# Patient Record
Sex: FEMALE | Race: WHITE | NOT HISPANIC OR LATINO | Employment: OTHER | URBAN - METROPOLITAN AREA
[De-identification: names, ages, dates, MRNs, and addresses within clinical notes are randomized per-mention and may not be internally consistent; named-entity substitution may affect disease eponyms.]

---

## 2019-04-02 PROBLEM — I10 ESSENTIAL HYPERTENSION: Status: ACTIVE | Noted: 2019-04-02

## 2019-04-02 PROBLEM — E03.9 HYPOTHYROID: Status: ACTIVE | Noted: 2019-04-02

## 2019-04-02 PROBLEM — M25.50 MULTIPLE JOINT PAIN: Status: ACTIVE | Noted: 2019-04-02

## 2019-04-02 PROBLEM — K64.9 HEMORRHOIDS: Status: ACTIVE | Noted: 2019-04-02

## 2019-04-02 PROBLEM — R06.83 SNORING: Status: ACTIVE | Noted: 2019-04-02

## 2019-04-02 PROBLEM — R53.82 CHRONIC FATIGUE: Status: ACTIVE | Noted: 2019-04-02

## 2019-04-23 ENCOUNTER — APPOINTMENT (OUTPATIENT)
Dept: LAB | Facility: HOSPITAL | Age: 59
End: 2019-04-23

## 2021-04-13 ENCOUNTER — OFFICE VISIT (OUTPATIENT)
Dept: FAMILY MEDICINE CLINIC | Facility: CLINIC | Age: 61
End: 2021-04-13

## 2021-04-13 VITALS
TEMPERATURE: 97.8 F | HEART RATE: 75 BPM | BODY MASS INDEX: 38.92 KG/M2 | RESPIRATION RATE: 18 BRPM | WEIGHT: 256.8 LBS | HEIGHT: 68 IN | OXYGEN SATURATION: 97 % | DIASTOLIC BLOOD PRESSURE: 88 MMHG | SYSTOLIC BLOOD PRESSURE: 132 MMHG

## 2021-04-13 DIAGNOSIS — Z00.01 ENCOUNTER FOR GENERAL ADULT MEDICAL EXAMINATION WITH ABNORMAL FINDINGS: ICD-10-CM

## 2021-04-13 DIAGNOSIS — Z12.11 SCREENING FOR COLON CANCER: ICD-10-CM

## 2021-04-13 DIAGNOSIS — E03.9 ACQUIRED HYPOTHYROIDISM: Primary | ICD-10-CM

## 2021-04-13 DIAGNOSIS — I10 ESSENTIAL HYPERTENSION: ICD-10-CM

## 2021-04-13 PROBLEM — Z12.31 SCREENING MAMMOGRAM, ENCOUNTER FOR: Status: ACTIVE | Noted: 2021-04-13

## 2021-04-13 PROBLEM — Z13.220 SCREENING FOR HYPERLIPIDEMIA: Status: ACTIVE | Noted: 2021-04-13

## 2021-04-13 PROBLEM — Z12.4 SCREENING FOR CERVICAL CANCER: Status: ACTIVE | Noted: 2021-04-13

## 2021-04-13 PROCEDURE — 99386 PREV VISIT NEW AGE 40-64: CPT | Performed by: FAMILY MEDICINE

## 2021-04-13 RX ORDER — SPIRONOLACTONE 50 MG/1
100 TABLET, FILM COATED ORAL 2 TIMES DAILY
COMMUNITY
Start: 2021-01-13 | End: 2023-03-10

## 2021-04-13 RX ORDER — LEVOTHYROXINE SODIUM 0.07 MG/1
75 TABLET ORAL DAILY
COMMUNITY
End: 2021-05-28 | Stop reason: SDUPTHER

## 2021-04-13 RX ORDER — METOPROLOL SUCCINATE 50 MG/1
50 TABLET, EXTENDED RELEASE ORAL DAILY
COMMUNITY
End: 2021-05-28 | Stop reason: SDUPTHER

## 2021-04-13 RX ORDER — LEVOTHYROXINE SODIUM 0.07 MG/1
TABLET ORAL
COMMUNITY
Start: 2021-03-01 | End: 2021-04-13

## 2021-04-13 NOTE — PROGRESS NOTES
"Subjective   Tirso Palomo is a 60 y.o. female.     Chief Complaint   Patient presents with   • Hypothyroidism   • Annual Exam     Annual Wellness  Tirso is a 60 female here for a Well Woman Visit. Energy level is described as fair and SHe is sleeping fairly well. She sleeps 7 hours nightly. Last pap was 2019 at [facility] . Patient exercises irregularly. Nutrition is described as in general, a \"healthy\" diet  . Her libido is normal. She reports that she does perform monthly self breast exam.    Hypothyroidism  This is a chronic problem. The current episode started more than 1 year ago. The problem occurs constantly. The problem has been waxing and waning. Pertinent negatives include no abdominal pain, arthralgias, change in bowel habit, chest pain, chills, congestion, coughing, fatigue, fever, headaches, nausea, rash, sore throat, visual change or vomiting. Nothing aggravates the symptoms. Treatments tried: levothyroxine.        I personally reviewed and updated the patient's allergies, medications, problem list, and past medical, surgical, social, and family history. I have reviewed and confirmed the accuracy of the History of Present Illness and Review of Symptoms as documented by the MA/LPN/RN. Miryam Martinez MD    Allergies:  Allergies   Allergen Reactions   • Lisinopril Cough   • Topamax [Topiramate] GI Intolerance   • Amlodipine Palpitations       Social History:  Social History     Socioeconomic History   • Marital status:      Spouse name: Not on file   • Number of children: Not on file   • Years of education: Not on file   • Highest education level: Not on file   Tobacco Use   • Smoking status: Former Smoker   • Smokeless tobacco: Never Used   Substance and Sexual Activity   • Alcohol use: No   • Drug use: No       Family History:  Family History   Problem Relation Age of Onset   • Cancer Mother         breast   • Hypertension Father    • Heart attack Father    • Cancer Father         prostate   • " Obesity Brother    • Hypertension Brother    • Heart disease Maternal Grandmother    • Diabetes Maternal Grandmother    • Obesity Maternal Grandfather    • Stroke Maternal Grandfather    • Cancer Paternal Grandmother    • Stroke Paternal Grandfather        Past Medical History :  Patient Active Problem List   Diagnosis   • Chronic fatigue   • Essential hypertension   • Snoring   • Hemorrhoids   • Multiple joint pain   • Acquired hypothyroidism   • Screening mammogram, encounter for   • Screening for cervical cancer   • Screening for colon cancer   • Screening for hyperlipidemia   • Encounter for general adult medical examination with abnormal findings       Medication List:    Current Outpatient Medications:   •  cholecalciferol (VITAMIN D3) 1000 units tablet, Take 1,000 Units by mouth Daily., Disp: , Rfl:   •  estradiol (ESTRACE) 0.5 MG tablet, Take 0.5 mg by mouth Daily. Vaginal as needed, Disp: , Rfl:   •  levothyroxine (SYNTHROID, LEVOTHROID) 75 MCG tablet, Take 75 mcg by mouth Daily., Disp: , Rfl:   •  metoprolol succinate XL (TOPROL-XL) 50 MG 24 hr tablet, Take 50 mg by mouth Daily., Disp: , Rfl:   •  Minoxidil 5 % foam, Apply  topically., Disp: , Rfl:   •  Probiotic Product (PROBIOTIC-10 PO), Take  by mouth., Disp: , Rfl:   •  Progesterone Micronized (PROGESTERONE PO), Take 75 mg by mouth., Disp: , Rfl:   •  spironolactone (ALDACTONE) 50 MG tablet, Take 50 mg by mouth 2 (Two) Times a Day., Disp: , Rfl:   •  Tretinoin (RETIN-A EX), Apply  topically., Disp: , Rfl:   •  vitamin B-12 (CYANOCOBALAMIN) 1000 MCG tablet, Take 1,000 mcg by mouth Daily., Disp: , Rfl:     Past Surgical History:  Past Surgical History:   Procedure Laterality Date   • BREAST SURGERY Right 2012    biopsy   • COLONOSCOPY  2018   • TUBAL ABDOMINAL LIGATION  1993       Review of Systems:  Review of Systems   Constitutional: Negative for activity change, chills, fatigue and fever.   HENT: Negative for congestion, ear pain, rhinorrhea, sinus  "pressure, sore throat and voice change.    Eyes: Negative for visual disturbance.   Respiratory: Negative for cough and shortness of breath.    Cardiovascular: Negative for chest pain.   Gastrointestinal: Negative for abdominal pain, change in bowel habit, diarrhea, nausea and vomiting.   Endocrine: Negative for cold intolerance and heat intolerance.   Genitourinary: Negative for frequency and urgency.   Musculoskeletal: Negative for arthralgias.   Skin: Negative for rash.   Neurological: Negative for syncope.   Hematological: Does not bruise/bleed easily.   Psychiatric/Behavioral: Negative for depressed mood. The patient is not nervous/anxious.        Physical Exam:  Vital Signs:  Vital Signs:   /88   Pulse 75   Temp 97.8 °F (36.6 °C)   Resp 18   Ht 171.5 cm (67.5\")   Wt 116 kg (256 lb 12.8 oz)   SpO2 97%   BMI 39.63 kg/m²     Result Review :                Physical Exam  Vitals and nursing note reviewed.   Constitutional:       General: She is not in acute distress.     Appearance: She is well-developed. She is not diaphoretic.   HENT:      Head: Normocephalic and atraumatic.      Right Ear: External ear normal.      Left Ear: External ear normal.      Nose: Nose normal.      Mouth/Throat:      Pharynx: No oropharyngeal exudate.   Eyes:      General: No scleral icterus.        Right eye: No discharge.         Left eye: No discharge.      Conjunctiva/sclera: Conjunctivae normal.      Pupils: Pupils are equal, round, and reactive to light.   Neck:      Thyroid: No thyromegaly.      Trachea: No tracheal deviation.   Cardiovascular:      Rate and Rhythm: Normal rate and regular rhythm.      Heart sounds: Normal heart sounds. No murmur heard.   No friction rub. No gallop.    Pulmonary:      Effort: Pulmonary effort is normal. No respiratory distress.      Breath sounds: Normal breath sounds. No stridor. No wheezing or rales.   Abdominal:      General: Bowel sounds are normal. There is no distension.      " Palpations: Abdomen is soft. There is no mass.      Tenderness: There is no abdominal tenderness. There is no guarding or rebound.   Musculoskeletal:         General: No tenderness or deformity. Normal range of motion.      Cervical back: Normal range of motion and neck supple.   Lymphadenopathy:      Cervical: No cervical adenopathy.   Skin:     General: Skin is warm and dry.      Capillary Refill: Capillary refill takes less than 2 seconds.      Coloration: Skin is not pale.      Findings: No erythema or rash.   Neurological:      Mental Status: She is alert and oriented to person, place, and time.      Cranial Nerves: No cranial nerve deficit.      Sensory: No sensory deficit.      Motor: No tremor, atrophy or abnormal muscle tone.      Coordination: Coordination normal.      Gait: Gait normal.      Deep Tendon Reflexes: Reflexes are normal and symmetric. Reflexes normal.   Psychiatric:         Behavior: Behavior normal.         Thought Content: Thought content normal.         Cognition and Memory: Memory is not impaired. She does not exhibit impaired recent memory or impaired remote memory.         Judgment: Judgment normal.         Assessment and Plan:  Problems Addressed this Visit        Cardiac and Vasculature    Essential hypertension    Relevant Medications    metoprolol succinate XL (TOPROL-XL) 50 MG 24 hr tablet    spironolactone (ALDACTONE) 50 MG tablet    Other Relevant Orders    CBC & Differential    Comprehensive Metabolic Panel    TSH       Endocrine and Metabolic    Acquired hypothyroidism - Primary    Relevant Medications    levothyroxine (SYNTHROID, LEVOTHROID) 75 MCG tablet    metoprolol succinate XL (TOPROL-XL) 50 MG 24 hr tablet       Gastrointestinal Abdominal     Screening for colon cancer    Relevant Orders    Lipid Panel With / Chol / HDL Ratio       Health Encounters    Encounter for general adult medical examination with abnormal findings      Diagnoses       Codes Comments    Acquired  hypothyroidism    -  Primary ICD-10-CM: E03.9  ICD-9-CM: 244.9     Encounter for general adult medical examination with abnormal findings     ICD-10-CM: Z00.01  ICD-9-CM: V70.0     Essential hypertension     ICD-10-CM: I10  ICD-9-CM: 401.9     Screening for colon cancer     ICD-10-CM: Z12.11  ICD-9-CM: V76.51            An After Visit Summary and PPPS were given to the patient.         I wore protective equipment throughout this patient encounter to include mask. Hand hygiene was performed before donning protective equipment and after removal when leaving the room.

## 2021-04-23 ENCOUNTER — LAB (OUTPATIENT)
Dept: LAB | Facility: HOSPITAL | Age: 61
End: 2021-04-23

## 2021-04-23 ENCOUNTER — HOSPITAL ENCOUNTER (OUTPATIENT)
Dept: CARDIOLOGY | Facility: HOSPITAL | Age: 61
Discharge: HOME | End: 2021-04-23

## 2021-04-23 ENCOUNTER — TRANSCRIBE ORDERS (OUTPATIENT)
Dept: ADMINISTRATIVE | Facility: HOSPITAL | Age: 61
End: 2021-04-23

## 2021-04-23 ENCOUNTER — TELEPHONE (OUTPATIENT)
Dept: FAMILY MEDICINE CLINIC | Facility: CLINIC | Age: 61
End: 2021-04-23

## 2021-04-23 DIAGNOSIS — Z01.818 PREOP TESTING: Primary | ICD-10-CM

## 2021-04-23 DIAGNOSIS — Z01.818 PREOP TESTING: ICD-10-CM

## 2021-04-23 LAB
ALBUMIN SERPL-MCNC: 4.5 G/DL (ref 3.5–5.2)
ALBUMIN/GLOB SERPL: 1.6 G/DL
ALP SERPL-CCNC: 75 U/L (ref 39–117)
ALT SERPL W P-5'-P-CCNC: 13 U/L (ref 1–33)
ANION GAP SERPL CALCULATED.3IONS-SCNC: 8.3 MMOL/L (ref 5–15)
AST SERPL-CCNC: 13 U/L (ref 1–32)
BASOPHILS # BLD AUTO: 0.06 10*3/MM3 (ref 0–0.2)
BASOPHILS NFR BLD AUTO: 0.5 % (ref 0–1.5)
BILIRUB SERPL-MCNC: 0.2 MG/DL (ref 0–1.2)
BUN SERPL-MCNC: 11 MG/DL (ref 8–23)
BUN/CREAT SERPL: 11.5 (ref 7–25)
CALCIUM SPEC-SCNC: 9.9 MG/DL (ref 8.6–10.5)
CHLORIDE SERPL-SCNC: 102 MMOL/L (ref 98–107)
CHOLEST SERPL-MCNC: 189 MG/DL (ref 0–200)
CO2 SERPL-SCNC: 27.7 MMOL/L (ref 22–29)
CREAT SERPL-MCNC: 0.96 MG/DL (ref 0.57–1)
DEPRECATED RDW RBC AUTO: 42.5 FL (ref 37–54)
EOSINOPHIL # BLD AUTO: 0.17 10*3/MM3 (ref 0–0.4)
EOSINOPHIL NFR BLD AUTO: 1.5 % (ref 0.3–6.2)
ERYTHROCYTE [DISTWIDTH] IN BLOOD BY AUTOMATED COUNT: 13.4 % (ref 12.3–15.4)
GFR SERPL CREATININE-BSD FRML MDRD: 59 ML/MIN/1.73
GLOBULIN UR ELPH-MCNC: 2.8 GM/DL
GLUCOSE SERPL-MCNC: 98 MG/DL (ref 65–99)
HCT VFR BLD AUTO: 44 % (ref 34–46.6)
HDLC SERPL QL: 3.86
HDLC SERPL-MCNC: 49 MG/DL (ref 40–60)
HGB BLD-MCNC: 14.8 G/DL (ref 12–15.9)
IMM GRANULOCYTES # BLD AUTO: 0.08 10*3/MM3 (ref 0–0.05)
IMM GRANULOCYTES NFR BLD AUTO: 0.7 % (ref 0–0.5)
LDLC SERPL CALC-MCNC: 113 MG/DL (ref 0–100)
LYMPHOCYTES # BLD AUTO: 2.96 10*3/MM3 (ref 0.7–3.1)
LYMPHOCYTES NFR BLD AUTO: 25.7 % (ref 19.6–45.3)
MCH RBC QN AUTO: 29.3 PG (ref 26.6–33)
MCHC RBC AUTO-ENTMCNC: 33.6 G/DL (ref 31.5–35.7)
MCV RBC AUTO: 87.1 FL (ref 79–97)
MONOCYTES # BLD AUTO: 0.78 10*3/MM3 (ref 0.1–0.9)
MONOCYTES NFR BLD AUTO: 6.8 % (ref 5–12)
NEUTROPHILS NFR BLD AUTO: 64.8 % (ref 42.7–76)
NEUTROPHILS NFR BLD AUTO: 7.46 10*3/MM3 (ref 1.7–7)
NRBC BLD AUTO-RTO: 0 /100 WBC (ref 0–0.2)
PLATELET # BLD AUTO: 359 10*3/MM3 (ref 140–450)
PMV BLD AUTO: 11.4 FL (ref 6–12)
POTASSIUM SERPL-SCNC: 4.8 MMOL/L (ref 3.5–5.2)
PROT SERPL-MCNC: 7.3 G/DL (ref 6–8.5)
QT INTERVAL: 408 MS
RBC # BLD AUTO: 5.05 10*6/MM3 (ref 3.77–5.28)
SODIUM SERPL-SCNC: 138 MMOL/L (ref 136–145)
TRIGL SERPL-MCNC: 152 MG/DL (ref 0–150)
TSH SERPL DL<=0.05 MIU/L-ACNC: 3.29 UIU/ML (ref 0.27–4.2)
VLDLC SERPL-MCNC: 27 MG/DL (ref 5–40)
WBC # BLD AUTO: 11.51 10*3/MM3 (ref 3.4–10.8)

## 2021-04-23 PROCEDURE — 84443 ASSAY THYROID STIM HORMONE: CPT | Performed by: FAMILY MEDICINE

## 2021-04-23 PROCEDURE — 80061 LIPID PANEL: CPT | Performed by: FAMILY MEDICINE

## 2021-04-23 PROCEDURE — 36415 COLL VENOUS BLD VENIPUNCTURE: CPT | Performed by: FAMILY MEDICINE

## 2021-04-23 PROCEDURE — 85025 COMPLETE CBC W/AUTO DIFF WBC: CPT | Performed by: FAMILY MEDICINE

## 2021-04-23 PROCEDURE — 93005 ELECTROCARDIOGRAM TRACING: CPT | Performed by: OTOLARYNGOLOGY

## 2021-04-23 PROCEDURE — 93010 ELECTROCARDIOGRAM REPORT: CPT | Performed by: INTERNAL MEDICINE

## 2021-04-23 PROCEDURE — 80053 COMPREHEN METABOLIC PANEL: CPT | Performed by: FAMILY MEDICINE

## 2021-04-23 NOTE — TELEPHONE ENCOUNTER
Caller: Tirso Palomo    Relationship to patient: Self    Best call back number: [phone]    Patient is needing: PATIENT CALLED AND STATED THAT SHE TOOK LABS 04/23/21 AND NEEDS HER LABS TO BE SENT TO DR. MCKEON OFFICE.

## 2021-04-26 ENCOUNTER — TELEPHONE (OUTPATIENT)
Dept: FAMILY MEDICINE CLINIC | Facility: CLINIC | Age: 61
End: 2021-04-26

## 2021-04-26 DIAGNOSIS — I10 ESSENTIAL HYPERTENSION: Primary | ICD-10-CM

## 2021-04-26 DIAGNOSIS — Z13.220 SCREENING FOR HYPERLIPIDEMIA: ICD-10-CM

## 2021-04-26 NOTE — TELEPHONE ENCOUNTER
----- Message from Miryam Martinez MD sent at 4/26/2021  8:49 AM EDT -----  Her labs are ok except for her cholesterol and WBC are elevated. Start co q 10 100mg and omega 3 1000mg. Eat more tuna, salmon and walnuts. Recheck 3 months    Ask her about symptoms of infection for the elevated WBC. I want to recheck it also in 3 months if no symptoms.

## 2021-05-28 ENCOUNTER — TELEPHONE (OUTPATIENT)
Dept: FAMILY MEDICINE CLINIC | Facility: CLINIC | Age: 61
End: 2021-05-28

## 2021-06-01 RX ORDER — LEVOTHYROXINE SODIUM 0.07 MG/1
75 TABLET ORAL DAILY
Qty: 90 TABLET | Refills: 3 | Status: SHIPPED | OUTPATIENT
Start: 2021-06-01 | End: 2022-05-16 | Stop reason: SDUPTHER

## 2021-06-01 RX ORDER — METOPROLOL SUCCINATE 50 MG/1
50 TABLET, EXTENDED RELEASE ORAL DAILY
Qty: 90 TABLET | Refills: 3 | Status: SHIPPED | OUTPATIENT
Start: 2021-06-01 | End: 2022-04-25

## 2021-08-12 LAB
ALBUMIN SERPL-MCNC: 4.5 G/DL (ref 3.8–4.8)
ALBUMIN/GLOB SERPL: 1.6 {RATIO} (ref 1.2–2.2)
ALP SERPL-CCNC: 80 IU/L (ref 48–121)
ALT SERPL-CCNC: 20 IU/L (ref 0–32)
AST SERPL-CCNC: 20 IU/L (ref 0–40)
BASOPHILS # BLD AUTO: 0.1 X10E3/UL (ref 0–0.2)
BASOPHILS NFR BLD AUTO: 1 %
BILIRUB SERPL-MCNC: 0.3 MG/DL (ref 0–1.2)
BUN SERPL-MCNC: 12 MG/DL (ref 8–27)
BUN/CREAT SERPL: 13 (ref 12–28)
CALCIUM SERPL-MCNC: 10.2 MG/DL (ref 8.7–10.3)
CHLORIDE SERPL-SCNC: 99 MMOL/L (ref 96–106)
CHOLEST SERPL-MCNC: 197 MG/DL (ref 100–199)
CHOLEST/HDLC SERPL: 3.9 RATIO (ref 0–4.4)
CO2 SERPL-SCNC: 24 MMOL/L (ref 20–29)
CREAT SERPL-MCNC: 0.96 MG/DL (ref 0.57–1)
EOSINOPHIL # BLD AUTO: 0.1 X10E3/UL (ref 0–0.4)
EOSINOPHIL NFR BLD AUTO: 1 %
ERYTHROCYTE [DISTWIDTH] IN BLOOD BY AUTOMATED COUNT: 13.6 % (ref 11.7–15.4)
GLOBULIN SER CALC-MCNC: 2.8 G/DL (ref 1.5–4.5)
GLUCOSE SERPL-MCNC: 98 MG/DL (ref 65–99)
HCT VFR BLD AUTO: 41.4 % (ref 34–46.6)
HDLC SERPL-MCNC: 51 MG/DL
HGB BLD-MCNC: 13.6 G/DL (ref 11.1–15.9)
IMM GRANULOCYTES # BLD AUTO: 0.1 X10E3/UL (ref 0–0.1)
IMM GRANULOCYTES NFR BLD AUTO: 1 %
LDLC SERPL CALC-MCNC: 112 MG/DL (ref 0–99)
LYMPHOCYTES # BLD AUTO: 2.8 X10E3/UL (ref 0.7–3.1)
LYMPHOCYTES NFR BLD AUTO: 27 %
MCH RBC QN AUTO: 28.9 PG (ref 26.6–33)
MCHC RBC AUTO-ENTMCNC: 32.9 G/DL (ref 31.5–35.7)
MCV RBC AUTO: 88 FL (ref 79–97)
MONOCYTES # BLD AUTO: 0.7 X10E3/UL (ref 0.1–0.9)
MONOCYTES NFR BLD AUTO: 6 %
NEUTROPHILS # BLD AUTO: 6.7 X10E3/UL (ref 1.4–7)
NEUTROPHILS NFR BLD AUTO: 64 %
PLATELET # BLD AUTO: 396 X10E3/UL (ref 150–450)
POTASSIUM SERPL-SCNC: 5.1 MMOL/L (ref 3.5–5.2)
PROT SERPL-MCNC: 7.3 G/DL (ref 6–8.5)
RBC # BLD AUTO: 4.7 X10E6/UL (ref 3.77–5.28)
SODIUM SERPL-SCNC: 137 MMOL/L (ref 134–144)
TRIGL SERPL-MCNC: 198 MG/DL (ref 0–149)
VLDLC SERPL CALC-MCNC: 34 MG/DL (ref 5–40)
WBC # BLD AUTO: 10.4 X10E3/UL (ref 3.4–10.8)

## 2021-08-13 ENCOUNTER — TELEPHONE (OUTPATIENT)
Dept: FAMILY MEDICINE CLINIC | Facility: CLINIC | Age: 61
End: 2021-08-13

## 2021-08-13 NOTE — TELEPHONE ENCOUNTER
----- Message from Miryam Martinez MD sent at 8/12/2021  5:21 PM EDT -----  TG went up to 198. Watch the carbs and fats

## 2021-10-25 ENCOUNTER — TELEPHONE (OUTPATIENT)
Dept: FAMILY MEDICINE CLINIC | Facility: CLINIC | Age: 61
End: 2021-10-25

## 2021-10-25 NOTE — TELEPHONE ENCOUNTER
Caller: Tirso Palomo    Relationship: Self    Best call back number: [phone]    What is the best time to reach you: ANY TIME    Who are you requesting to speak with (clinical staff, provider,  specific staff member): CLINICAL STAFF    What was the call regarding: PATIENT CALLED STATING SHE WANTS TO START A MEDICATION CALLED [employer] TO HELP WITH WEIGHT MANAGEMENT, BUT SHE WANTS TO KNOW IF THIS MEDICATION WILL HAVE ANY INTERACTIONS WITH levothyroxine (SYNTHROID, LEVOTHROID) 75 MCG tablet.     PLEASE ADVISE PATIENT, IT IS OKAY TO SEND MESSAGE ON [facility].    Do you require a callback: YES

## 2021-10-25 NOTE — TELEPHONE ENCOUNTER
Sure however, she needs to call her insurance first to see if they cover it. It was going to cost me $1200 a month

## 2022-04-25 RX ORDER — METOPROLOL SUCCINATE 50 MG/1
TABLET, EXTENDED RELEASE ORAL
Qty: 30 TABLET | Refills: 0 | Status: SHIPPED | OUTPATIENT
Start: 2022-04-25 | End: 2022-06-02 | Stop reason: SDUPTHER

## 2022-04-28 ENCOUNTER — TELEPHONE (OUTPATIENT)
Dept: FAMILY MEDICINE CLINIC | Facility: CLINIC | Age: 62
End: 2022-04-28

## 2022-04-28 NOTE — TELEPHONE ENCOUNTER
Caller: Tirso Palomo    Relationship to patient: Self    Best call back number: [phone]     Patient is needing: PATIENT IS CALLING TO STATE SHE WILL BE FASTING FOR LABS THE DAY OF HER PHYSICAL.      PLEASE ADVISE.

## 2022-05-05 ENCOUNTER — TELEPHONE (OUTPATIENT)
Dept: FAMILY MEDICINE CLINIC | Facility: CLINIC | Age: 62
End: 2022-05-05

## 2022-05-05 NOTE — TELEPHONE ENCOUNTER
PATIENT IS CALLING IN SHE JUST TESTED POSITIVE FOR COVID AND SHE IS ASKING ABOUT GETTING THE INFUSION.      PLEASE ADVISE    CALLBACK NUMBER IS  [phone]

## 2022-05-16 RX ORDER — LEVOTHYROXINE SODIUM 0.07 MG/1
TABLET ORAL
Qty: 30 TABLET | Refills: 0 | Status: SHIPPED | OUTPATIENT
Start: 2022-05-16 | End: 2022-06-02 | Stop reason: SDUPTHER

## 2022-05-20 RX ORDER — METOPROLOL SUCCINATE 50 MG/1
TABLET, EXTENDED RELEASE ORAL
Qty: 30 TABLET | Refills: 0 | OUTPATIENT
Start: 2022-05-20

## 2022-06-02 ENCOUNTER — OFFICE VISIT (OUTPATIENT)
Dept: FAMILY MEDICINE CLINIC | Facility: CLINIC | Age: 62
End: 2022-06-02

## 2022-06-02 VITALS
SYSTOLIC BLOOD PRESSURE: 142 MMHG | TEMPERATURE: 97.3 F | WEIGHT: 225 LBS | RESPIRATION RATE: 18 BRPM | OXYGEN SATURATION: 98 % | DIASTOLIC BLOOD PRESSURE: 88 MMHG | HEIGHT: 67 IN | HEART RATE: 77 BPM | BODY MASS INDEX: 35.31 KG/M2

## 2022-06-02 DIAGNOSIS — Z79.890 HORMONE REPLACEMENT THERAPY: ICD-10-CM

## 2022-06-02 DIAGNOSIS — I10 ESSENTIAL HYPERTENSION: ICD-10-CM

## 2022-06-02 DIAGNOSIS — E66.01 CLASS 2 SEVERE OBESITY DUE TO EXCESS CALORIES WITH SERIOUS COMORBIDITY AND BODY MASS INDEX (BMI) OF 35.0 TO 35.9 IN ADULT: Primary | ICD-10-CM

## 2022-06-02 DIAGNOSIS — Z13.220 SCREENING FOR HYPERLIPIDEMIA: ICD-10-CM

## 2022-06-02 DIAGNOSIS — E03.9 ACQUIRED HYPOTHYROIDISM: ICD-10-CM

## 2022-06-02 DIAGNOSIS — Z00.01 ENCOUNTER FOR GENERAL ADULT MEDICAL EXAMINATION WITH ABNORMAL FINDINGS: ICD-10-CM

## 2022-06-02 DIAGNOSIS — Z86.010 HISTORY OF COLONIC POLYPS: ICD-10-CM

## 2022-06-02 PROBLEM — N36.41 HYPERMOBILITY OF URETHRA: Status: ACTIVE | Noted: 2022-06-02

## 2022-06-02 PROBLEM — N81.10 FEMALE BLADDER PROLAPSE: Status: ACTIVE | Noted: 2022-06-02

## 2022-06-02 PROBLEM — Z86.0100 HISTORY OF COLONIC POLYPS: Status: ACTIVE | Noted: 2022-06-02

## 2022-06-02 PROBLEM — Z78.0 MENOPAUSE PRESENT: Status: ACTIVE | Noted: 2022-06-02

## 2022-06-02 PROBLEM — M47.817 SPONDYLOSIS OF LUMBOSACRAL SPINE WITHOUT MYELOPATHY: Status: ACTIVE | Noted: 2017-04-11

## 2022-06-02 PROBLEM — N39.3 STRESS INCONTINENCE OF URINE: Status: ACTIVE | Noted: 2022-06-02

## 2022-06-02 PROBLEM — R32 URINARY INCONTINENCE: Status: ACTIVE | Noted: 2022-06-02

## 2022-06-02 LAB
BILIRUB BLD-MCNC: NEGATIVE MG/DL
CLARITY, POC: ABNORMAL
GLUCOSE UR STRIP-MCNC: NEGATIVE MG/DL
KETONES UR QL: ABNORMAL
LEUKOCYTE EST, POC: NEGATIVE
NITRITE UR-MCNC: NEGATIVE MG/ML
PH UR: 5 [PH] (ref 5–8)
PROT UR STRIP-MCNC: NEGATIVE MG/DL
RBC # UR STRIP: NEGATIVE /UL
SP GR UR: 1 (ref 1–1.03)
UROBILINOGEN UR QL: NORMAL

## 2022-06-02 PROCEDURE — 99214 OFFICE O/P EST MOD 30 MIN: CPT | Performed by: FAMILY MEDICINE

## 2022-06-02 PROCEDURE — 99396 PREV VISIT EST AGE 40-64: CPT | Performed by: FAMILY MEDICINE

## 2022-06-02 PROCEDURE — 81002 URINALYSIS NONAUTO W/O SCOPE: CPT | Performed by: FAMILY MEDICINE

## 2022-06-02 RX ORDER — METOPROLOL SUCCINATE 50 MG/1
50 TABLET, EXTENDED RELEASE ORAL DAILY
Qty: 30 TABLET | Refills: 0 | Status: SHIPPED | OUTPATIENT
Start: 2022-06-02 | End: 2022-06-21

## 2022-06-02 RX ORDER — SEMAGLUTIDE 1.34 MG/ML
INJECTION, SOLUTION SUBCUTANEOUS
COMMUNITY
Start: 2022-05-09 | End: 2022-11-07

## 2022-06-02 RX ORDER — LEVOTHYROXINE SODIUM 0.07 MG/1
75 TABLET ORAL DAILY
Qty: 30 TABLET | Refills: 0 | Status: SHIPPED | OUTPATIENT
Start: 2022-06-02 | End: 2022-06-14

## 2022-06-02 NOTE — PROGRESS NOTES
"  Chief Complaint   Patient presents with   • Annual Exam   • Hypertension   • Hypothyroidism         Subjective   Tirso Palomo is a 61 y.o. female here for a Annual Visit.     Last Physical Exam: 04/13/2021 Previous physical was performed by  Miryam Martinez MD  General Health:fair  Contraceptive Historypostmenoposal  Nutrition:in general, a \"healthy\" diet    Exercise Level:regularly 3 times weekly  Sleep:fairly well  Hours of Sleep:7  Libido:good  Self Skin Exam: monthly  Monthly Breast Self Exam:Performs monthly self breast exam    Pap Smear:  Last Completed Pap Smear          PAP SMEAR (Every 3 Years) Next due on 8/20/2023 08/20/2020  Patient-Reported (Performed Externally)    08/20/2020  SCANNED - PAP SMEAR             Findings on last pap: approximate date 08/20/2020 and was normal} Sees Dr Chung    Mammogram:   Last Completed Mammogram     This patient has no relevant Health Maintenance data.       was done on approximately 05/2021 and the result was: Birads I (Normal).    Bone Dexa scan: On 03/19/2019 and results were Normal    Colonoscopy:   Last Completed Colonoscopy          COLORECTAL CANCER SCREENING (COLONOSCOPY - Every 10 Years) Next due on 12/6/2030 12/06/2020  COLONOSCOPY (Done)             Last colonoscopy was 2019 Dr. Zapata  with repeat results repeat in 5 years    Hypertension  This is a chronic problem. The current episode started more than 1 year ago. The problem is uncontrolled. Pertinent negatives include no chest pain, headaches, malaise/fatigue, palpitations or shortness of breath. Risk factors for coronary artery disease include dyslipidemia, post-menopausal state and family history. Current antihypertension treatment includes beta blockers. The current treatment provides moderate improvement.   Hypothyroidism  This is a chronic problem. The current episode started more than 1 year ago. Pertinent negatives include no abdominal pain, arthralgias, chest pain, coughing, fever, " headaches, nausea, rash or vomiting. Treatments tried: Levothyroxine 75 MCG. The treatment provided moderate relief.        I personally reviewed and updated the patient's allergies, medications, problem list, and past medical, surgical, social, and family history.     Allergies:  Allergies   Allergen Reactions   • Lisinopril Cough   • Topamax [Topiramate] GI Intolerance   • Amlodipine Palpitations       Social History:  Social History     Socioeconomic History   • Marital status:    Tobacco Use   • Smoking status: Former Smoker   • Smokeless tobacco: Never Used   Substance and Sexual Activity   • Alcohol use: No   • Drug use: No       Family History:  Family History   Problem Relation Age of Onset   • Cancer Mother         breast   • Hypertension Father    • Heart attack Father    • Cancer Father         prostate   • Obesity Brother    • Hypertension Brother    • Breast cancer Maternal Aunt    • Heart disease Maternal Grandmother    • Diabetes Maternal Grandmother    • Obesity Maternal Grandfather    • Stroke Maternal Grandfather    • Cancer Paternal Grandmother    • Stroke Paternal Grandfather        Past Medical History :  Active Ambulatory Problems     Diagnosis Date Noted   • Chronic fatigue 04/02/2019   • Essential hypertension 04/02/2019   • Snoring 04/02/2019   • Hemorrhoids 04/02/2019   • Multiple joint pain 04/02/2019   • Acquired hypothyroidism 04/02/2019   • Screening mammogram, encounter for 04/13/2021   • Screening for cervical cancer 04/13/2021   • Screening for colon cancer 04/13/2021   • Screening for hyperlipidemia 04/13/2021   • Encounter for general adult medical examination with abnormal findings 04/13/2021   • Vitamin D deficiency 10/07/2013   • Urinary incontinence 06/02/2022   • Stress incontinence of urine 06/02/2022   • Spondylosis of lumbosacral spine without myelopathy 04/11/2017   • Class 2 severe obesity due to excess calories with serious comorbidity and body mass index (BMI) of  35.0 to 35.9 in adult ([facility]) 08/13/2015   • Menopause present 06/02/2022   • Hypermobility of urethra 06/02/2022   • History of colonic polyps 06/02/2022   • Female bladder prolapse 06/02/2022   • Hormone replacement therapy 06/02/2022     Resolved Ambulatory Problems     Diagnosis Date Noted   • No Resolved Ambulatory Problems     Past Medical History:   Diagnosis Date   • Arthritis    • Hypothyroidism        Medication List:    Current Outpatient Medications:   •  cholecalciferol (VITAMIN D3) 1000 units tablet, Take 1,000 Units by mouth Daily., Disp: , Rfl:   •  estradiol (ESTRACE) 0.5 MG tablet, Take 0.5 mg by mouth Daily. Vaginal as needed, Disp: , Rfl:   •  levothyroxine (SYNTHROID, LEVOTHROID) 75 MCG tablet, Take 1 tablet by mouth Daily., Disp: 30 tablet, Rfl: 0  •  metoprolol succinate XL (TOPROL-XL) 50 MG 24 hr tablet, Take 1 tablet by mouth Daily., Disp: 30 tablet, Rfl: 0  •  Minoxidil 5 % foam, Apply  topically., Disp: , Rfl:   •  Ozempic, 0.25 or 0.5 MG/DOSE, 2 MG/1.5ML solution pen-injector, , Disp: , Rfl:   •  Probiotic Product (PROBIOTIC-10 PO), Take  by mouth., Disp: , Rfl:   •  Progesterone Micronized (PROGESTERONE PO), Take 75 mg by mouth., Disp: , Rfl:   •  spironolactone (ALDACTONE) 50 MG tablet, Take 100 mg by mouth 2 (Two) Times a Day., Disp: , Rfl:   •  vitamin B-12 (CYANOCOBALAMIN) 1000 MCG tablet, Take 1,000 mcg by mouth Daily., Disp: , Rfl:     Past Surgical History:  Past Surgical History:   Procedure Laterality Date   • BREAST SURGERY Right 2012    biopsy   • COLONOSCOPY  2018   • TOENAIL EXCISION      01/2022   • TUBAL ABDOMINAL LIGATION  1993       Depression Screen:   PHQ-2/PHQ-9 Depression Screening 6/2/2022   Retired PHQ-9 Total Score -   Retired Total Score -   Little Interest or Pleasure in Doing Things 0-->not at all   Feeling Down, Depressed or Hopeless 0-->not at all   PHQ-9: Brief Depression Severity Measure Score 0       Fall Risk Screen:  TEE Fall Risk Assessment has not  "been completed.    Review Of Systems:  Review of Systems   Constitutional: Negative for activity change, fever and malaise/fatigue.   HENT: Negative for ear pain, rhinorrhea, sinus pressure and voice change.    Eyes: Negative for visual disturbance.   Respiratory: Negative for cough and shortness of breath.    Cardiovascular: Negative for chest pain and palpitations.   Gastrointestinal: Negative for abdominal pain, diarrhea, nausea and vomiting.   Endocrine: Negative for cold intolerance and heat intolerance.   Genitourinary: Negative for frequency and urgency.   Musculoskeletal: Negative for arthralgias.   Skin: Negative for rash.   Neurological: Negative for syncope.   Hematological: Does not bruise/bleed easily.   Psychiatric/Behavioral: Negative for depressed mood. The patient is not nervous/anxious.        Physical Exam:  Vital Signs:  Visit Vitals  /88 (BP Location: Right arm, Patient Position: Sitting, Cuff Size: Adult)   Pulse 77   Temp 97.3 °F (36.3 °C) (Temporal)   Resp 18   Ht 170.2 cm (67\")   Wt 102 kg (225 lb)   SpO2 98% Comment: room air   BMI 35.24 kg/m²       Body mass index is 35.24 kg/m².      Result Review :            Physical Exam  Vitals and nursing note reviewed.   Constitutional:       General: She is not in acute distress.     Appearance: She is well-developed. She is not diaphoretic.   HENT:      Head: Normocephalic and atraumatic.      Right Ear: Tympanic membrane and external ear normal.      Left Ear: Tympanic membrane and external ear normal.      Nose: Nose normal.      Mouth/Throat:      Pharynx: No oropharyngeal exudate.   Eyes:      General: No scleral icterus.        Right eye: No discharge.         Left eye: No discharge.      Conjunctiva/sclera: Conjunctivae normal.      Pupils: Pupils are equal, round, and reactive to light.   Neck:      Thyroid: No thyromegaly.      Trachea: No tracheal deviation.   Cardiovascular:      Rate and Rhythm: Normal rate and regular rhythm.     "  Heart sounds: Normal heart sounds. No murmur heard.    No friction rub. No gallop.   Pulmonary:      Effort: Pulmonary effort is normal. No respiratory distress.      Breath sounds: Normal breath sounds. No stridor. No wheezing or rales.   Abdominal:      General: Bowel sounds are normal. There is no distension.      Palpations: Abdomen is soft. There is no mass.      Tenderness: There is no abdominal tenderness. There is no guarding or rebound.   Musculoskeletal:         General: No tenderness or deformity. Normal range of motion.      Cervical back: Normal range of motion and neck supple.   Lymphadenopathy:      Cervical: No cervical adenopathy.   Skin:     General: Skin is warm and dry.      Capillary Refill: Capillary refill takes less than 2 seconds.      Coloration: Skin is not pale.      Findings: No erythema or rash.   Neurological:      Mental Status: She is alert and oriented to person, place, and time.      Cranial Nerves: No cranial nerve deficit.      Sensory: No sensory deficit.      Motor: No tremor, atrophy or abnormal muscle tone.      Coordination: Coordination normal.      Gait: Gait normal.      Deep Tendon Reflexes: Reflexes are normal and symmetric. Reflexes normal.   Psychiatric:         Behavior: Behavior normal.         Thought Content: Thought content normal.         Cognition and Memory: Memory is not impaired. She does not exhibit impaired recent memory or impaired remote memory.         Judgment: Judgment normal.           Assessment and Plan:  Problem List Items Addressed This Visit        Cardiac and Vasculature    Essential hypertension    Current Assessment & Plan     Hypertension is unchanged.  Continue current treatment regimen.  Dietary sodium restriction.  Weight loss.  Blood pressure will be reassessed in 3 months.           Relevant Medications    metoprolol succinate XL (TOPROL-XL) 50 MG 24 hr tablet    Other Relevant Orders    CBC & Differential (Completed)    Comprehensive  Metabolic Panel (Completed)    Screening for hyperlipidemia    Relevant Orders    Lipid Panel With / Chol / HDL Ratio (Completed)       Endocrine and Metabolic    Acquired hypothyroidism    Relevant Medications    metoprolol succinate XL (TOPROL-XL) 50 MG 24 hr tablet    levothyroxine (SYNTHROID, LEVOTHROID) 75 MCG tablet    Other Relevant Orders    TSH (Completed)    Class 2 severe obesity due to excess calories with serious comorbidity and body mass index (BMI) of 35.0 to 35.9 in adult ([facility]) - Primary    Relevant Medications    Ozempic, 0.25 or 0.5 MG/DOSE, 2 MG/1.5ML solution pen-injector    Hormone replacement therapy    Current Assessment & Plan     She is monitored by her gyn              Gastrointestinal Abdominal     History of colonic polyps       Health Encounters    Encounter for general adult medical examination with abnormal findings    Relevant Orders    POCT urinalysis dipstick, manual (Completed)          Class 2 Severe Obesity (BMI >=35 and <=39.9). Obesity-related health conditions include the following: hypertension. Obesity is improving with treatment. BMI is is above average; BMI management plan is completed. We discussed low calorie, low carb based diet program, portion control and increasing exercise.         An After Visit Summary and PPPS were given to the patient.       Discussed injury prevention, diet and exercise, safe sexual practices, and screening for common diseases. Encouraged use of sunscreen and seatbelts. Discussed timing of  cervical cancer screening. Encouraged monthly self-breast exams, yearly clinical breast exams, and discussed timing of mammograms. Avoidance of tobacco encouraged. Limitation or avoidance of alcohol encouraged. Recommend yearly dental and eye exams. Also discussed monitoring of blood pressure, lipids.     I wore protective equipment throughout this patient encounter to include mask. Hand hygiene was performed before donning protective equipment and after  removal when leaving the room.

## 2022-06-03 LAB
ALBUMIN SERPL-MCNC: 4.4 G/DL (ref 3.8–4.8)
ALBUMIN/GLOB SERPL: 1.8 {RATIO} (ref 1.2–2.2)
ALP SERPL-CCNC: 77 IU/L (ref 44–121)
ALT SERPL-CCNC: 20 IU/L (ref 0–32)
AST SERPL-CCNC: 18 IU/L (ref 0–40)
BASOPHILS # BLD AUTO: 0.1 X10E3/UL (ref 0–0.2)
BASOPHILS NFR BLD AUTO: 0 %
BILIRUB SERPL-MCNC: 0.3 MG/DL (ref 0–1.2)
BUN SERPL-MCNC: 12 MG/DL (ref 8–27)
BUN/CREAT SERPL: 12 (ref 12–28)
CALCIUM SERPL-MCNC: 10.4 MG/DL (ref 8.7–10.3)
CHLORIDE SERPL-SCNC: 104 MMOL/L (ref 96–106)
CHOLEST SERPL-MCNC: 194 MG/DL (ref 100–199)
CHOLEST/HDLC SERPL: 4.2 RATIO (ref 0–4.4)
CO2 SERPL-SCNC: 24 MMOL/L (ref 20–29)
CREAT SERPL-MCNC: 0.97 MG/DL (ref 0.57–1)
EGFRCR SERPLBLD CKD-EPI 2021: 66 ML/MIN/1.73
EOSINOPHIL # BLD AUTO: 0.2 X10E3/UL (ref 0–0.4)
EOSINOPHIL NFR BLD AUTO: 1 %
ERYTHROCYTE [DISTWIDTH] IN BLOOD BY AUTOMATED COUNT: 14 % (ref 11.7–15.4)
GLOBULIN SER CALC-MCNC: 2.4 G/DL (ref 1.5–4.5)
GLUCOSE SERPL-MCNC: 89 MG/DL (ref 65–99)
HCT VFR BLD AUTO: 43.3 % (ref 34–46.6)
HDLC SERPL-MCNC: 46 MG/DL
HGB BLD-MCNC: 14 G/DL (ref 11.1–15.9)
IMM GRANULOCYTES # BLD AUTO: 0.1 X10E3/UL (ref 0–0.1)
IMM GRANULOCYTES NFR BLD AUTO: 1 %
LDLC SERPL CALC-MCNC: 124 MG/DL (ref 0–99)
LYMPHOCYTES # BLD AUTO: 3 X10E3/UL (ref 0.7–3.1)
LYMPHOCYTES NFR BLD AUTO: 27 %
MCH RBC QN AUTO: 27.7 PG (ref 26.6–33)
MCHC RBC AUTO-ENTMCNC: 32.3 G/DL (ref 31.5–35.7)
MCV RBC AUTO: 86 FL (ref 79–97)
MONOCYTES # BLD AUTO: 0.7 X10E3/UL (ref 0.1–0.9)
MONOCYTES NFR BLD AUTO: 6 %
NEUTROPHILS # BLD AUTO: 7.1 X10E3/UL (ref 1.4–7)
NEUTROPHILS NFR BLD AUTO: 65 %
PLATELET # BLD AUTO: 403 X10E3/UL (ref 150–450)
POTASSIUM SERPL-SCNC: 5.1 MMOL/L (ref 3.5–5.2)
PROT SERPL-MCNC: 6.8 G/DL (ref 6–8.5)
RBC # BLD AUTO: 5.05 X10E6/UL (ref 3.77–5.28)
SODIUM SERPL-SCNC: 139 MMOL/L (ref 134–144)
TRIGL SERPL-MCNC: 133 MG/DL (ref 0–149)
TSH SERPL DL<=0.005 MIU/L-ACNC: 2.35 UIU/ML (ref 0.45–4.5)
VLDLC SERPL CALC-MCNC: 24 MG/DL (ref 5–40)
WBC # BLD AUTO: 11.1 X10E3/UL (ref 3.4–10.8)

## 2022-06-07 ENCOUNTER — TELEPHONE (OUTPATIENT)
Dept: FAMILY MEDICINE CLINIC | Facility: CLINIC | Age: 62
End: 2022-06-07

## 2022-06-07 DIAGNOSIS — D72.9 ABNORMAL WBC COUNT: Primary | ICD-10-CM

## 2022-06-07 NOTE — TELEPHONE ENCOUNTER
Per patient: Hello thank you for reaching out. I tested positive for covid May 5th. Felt okay. No steroids..   I have noticed my WBC count is always on high side. Will be happy to do another CBC in 4 weeks. That puts it around July 5th. Let me know what I need to do if anything before I go. Will orders be in?  Thank you

## 2022-06-14 DIAGNOSIS — E03.9 ACQUIRED HYPOTHYROIDISM: ICD-10-CM

## 2022-06-14 RX ORDER — LEVOTHYROXINE SODIUM 0.07 MG/1
TABLET ORAL
Qty: 30 TABLET | Refills: 0 | Status: SHIPPED | OUTPATIENT
Start: 2022-06-14 | End: 2022-07-12

## 2022-06-21 DIAGNOSIS — I10 ESSENTIAL HYPERTENSION: ICD-10-CM

## 2022-06-21 RX ORDER — METOPROLOL SUCCINATE 50 MG/1
50 TABLET, EXTENDED RELEASE ORAL DAILY
Qty: 30 TABLET | Refills: 2 | Status: SHIPPED | OUTPATIENT
Start: 2022-06-21 | End: 2022-06-27

## 2022-06-25 DIAGNOSIS — I10 ESSENTIAL HYPERTENSION: ICD-10-CM

## 2022-06-27 ENCOUNTER — TELEPHONE (OUTPATIENT)
Dept: FAMILY MEDICINE CLINIC | Facility: CLINIC | Age: 62
End: 2022-06-27

## 2022-06-27 ENCOUNTER — PATIENT MESSAGE (OUTPATIENT)
Dept: FAMILY MEDICINE CLINIC | Facility: CLINIC | Age: 62
End: 2022-06-27

## 2022-06-27 DIAGNOSIS — I10 ESSENTIAL HYPERTENSION: Primary | ICD-10-CM

## 2022-06-27 DIAGNOSIS — D72.9 ABNORMAL WBC COUNT: ICD-10-CM

## 2022-06-27 RX ORDER — METOPROLOL SUCCINATE 50 MG/1
TABLET, EXTENDED RELEASE ORAL
Qty: 30 TABLET | Refills: 2 | Status: SHIPPED | OUTPATIENT
Start: 2022-06-27 | End: 2022-09-25

## 2022-07-05 ENCOUNTER — TELEPHONE (OUTPATIENT)
Dept: FAMILY MEDICINE CLINIC | Facility: CLINIC | Age: 62
End: 2022-07-05

## 2022-07-05 DIAGNOSIS — D72.9 ABNORMAL WBC COUNT: ICD-10-CM

## 2022-07-05 NOTE — TELEPHONE ENCOUNTER
----- Message from Annabel Trinh MA sent at 6/7/2022  5:27 PM EDT -----  Regarding: lab order  Release patient's lab orders on July 1st

## 2022-07-12 DIAGNOSIS — E03.9 ACQUIRED HYPOTHYROIDISM: ICD-10-CM

## 2022-07-12 LAB
BASOPHILS # BLD AUTO: 0 X10E3/UL (ref 0–0.2)
BASOPHILS NFR BLD AUTO: 0 %
EOSINOPHIL # BLD AUTO: 0.1 X10E3/UL (ref 0–0.4)
EOSINOPHIL NFR BLD AUTO: 1 %
ERYTHROCYTE [DISTWIDTH] IN BLOOD BY AUTOMATED COUNT: 14.5 % (ref 11.7–15.4)
HCT VFR BLD AUTO: 42.2 % (ref 34–46.6)
HGB BLD-MCNC: 13.6 G/DL (ref 11.1–15.9)
IMM GRANULOCYTES # BLD AUTO: 0 X10E3/UL (ref 0–0.1)
IMM GRANULOCYTES NFR BLD AUTO: 0 %
LYMPHOCYTES # BLD AUTO: 3 X10E3/UL (ref 0.7–3.1)
LYMPHOCYTES NFR BLD AUTO: 29 %
MCH RBC QN AUTO: 28.3 PG (ref 26.6–33)
MCHC RBC AUTO-ENTMCNC: 32.2 G/DL (ref 31.5–35.7)
MCV RBC AUTO: 88 FL (ref 79–97)
MONOCYTES # BLD AUTO: 0.6 X10E3/UL (ref 0.1–0.9)
MONOCYTES NFR BLD AUTO: 6 %
NEUTROPHILS # BLD AUTO: 6.7 X10E3/UL (ref 1.4–7)
NEUTROPHILS NFR BLD AUTO: 64 %
PATH INTERP BLD-IMP: NORMAL
PATH REV BLD -IMP: NORMAL
PATHOLOGIST NAME: NORMAL
PLATELET # BLD AUTO: 381 X10E3/UL (ref 150–450)
RBC # BLD AUTO: 4.81 X10E6/UL (ref 3.77–5.28)
WBC # BLD AUTO: 10.5 X10E3/UL (ref 3.4–10.8)

## 2022-07-12 RX ORDER — LEVOTHYROXINE SODIUM 0.07 MG/1
TABLET ORAL
Qty: 30 TABLET | Refills: 0 | Status: SHIPPED | OUTPATIENT
Start: 2022-07-12 | End: 2022-08-04 | Stop reason: SDUPTHER

## 2022-08-02 DIAGNOSIS — E03.9 ACQUIRED HYPOTHYROIDISM: ICD-10-CM

## 2022-08-04 RX ORDER — LEVOTHYROXINE SODIUM 0.07 MG/1
TABLET ORAL
Qty: 30 TABLET | Refills: 0 | Status: SHIPPED | OUTPATIENT
Start: 2022-08-04 | End: 2022-08-30

## 2022-08-30 DIAGNOSIS — E03.9 ACQUIRED HYPOTHYROIDISM: ICD-10-CM

## 2022-08-30 RX ORDER — LEVOTHYROXINE SODIUM 0.07 MG/1
TABLET ORAL
Qty: 30 TABLET | Refills: 0 | Status: SHIPPED | OUTPATIENT
Start: 2022-08-30 | End: 2022-09-25

## 2022-09-23 DIAGNOSIS — I10 ESSENTIAL HYPERTENSION: ICD-10-CM

## 2022-09-24 DIAGNOSIS — E03.9 ACQUIRED HYPOTHYROIDISM: ICD-10-CM

## 2022-09-25 RX ORDER — LEVOTHYROXINE SODIUM 0.07 MG/1
TABLET ORAL
Qty: 30 TABLET | Refills: 3 | Status: SHIPPED | OUTPATIENT
Start: 2022-09-25 | End: 2022-12-06 | Stop reason: SDUPTHER

## 2022-09-25 RX ORDER — METOPROLOL SUCCINATE 50 MG/1
TABLET, EXTENDED RELEASE ORAL
Qty: 90 TABLET | Refills: 3 | Status: SHIPPED | OUTPATIENT
Start: 2022-09-25 | End: 2022-12-06 | Stop reason: SDUPTHER

## 2022-09-28 ENCOUNTER — TELEPHONE (OUTPATIENT)
Dept: FAMILY MEDICINE CLINIC | Facility: CLINIC | Age: 62
End: 2022-09-28

## 2022-09-28 NOTE — TELEPHONE ENCOUNTER
----- Message from Tirso Palomo sent at 9/28/2022  8:05 AM EDT -----  Regarding: Monoxidil Oral B/P Medication.  Dr Martinez, I have ordered the Theradome laser. I checked into the other OTC and decided best option is laser.  Thank you.

## 2022-10-24 ENCOUNTER — OFFICE VISIT (OUTPATIENT)
Dept: FAMILY MEDICINE CLINIC | Facility: CLINIC | Age: 62
End: 2022-10-24

## 2022-10-24 VITALS
TEMPERATURE: 97.1 F | HEIGHT: 67 IN | DIASTOLIC BLOOD PRESSURE: 81 MMHG | WEIGHT: 231 LBS | RESPIRATION RATE: 16 BRPM | SYSTOLIC BLOOD PRESSURE: 170 MMHG | OXYGEN SATURATION: 98 % | BODY MASS INDEX: 36.26 KG/M2 | HEART RATE: 76 BPM

## 2022-10-24 DIAGNOSIS — I10 ESSENTIAL HYPERTENSION: ICD-10-CM

## 2022-10-24 DIAGNOSIS — N61.1 ABSCESS OF RIGHT BREAST: Primary | ICD-10-CM

## 2022-10-24 PROCEDURE — 99213 OFFICE O/P EST LOW 20 MIN: CPT

## 2022-10-24 RX ORDER — SULFAMETHOXAZOLE AND TRIMETHOPRIM 800; 160 MG/1; MG/1
1 TABLET ORAL 2 TIMES DAILY
Qty: 20 TABLET | Refills: 0 | Status: SHIPPED | OUTPATIENT
Start: 2022-10-24 | End: 2022-11-03

## 2022-10-24 RX ORDER — IBUPROFEN 600 MG/1
600 TABLET ORAL EVERY 6 HOURS PRN
Qty: 40 TABLET | Refills: 0 | Status: SHIPPED | OUTPATIENT
Start: 2022-10-24 | End: 2022-11-03

## 2022-10-24 NOTE — PROGRESS NOTES
"Subjective   Tirso Palomo is a 61 y.o. female.   Chief Complaint   Patient presents with   • Cyst     Right breast     History of Present Illness  Right breast pain - right breast 3 o clock at skin folds with reddened edematous area with drainage. Reports cyst that drained with multiple spots of drainage with greyish bloody discharge.     Reports going to dermatology summer 2022 and patient stated she reported that it was a cyst and to notify gynecology. She reportedly did so with gyn md stating it was a cyst last week.  (Breast cancer family history).  Went to [place] last week, she bent over and the lesion ruptured. Here with drainage.  Prior to rupture, lesion was approx 1/2 inch raised according to patient.  Image on chart of view today.  Culture completed and prophylactic antibiotic ordered.  Will change if culture necessitates change.    Diagnostic mammogram previously ordered through "[facility] in [place], [place] on Thursday 10/28/22.  She agrees to follow-up with primary care provider Dr. Martinez with the results of this mammogram.        Cyst  This is a new problem. The current episode started in the past 7 days. The problem occurs constantly. The problem has been rapidly worsening. Associated symptoms include chest pain and headaches. Pertinent negatives include no swollen glands. The symptoms are aggravated by bending. She has tried NSAIDs for the symptoms. The treatment provided mild relief.      Blood pressure 170/81, pulse 76, temperature 97.1 °F (36.2 °C), temperature source Infrared, resp. rate 16, height 170.2 cm (67\"), weight 105 kg (231 lb), SpO2 98 %.    Current Outpatient Medications:   •  cholecalciferol (VITAMIN D3) 1000 units tablet, Take 1,000 Units by mouth Daily., Disp: , Rfl:   •  estradiol (ESTRACE) 0.5 MG tablet, Take 0.5 mg by mouth Daily. Vaginal as needed, Disp: , Rfl:   •  levothyroxine (SYNTHROID, LEVOTHROID) 75 MCG tablet, TAKE 1 TABLET BY MOUTH EVERY DAY, Disp: 30 tablet, " Rfl: 3  •  metoprolol succinate XL (TOPROL-XL) 50 MG 24 hr tablet, TAKE 1 TABLET BY MOUTH EVERY DAY, Disp: 90 tablet, Rfl: 3  •  Minoxidil 5 % foam, Apply  topically., Disp: , Rfl:   •  Ozempic, 0.25 or 0.5 MG/DOSE, 2 MG/1.5ML solution pen-injector, , Disp: , Rfl:   •  Probiotic Product (PROBIOTIC-10 PO), Take  by mouth., Disp: , Rfl:   •  Progesterone Micronized (PROGESTERONE PO), Take 75 mg by mouth., Disp: , Rfl:   •  spironolactone (ALDACTONE) 50 MG tablet, Take 100 mg by mouth 2 (Two) Times a Day., Disp: , Rfl:   •  vitamin B-12 (CYANOCOBALAMIN) 1000 MCG tablet, Take 1,000 mcg by mouth Daily., Disp: , Rfl:   •  ibuprofen (ADVIL,MOTRIN) 600 MG tablet, Take 1 tablet by mouth Every 6 (Six) Hours As Needed for Mild Pain for up to 10 days., Disp: 40 tablet, Rfl: 0  •  sulfamethoxazole-trimethoprim (BACTRIM DS,SEPTRA DS) 800-160 MG per tablet, Take 1 tablet by mouth 2 (Two) Times a Day for 10 days., Disp: 20 tablet, Rfl: 0  The following portions of the patient's history were reviewed and updated as appropriate: allergies, current medications, past family history, past medical history, past social history, past surgical history and problem list.        Review of Systems   HENT: Negative for swollen glands.    Cardiovascular: Positive for chest pain.       Objective   Physical Exam  Vitals and nursing note reviewed. Exam conducted with a chaperone present.   Constitutional:       General: She is not in acute distress.     Appearance: Normal appearance. She is well-groomed and normal weight.   Neck:      Vascular: No carotid bruit.   Cardiovascular:      Rate and Rhythm: Normal rate and regular rhythm.      Pulses: Normal pulses.      Heart sounds: Normal heart sounds.   Pulmonary:      Effort: Pulmonary effort is normal.      Breath sounds: Normal breath sounds.   Chest:      Chest wall: Mass, swelling and tenderness present.   Breasts:     Right: Mass, skin change and tenderness present.       Lymphadenopathy:       Upper Body:      Right upper body: Axillary adenopathy present.   Skin:     General: Skin is warm and dry.      Capillary Refill: Capillary refill takes less than 2 seconds.      Findings: Abscess, erythema, lesion and wound present.   Neurological:      Mental Status: She is alert and oriented to person, place, and time.   Psychiatric:         Attention and Perception: Attention and perception normal.         Mood and Affect: Mood is anxious.         Speech: Speech normal.         Behavior: Behavior normal. Behavior is cooperative.           Assessment & Plan   Diagnoses and all orders for this visit:    1. Abscess of right breast (Primary)  Comments:  Reports going to dermatology summer 2021 and patient stated she reported that it was a cyst and to notify gynecology. image on chart.   Orders:  -     sulfamethoxazole-trimethoprim (BACTRIM DS,SEPTRA DS) 800-160 MG per tablet; Take 1 tablet by mouth 2 (Two) Times a Day for 10 days.  Dispense: 20 tablet; Refill: 0  -     Cancel: Wound Culture - Wound, Breast, Right  -     ibuprofen (ADVIL,MOTRIN) 600 MG tablet; Take 1 tablet by mouth Every 6 (Six) Hours As Needed for Mild Pain for up to 10 days.  Dispense: 40 tablet; Refill: 0  -     Anaerobic & Aerobic Culture ([facility] Only) - Swab, Breast, Right    2. Essential hypertension  Assessment & Plan:  Discussed uncontrolled hypertension.  Patient is here in an anxious state and he is concerned about a new onset breast lesion to the right.  She will follow-up with her primary care provider at the next appointment regarding elevated blood pressure.  .Denies chest pain, palpitations or shortness of breath, no swelling her legs or feet               Answers for HPI/ROS submitted by the patient on 10/24/2022  Please describe your symptoms.: Cyst burst right inner breast. Bleeding and Grey like oozing. Red, warm to touch, swelling and painful. X 3Days  Have you had these symptoms before?: No  How long have you been having these  symptoms?: 1-4 days  Please describe any probable cause for these symptoms. : Cyst like lesion  What is the primary reason for your visit?: Other

## 2022-10-24 NOTE — PATIENT INSTRUCTIONS
Keep covered, wash undergarments daily.  Use Dial antibacterial body wash daily.   Use triple antibiotic ointment to bandage twice daily.     Continue current plan of care as discussed.   Take medication as ordered (if applicable).    Practice good sleep hygiene.  Eat a well balanced diet with fresh fruit and vegetables.    Drink at least 8 bottles of water or equivalent per day.     Limit sweetened beverages, sodas, juices.    Bake, boil, broil or grill your food, avoid eating fried foods.   Exercise at least 150 minutes per week.

## 2022-10-25 NOTE — ASSESSMENT & PLAN NOTE
Discussed uncontrolled hypertension.  Patient is here in an anxious state and he is concerned about a new onset breast lesion to the right.  She will follow-up with her primary care provider at the next appointment regarding elevated blood pressure.  .Denies chest pain, palpitations or shortness of breath, no swelling her legs or feet

## 2022-10-28 LAB
BACTERIA SPEC AEROBE CULT: NORMAL
BACTERIA SPEC ANAEROBE CULT: NORMAL
BACTERIA SPEC CULT: NORMAL
BACTERIA SPEC CULT: NORMAL

## 2022-11-07 ENCOUNTER — OFFICE VISIT (OUTPATIENT)
Dept: FAMILY MEDICINE CLINIC | Facility: CLINIC | Age: 62
End: 2022-11-07

## 2022-11-07 VITALS
HEART RATE: 75 BPM | OXYGEN SATURATION: 98 % | RESPIRATION RATE: 18 BRPM | BODY MASS INDEX: 37.86 KG/M2 | DIASTOLIC BLOOD PRESSURE: 80 MMHG | TEMPERATURE: 98 F | SYSTOLIC BLOOD PRESSURE: 126 MMHG | WEIGHT: 241.2 LBS | HEIGHT: 67 IN

## 2022-11-07 DIAGNOSIS — E66.01 CLASS 2 SEVERE OBESITY DUE TO EXCESS CALORIES WITH SERIOUS COMORBIDITY AND BODY MASS INDEX (BMI) OF 35.0 TO 35.9 IN ADULT: ICD-10-CM

## 2022-11-07 DIAGNOSIS — N61.1 ABSCESS OF RIGHT BREAST: Primary | ICD-10-CM

## 2022-11-07 PROBLEM — Z01.419 NORMAL PELVIC EXAM: Status: ACTIVE | Noted: 2022-07-26

## 2022-11-07 PROCEDURE — 99213 OFFICE O/P EST LOW 20 MIN: CPT | Performed by: FAMILY MEDICINE

## 2022-11-07 NOTE — ASSESSMENT & PLAN NOTE
Patient's (Body mass index is 37.78 kg/m².) indicates that they are obese (BMI >30) with health conditions that include hypertension . Weight is worsening. BMI is is above average; BMI management plan is completed. We discussed portion control and increasing exercise.

## 2022-11-07 NOTE — PROGRESS NOTES
Subjective   Tirso Palomo is a 61 y.o. female. Presents to [facility]    Chief Complaint   Patient presents with   • Cyst       History of Present Illness          Cyst  This is a new problem. The current episode started 1 to 4 weeks ago. The problem occurs constantly. The problem has been gradually improving. Pertinent negatives include no chest pain, headaches or swollen glands. The symptoms are aggravated by bending. She has tried NSAIDs for the symptoms. The treatment provided mild relief.    right breast. It busted 4 weeks ago. She was placed on bactrim by [facility]    I personally reviewed and updated the patient's allergies, medications, problem list, and past medical, surgical, social, and family history. I have reviewed and confirmed the accuracy of the History of Present Illness and Review of Symptoms as documented by the MA/LPN/RN. Miryam Martinez MD    Allergies:  Allergies   Allergen Reactions   • Lisinopril Cough   • Topamax [Topiramate] GI Intolerance   • Amlodipine Palpitations       Social History:  Social History     Socioeconomic History   • Marital status:    Tobacco Use   • Smoking status: Former     Packs/day: 0.25     Types: Cigarettes     Start date: 1990     Quit date: 2010     Years since quittin.2   • Smokeless tobacco: Never   Vaping Use   • Vaping Use: Never used   Substance and Sexual Activity   • Alcohol use: No   • Drug use: No   • Sexual activity: Yes     Partners: Male     Birth control/protection: Post-menopausal       Family History:  Family History   Problem Relation Age of Onset   • Cancer Mother         breast   • Hypertension Father    • Heart attack Father    • Cancer Father         prostate   • Obesity Brother    • Hypertension Brother    • Arthritis Brother    • Breast cancer Maternal Aunt    • Heart disease Maternal Grandmother    • Diabetes Maternal Grandmother    • Obesity Maternal Grandfather    • Stroke Maternal Grandfather    •  Cancer Paternal Grandmother    • Stroke Paternal Grandfather        Past Medical History :  Patient Active Problem List   Diagnosis   • Chronic fatigue   • Essential hypertension   • Snoring   • Hemorrhoids   • Multiple joint pain   • Acquired hypothyroidism   • Screening mammogram, encounter for   • Screening for cervical cancer   • Screening for colon cancer   • Screening for hyperlipidemia   • Encounter for general adult medical examination with abnormal findings   • Vitamin D deficiency   • Urinary incontinence   • Stress incontinence of urine   • Spondylosis of lumbosacral spine without myelopathy   • Class 2 severe obesity due to excess calories with serious comorbidity and body mass index (BMI) of 35.0 to 35.9 in adult (HCC)   • Menopause present   • Hypermobility of urethra   • History of colonic polyps   • Female bladder prolapse   • Hormone replacement therapy   • Abscess of right breast   • Normal pelvic exam       Medication List:    Current Outpatient Medications:   •  cholecalciferol (VITAMIN D3) 1000 units tablet, Take 1,000 Units by mouth Daily., Disp: , Rfl:   •  levothyroxine (SYNTHROID, LEVOTHROID) 75 MCG tablet, TAKE 1 TABLET BY MOUTH EVERY DAY, Disp: 30 tablet, Rfl: 3  •  metoprolol succinate XL (TOPROL-XL) 50 MG 24 hr tablet, TAKE 1 TABLET BY MOUTH EVERY DAY, Disp: 90 tablet, Rfl: 3  •  Minoxidil 5 % foam, Apply  topically., Disp: , Rfl:   •  Probiotic Product (PROBIOTIC-10 PO), Take  by mouth., Disp: , Rfl:   •  spironolactone (ALDACTONE) 50 MG tablet, Take 100 mg by mouth 2 (Two) Times a Day., Disp: , Rfl:   •  vitamin B-12 (CYANOCOBALAMIN) 1000 MCG tablet, Take 1,000 mcg by mouth Daily., Disp: , Rfl:     Past Surgical History:  Past Surgical History:   Procedure Laterality Date   • BREAST SURGERY Right 2012    biopsy   • COLONOSCOPY  2018   • TOENAIL EXCISION      01/2022   • TUBAL ABDOMINAL LIGATION  1993         Physical Exam:      Vital Signs:    Vitals:    11/07/22 1347   BP: 126/80    Pulse: 75   Resp: 18   Temp: 98 °F (36.7 °C)   SpO2: 98%        Wt Readings from Last 3 Encounters:   11/07/22 109 kg (241 lb 3.2 oz)   10/24/22 105 kg (231 lb)   06/02/22 102 kg (225 lb)       Result Review :   The following data was reviewed by: Miryam Martinez MD on 11/07/2022:    Data reviewed: note from [facility] on 10/24/22       Specimen Information: Breast, Right; Swab   Specimen Comment: Swab  Release to patie    0 Result Notes     1 Patient Communication  Component    Aer Anaer Result 1 Final report    Result 1 Comment     Comment: No anaerobic growth in 72 hours.   Culture Final report    Result 1 Mixed skin surendra           Physical Exam  Vitals reviewed.   Constitutional:       Appearance: Normal appearance. She is well-developed.   HENT:      Head: Normocephalic and atraumatic.   Eyes:      General:         Right eye: No discharge.         Left eye: No discharge.   Cardiovascular:      Rate and Rhythm: Normal rate and regular rhythm.      Heart sounds: Normal heart sounds. No murmur heard.    No friction rub. No gallop.   Pulmonary:      Effort: Pulmonary effort is normal. No respiratory distress.      Breath sounds: Normal breath sounds. No wheezing or rales.   Chest:      Comments: Right breast with healing infected epidermal inclusion cyst. No streaking, no induration or fluctuance  Skin:     General: Skin is warm and dry.      Findings: No rash.   Neurological:      Mental Status: She is alert and oriented to person, place, and time.      Coordination: Coordination normal.      Gait: Gait normal.   Psychiatric:         Behavior: Behavior is cooperative.         Assessment and Plan:  Problems Addressed this Visit        Endocrine and Metabolic    Class 2 severe obesity due to excess calories with serious comorbidity and body mass index (BMI) of 35.0 to 35.9 in adult (HCC)     Patient's (Body mass index is 37.78 kg/m².) indicates that they are obese (BMI >30) with health conditions that include  hypertension . Weight is worsening. BMI is is above average; BMI management plan is completed. We discussed portion control and increasing exercise.             Genitourinary and Reproductive     Abscess of right breast - Primary   Diagnoses       Codes Comments    Abscess of right breast    -  Primary ICD-10-CM: N61.1  ICD-9-CM: 611.0 healing. Continue current treatment. Recheck in a week or so    Class 2 severe obesity due to excess calories with serious comorbidity and body mass index (BMI) of 35.0 to 35.9 in adult ([facility])     ICD-10-CM: E66.01, Z68.35  ICD-9-CM: 278.01, V85.35            Class 2 Severe Obesity (BMI >=35 and <=39.9). Obesity-related health conditions include the following: hypertension. Obesity is worsening. BMI is is above average; BMI management plan is completed. We discussed low calorie, low carb based diet program, portion control and increasing exercise.      An After Visit Summary and PPPS were given to the patient.       I wore protective equipment throughout this patient encounter to include mask and eyewear. Hand hygiene was performed before donning protective equipment and after removal when leaving the room.

## 2022-11-21 NOTE — PROGRESS NOTES
Subjective   Tirso Palomo is a 62 y.o. female. Presents to [facility]    Chief Complaint   Patient presents with   • Breast Problem       Breast Problem  This is a recurrent problem. The current episode started 1 to 4 weeks ago. The problem occurs constantly. Pertinent negatives include no congestion, coughing or visual change. Nothing aggravates the symptoms.      It was an infected epidermal inclusion cyst. Pain is gone since she stopped the hormones.     I personally reviewed and updated the patient's allergies, medications, problem list, and past medical, surgical, social, and family history. I have reviewed and confirmed the accuracy of the History of Present Illness and Review of Symptoms as documented by the MA/LPN/RN. Miryam Martinez MD    Allergies:  Allergies   Allergen Reactions   • Lisinopril Cough   • Topamax [Topiramate] GI Intolerance   • Amlodipine Palpitations       Social History:  Social History     Socioeconomic History   • Marital status:    Tobacco Use   • Smoking status: Former     Packs/day: 0.25     Types: Cigarettes     Start date: 1990     Quit date: 2010     Years since quittin.3   • Smokeless tobacco: Never   Vaping Use   • Vaping Use: Never used   Substance and Sexual Activity   • Alcohol use: No   • Drug use: No   • Sexual activity: Yes     Partners: Male     Birth control/protection: Post-menopausal       Family History:  Family History   Problem Relation Age of Onset   • Cancer Mother         breast   • Hypertension Father    • Heart attack Father    • Cancer Father         prostate   • Obesity Brother    • Hypertension Brother    • Arthritis Brother    • Breast cancer Maternal Aunt    • Heart disease Maternal Grandmother    • Diabetes Maternal Grandmother    • Obesity Maternal Grandfather    • Stroke Maternal Grandfather    • Cancer Paternal Grandmother    • Stroke Paternal Grandfather        Past Medical History :  Patient Active Problem  List   Diagnosis   • Chronic fatigue   • Essential hypertension   • Snoring   • Hemorrhoids   • Multiple joint pain   • Acquired hypothyroidism   • Screening mammogram, encounter for   • Screening for cervical cancer   • Screening for colon cancer   • Screening for hyperlipidemia   • Encounter for general adult medical examination with abnormal findings   • Vitamin D deficiency   • Urinary incontinence   • Stress incontinence of urine   • Spondylosis of lumbosacral spine without myelopathy   • Class 2 severe obesity due to excess calories with serious comorbidity and body mass index (BMI) of 37.0 to 37.9 in adult (HCC)   • Menopause present   • Hypermobility of urethra   • History of colonic polyps   • Female bladder prolapse   • Hormone replacement therapy   • Abscess of right breast   • Normal pelvic exam   • Epidermal inclusion cyst       Medication List:    Current Outpatient Medications:   •  cholecalciferol (VITAMIN D3) 1000 units tablet, Take 1,000 Units by mouth Daily., Disp: , Rfl:   •  levothyroxine (SYNTHROID, LEVOTHROID) 75 MCG tablet, TAKE 1 TABLET BY MOUTH EVERY DAY, Disp: 30 tablet, Rfl: 3  •  metoprolol succinate XL (TOPROL-XL) 50 MG 24 hr tablet, TAKE 1 TABLET BY MOUTH EVERY DAY, Disp: 90 tablet, Rfl: 3  •  Minoxidil 5 % foam, Apply  topically., Disp: , Rfl:   •  Probiotic Product (PROBIOTIC-10 PO), Take  by mouth., Disp: , Rfl:   •  spironolactone (ALDACTONE) 50 MG tablet, Take 100 mg by mouth 2 (Two) Times a Day., Disp: , Rfl:   •  vitamin B-12 (CYANOCOBALAMIN) 1000 MCG tablet, Take 1,000 mcg by mouth Daily., Disp: , Rfl:     Past Surgical History:  Past Surgical History:   Procedure Laterality Date   • BREAST SURGERY Right 2012    biopsy   • COLONOSCOPY  2018   • TOENAIL EXCISION      01/2022   • TUBAL ABDOMINAL LIGATION  1993         Physical Exam:      Vital Signs:    Vitals:    11/23/22 1001   BP: 138/82   Pulse: 83   Resp: 18   Temp: 97.9 °F (36.6 °C)   SpO2: 98%        Wt Readings from Last  3 Encounters:   11/23/22 109 kg (241 lb)   11/07/22 109 kg (241 lb 3.2 oz)   10/24/22 105 kg (231 lb)       Result Review :                Physical Exam  Vitals reviewed.   Constitutional:       Appearance: Normal appearance. She is well-developed.   HENT:      Head: Normocephalic and atraumatic.   Eyes:      General:         Right eye: No discharge.         Left eye: No discharge.   Cardiovascular:      Rate and Rhythm: Normal rate and regular rhythm.      Heart sounds: Normal heart sounds. No murmur heard.    No friction rub. No gallop.   Pulmonary:      Effort: Pulmonary effort is normal. No respiratory distress.      Breath sounds: Normal breath sounds. No wheezing or rales.   Skin:     General: Skin is warm and dry.      Findings: No rash.      Comments: Right breast medially healed epidermal inclusion cyst, nontender   Neurological:      Mental Status: She is alert and oriented to person, place, and time.      Coordination: Coordination normal.      Gait: Gait normal.   Psychiatric:         Behavior: Behavior is cooperative.         Assessment and Plan:  Problems Addressed this Visit        Endocrine and Metabolic    Class 2 severe obesity due to excess calories with serious comorbidity and body mass index (BMI) of 37.0 to 37.9 in adult ([facility]) - Primary       Genitourinary and Reproductive     Abscess of right breast       Skin    Epidermal inclusion cyst     Right medial breast    Healed            Diagnoses       Codes Comments    Class 2 severe obesity due to excess calories with serious comorbidity and body mass index (BMI) of 37.0 to 37.9 in adult ([facility])    -  Primary ICD-10-CM: E66.01, Z68.37  ICD-9-CM: 278.01, V85.37     Abscess of right breast     ICD-10-CM: N61.1  ICD-9-CM: 611.0 resolved    Epidermal inclusion cyst     ICD-10-CM: L72.0  ICD-9-CM: 706.2            Class 2 Severe Obesity (BMI >=35 and <=39.9). Obesity-related health conditions include the following: hypertension. Obesity is unchanged.  BMI is is above average; BMI management plan is completed. We discussed low calorie, low carb based diet program, portion control and increasing exercise.      An After Visit Summary and PPPS were given to the patient.       I wore protective equipment throughout this patient encounter to include mask and eyewear. Hand hygiene was performed before donning protective equipment and after removal when leaving the room.

## 2022-11-23 ENCOUNTER — OFFICE VISIT (OUTPATIENT)
Dept: FAMILY MEDICINE CLINIC | Facility: CLINIC | Age: 62
End: 2022-11-23

## 2022-11-23 VITALS
TEMPERATURE: 97.9 F | OXYGEN SATURATION: 98 % | HEART RATE: 83 BPM | HEIGHT: 67 IN | DIASTOLIC BLOOD PRESSURE: 82 MMHG | BODY MASS INDEX: 37.83 KG/M2 | RESPIRATION RATE: 18 BRPM | SYSTOLIC BLOOD PRESSURE: 138 MMHG | WEIGHT: 241 LBS

## 2022-11-23 DIAGNOSIS — N61.1 ABSCESS OF RIGHT BREAST: ICD-10-CM

## 2022-11-23 DIAGNOSIS — E66.01 CLASS 2 SEVERE OBESITY DUE TO EXCESS CALORIES WITH SERIOUS COMORBIDITY AND BODY MASS INDEX (BMI) OF 37.0 TO 37.9 IN ADULT: Primary | ICD-10-CM

## 2022-11-23 DIAGNOSIS — L72.0 EPIDERMAL INCLUSION CYST: ICD-10-CM

## 2022-11-23 PROCEDURE — 99212 OFFICE O/P EST SF 10 MIN: CPT | Performed by: FAMILY MEDICINE

## 2022-12-06 DIAGNOSIS — E03.9 ACQUIRED HYPOTHYROIDISM: ICD-10-CM

## 2022-12-06 DIAGNOSIS — I10 ESSENTIAL HYPERTENSION: ICD-10-CM

## 2022-12-06 RX ORDER — LEVOTHYROXINE SODIUM 0.07 MG/1
75 TABLET ORAL DAILY
Qty: 30 TABLET | Refills: 3 | Status: SHIPPED | OUTPATIENT
Start: 2022-12-06

## 2022-12-06 RX ORDER — METOPROLOL SUCCINATE 50 MG/1
50 TABLET, EXTENDED RELEASE ORAL DAILY
Qty: 90 TABLET | Refills: 3 | Status: SHIPPED | OUTPATIENT
Start: 2022-12-06

## 2023-02-02 ENCOUNTER — CONSULT (OUTPATIENT)
Dept: BARIATRICS/WEIGHT MGMT | Facility: CLINIC | Age: 63
End: 2023-02-02
Payer: COMMERCIAL

## 2023-02-02 ENCOUNTER — PREP FOR SURGERY (OUTPATIENT)
Dept: OTHER | Facility: HOSPITAL | Age: 63
End: 2023-02-02
Payer: COMMERCIAL

## 2023-02-02 VITALS
RESPIRATION RATE: 15 BRPM | WEIGHT: 248.8 LBS | HEIGHT: 67 IN | SYSTOLIC BLOOD PRESSURE: 135 MMHG | BODY MASS INDEX: 39.05 KG/M2 | OXYGEN SATURATION: 99 % | HEART RATE: 64 BPM | DIASTOLIC BLOOD PRESSURE: 79 MMHG

## 2023-02-02 DIAGNOSIS — Z01.818 PRE-OP EXAM: ICD-10-CM

## 2023-02-02 DIAGNOSIS — E66.9 OBESITY, CLASS II, BMI 35-39.9: Primary | ICD-10-CM

## 2023-02-02 DIAGNOSIS — Z71.3 NUTRITIONAL COUNSELING: ICD-10-CM

## 2023-02-02 DIAGNOSIS — Z01.818 PRE-OPERATIVE CLEARANCE: ICD-10-CM

## 2023-02-02 PROBLEM — E66.812 OBESITY, CLASS II, BMI 35-39.9: Status: ACTIVE | Noted: 2023-02-02

## 2023-02-02 PROCEDURE — 99215 OFFICE O/P EST HI 40 MIN: CPT | Performed by: NURSE PRACTITIONER

## 2023-02-02 RX ORDER — ESTRADIOL 0.5 MG/1
TABLET ORAL
COMMUNITY
Start: 2022-12-22 | End: 2023-03-17 | Stop reason: HOSPADM

## 2023-02-02 RX ORDER — AMOXICILLIN 500 MG
1200 CAPSULE ORAL DAILY
COMMUNITY
End: 2023-03-30

## 2023-02-02 RX ORDER — SPIRONOLACTONE 100 MG/1
1 TABLET, FILM COATED ORAL 2 TIMES DAILY
COMMUNITY
Start: 2022-12-22

## 2023-02-02 RX ORDER — SODIUM CHLORIDE 9 MG/ML
30 INJECTION, SOLUTION INTRAVENOUS CONTINUOUS PRN
Status: CANCELLED | OUTPATIENT
Start: 2023-02-02

## 2023-02-02 RX ORDER — LIDOCAINE, MENTHOL 4; 1 G/100G; G/100G
1 CREAM TOPICAL AS NEEDED
COMMUNITY
End: 2023-03-30

## 2023-02-02 RX ORDER — SODIUM CHLORIDE 0.9 % (FLUSH) 0.9 %
10 SYRINGE (ML) INJECTION AS NEEDED
Status: CANCELLED | OUTPATIENT
Start: 2023-02-02

## 2023-02-02 NOTE — PROGRESS NOTES
[facility] BARIATRIC SURGERY  [address] [place] [address]  [address] [place] [address]  Dept: [phone]  2/2/2023      Tirso Palomo.  [id number]  [id number]  1960  female      Chief Complaint of weight gain; unable to maintain weight loss    History of Present Illness:   Tirso is a 62 y.o. female who presents today for evaluation, education and consultation regarding weight loss surgery. The patient is interested in the sleeve gastrectomy.      Diet History:See dietician/RN/MA documentation for complete history of weight and diet.     Bariatric Surgery Evaluation: The patient is being seen for an initial visit for bariatric surgery evaluation.     Past diet attempts:   Calibrate diet , high protein, low carb, low fat, calorie counting, slim fast, fasting, meliton janet, weight watchers, medications: contrave, xenical, diuretics, amphetamines, adipex     Past exercise attempts: running/ walking, swimming, exercise bike     Breakfast: coffee , if eating breakfast, cereal special k , breakfast burrito   Lunch: left overs from night before , arbys mediterranean wrap   Dinner: No beef or chicken on a bone , fish , pork prn   Drinks: water, cranberry / orange juice, milo tea, vitamin water, no carbonation , lemonade   Snacks: savory snacks- wheat thins or triscuits   Exercise: belong to a walking club     Patient Active Problem List   Diagnosis   • Chronic fatigue   • Essential hypertension   • Snoring   • Hemorrhoids   • Multiple joint pain   • Acquired hypothyroidism   • Screening mammogram, encounter for   • Screening for cervical cancer   • Screening for colon cancer   • Screening for hyperlipidemia   • Encounter for general adult medical examination with abnormal findings   • Vitamin D deficiency   • Urinary incontinence   • Stress incontinence of urine   • Spondylosis of lumbosacral spine without myelopathy   • Class 2  severe obesity due to excess calories with serious comorbidity and body mass index (BMI) of 37.0 to 37.9 in adult (HCC)   • Menopause present   • Hypermobility of urethra   • History of colonic polyps   • Female bladder prolapse   • Hormone replacement therapy   • Abscess of right breast   • Normal pelvic exam   • Epidermal inclusion cyst   hydrocele     Past Medical History:   Diagnosis Date   • Allergic    • Arthritis    • Elevated cholesterol    • Hypertension    • Hypothyroidism    • Insomnia    • Sciatica    not on any HLD medications but fish oil     Past Surgical History:   Procedure Laterality Date   • BREAST SURGERY Right 2012    biopsy   • COLONOSCOPY  2018   • TOENAIL EXCISION      01/2022   • TUBAL ABDOMINAL LIGATION  1993   endometrial ablation     Allergies   Allergen Reactions   • Lisinopril Cough   • Topamax [Topiramate] GI Intolerance   • Amlodipine Palpitations         Current Outpatient Medications:   •  Apoaequorin (Prevagen) 10 MG capsule, Take 1 capsule by mouth Daily., Disp: , Rfl:   •  cholecalciferol (VITAMIN D3) 1000 units tablet, Take 1,000 Units by mouth Daily., Disp: , Rfl:   •  estradiol (ESTRACE) 0.5 MG tablet, TAKE 1/2 TABLETS IN THE VAGINA DAILY FOR 2 WEEKS THEN TWICE WEEKLY THEREAFTER., Disp: , Rfl:   •  levothyroxine (SYNTHROID, LEVOTHROID) 75 MCG tablet, Take 1 tablet by mouth Daily., Disp: 30 tablet, Rfl: 3  •  metoprolol succinate XL (TOPROL-XL) 50 MG 24 hr tablet, Take 1 tablet by mouth Daily., Disp: 90 tablet, Rfl: 3  •  Minoxidil 5 % foam, Apply  topically., Disp: , Rfl:   •  Omega-3 Fatty Acids (fish oil) 1200 MG capsule capsule, Take 1,200 mg by mouth Daily., Disp: , Rfl:   •  Phenazopyridine HCl (AZO TABS PO), Take 1 tablet by mouth Daily., Disp: , Rfl:   •  Probiotic Product (PROBIOTIC-10 PO), Take  by mouth., Disp: , Rfl:   •  spironolactone (ALDACTONE) 100 MG tablet, Take 1 tablet by mouth 2 (Two) Times a Day., Disp: , Rfl:   •  vitamin B-12 (CYANOCOBALAMIN) 1000 MCG  tablet, Take 1,000 mcg by mouth Daily., Disp: , Rfl:   •  spironolactone (ALDACTONE) 50 MG tablet, Take 100 mg by mouth 2 (Two) Times a Day., Disp: , Rfl:     Social History     Socioeconomic History   • Marital status:    Tobacco Use   • Smoking status: Former     Packs/day: 0.25     Types: Cigarettes     Start date: 1990     Quit date: 2010     Years since quittin.5   • Smokeless tobacco: Never   Vaping Use   • Vaping Use: Never used   Substance and Sexual Activity   • Alcohol use: No   • Drug use: No   • Sexual activity: Yes     Partners: Male     Birth control/protection: Post-menopausal       Family History   Problem Relation Age of Onset   • Cancer Mother         breast   • Hypertension Father    • Heart attack Father    • Cancer Father         prostate   • Obesity Brother    • Hypertension Brother    • Arthritis Brother    • Breast cancer Maternal Aunt    • Heart disease Maternal Grandmother    • Diabetes Maternal Grandmother    • Obesity Maternal Grandfather    • Stroke Maternal Grandfather    • Hypertension Maternal Grandfather    • Cancer Paternal Grandmother    • Stroke Paternal Grandfather    • Heart attack Paternal Grandfather    • Hypertension Paternal Grandfather          Review of Systems:  Review of Systems   Constitutional:        Weight gain, insomnia,hair loss   HENT:        Wears contacts/ glasses, sinus drainage, dentures partial,    Respiratory:        Snoring, wheezing at times   Cardiovascular:        HTN, HLD, ankle swelling   Gastrointestinal: Positive for constipation.   Endocrine:        Hypothyroidism    Genitourinary:        Kidney stones, leaking urine with laughing/ sneezing/ coughing    Musculoskeletal:        Hip, knee, heel, back pain, arthritis, broken bones, myalgia, sciatica    Skin: Negative.    Neurological: Negative.    Hematological: Negative.    Psychiatric/Behavioral: Negative.        Physical Exam:  Vital Signs:  Weight: 113 kg (248 lb 12.8 oz)   Body  mass index is 39.56 kg/m².      Heart Rate: 64   BP: 135/79     Physical Exam  Constitutional:       Appearance: Normal appearance. She is obese.   Cardiovascular:      Pulses: Normal pulses.   Pulmonary:      Effort: Pulmonary effort is normal.      Breath sounds: Normal breath sounds.   Abdominal:      General: Abdomen is flat. Bowel sounds are normal.      Palpations: Abdomen is soft.   Skin:     General: Skin is warm and dry.   Neurological:      General: No focal deficit present.      Mental Status: She is alert and oriented to person, place, and time.   Psychiatric:         Mood and Affect: Mood normal.         Behavior: Behavior normal.         Thought Content: Thought content normal.         Judgment: Judgment normal.            Assessment:         Tirso Palomo is a 62 y.o. year old female with medically complicated severe obesity. Weight: 113 kg (248 lb 12.8 oz), Body mass index is 39.56 kg/m². and weight related problems.    I explained in detail the procedures that we are performing.  All of those procedures can be performed laparoscopically but there is a chance to convert to open if any technical challenges or complications do occur.  Bariatric surgery is not cosmetic surgery but rather a tool to help a patient make a life-long commitment lifestyle changes including diet, exercise, behavior changes, and taking supplemental vitamins and minerals.    Due to the patient's BMI and co-morbidities they are at a high risk for surgery and will obtain the following:  The patient has been advised that a letter of medical support and a history and physical must be obtained from her primary care physician. A psychological evaluation will be arranged for this patient. CBC, CMP, FLP, TSH and HgbA1C will be drawn. Tirso Palomo will obtain a pre-operative CXR and EKG.    Tirso Palomo will be set up for a pre-operative diagnostic esophagogastroduodenoscopy with biopsy for evaluation. The risks and benefits  of the procedure were discussed with the patient in detail and all questions were answered.  Possibility of perforation, bleeding, aspiration, anoxic brain injury, respiratory and/or cardiac arrest and death were discussed.   She received handouts regarding, all questions were answered and informed consent was obtained.     The risks, benefits, alternatives, and potential complications of all of the procedures were explained in detail including, but not limited to death, anesthesia and medication adverse effect/DVT, pulmonary embolism, trocar site/incisional hernia, wound infection, abdominal infection, bleeding, failure to lose weight or gain weight and change in body image, metabolic complications with calcium, thiamine, vitamin B12, folate, iron, and anemia.    The patient was advised to start a high protein, low fat and low carbohydrate diet. The patient was given individualized information  along with general group information and handouts.     The patient was encouraged to start routine exercise including but not limited to 150 minutes per week.     The consultation plan was reviewed with the patient.    The patient understands the surgical procedures and the different surgical options that are available.  She understands the lifestyle changes that would be required after surgery and has agreed to participate in a pre-operative and postoperative weight management program.  She also expressed understanding of possible risks, had several questions answered and desires to proceed.    I think she is a good candidate for this surgery, and is interested in a sleeve gastrectomy.    Encounter Diagnoses   Name Primary?   • Obesity, Class II, BMI 35-39.9 Yes   • Pre-operative clearance        Plan:    Patient will have evaluations and follow up with bariatric dieticians and a psychologist before undergoing a multidisciplinary review of her candidacy.  We also discussed the weight loss requirement and rationale, and  other program requirements.    PT will need an EGD prior to surgery. Plan to follow up for EGD. Pt does not need SWL for insurance but will need bluegrass clearance prior to surgery.     Total time spent with pt 60 minutes of which 45 minutes are spent on education.      Oriana Mcmillan, APRN  2/2/2023

## 2023-02-02 NOTE — H&P (VIEW-ONLY)
[facility] BARIATRIC SURGERY  [address] [place] [address]  [address] [place] [address]  Dept: [phone]  2/2/2023      Tirso Palomo.  [id number]  [id number]  1960  female      Chief Complaint of weight gain; unable to maintain weight loss    History of Present Illness:   Tirso is a 62 y.o. female who presents today for evaluation, education and consultation regarding weight loss surgery. The patient is interested in the sleeve gastrectomy.      Diet History:See dietician/RN/MA documentation for complete history of weight and diet.     Bariatric Surgery Evaluation: The patient is being seen for an initial visit for bariatric surgery evaluation.     Past diet attempts:   Calibrate diet , high protein, low carb, low fat, calorie counting, slim fast, fasting, meliton janet, weight watchers, medications: contrave, xenical, diuretics, amphetamines, adipex     Past exercise attempts: running/ walking, swimming, exercise bike     Breakfast: coffee , if eating breakfast, cereal special k , breakfast burrito   Lunch: left overs from night before , arbys mediterranean wrap   Dinner: No beef or chicken on a bone , fish , pork prn   Drinks: water, cranberry / orange juice, milo tea, vitamin water, no carbonation , lemonade   Snacks: savory snacks- wheat thins or triscuits   Exercise: belong to a walking club     Patient Active Problem List   Diagnosis   • Chronic fatigue   • Essential hypertension   • Snoring   • Hemorrhoids   • Multiple joint pain   • Acquired hypothyroidism   • Screening mammogram, encounter for   • Screening for cervical cancer   • Screening for colon cancer   • Screening for hyperlipidemia   • Encounter for general adult medical examination with abnormal findings   • Vitamin D deficiency   • Urinary incontinence   • Stress incontinence of urine   • Spondylosis of lumbosacral spine without myelopathy   • Class 2  severe obesity due to excess calories with serious comorbidity and body mass index (BMI) of 37.0 to 37.9 in adult (HCC)   • Menopause present   • Hypermobility of urethra   • History of colonic polyps   • Female bladder prolapse   • Hormone replacement therapy   • Abscess of right breast   • Normal pelvic exam   • Epidermal inclusion cyst   hydrocele     Past Medical History:   Diagnosis Date   • Allergic    • Arthritis    • Elevated cholesterol    • Hypertension    • Hypothyroidism    • Insomnia    • Sciatica    not on any HLD medications but fish oil     Past Surgical History:   Procedure Laterality Date   • BREAST SURGERY Right 2012    biopsy   • COLONOSCOPY  2018   • TOENAIL EXCISION      01/2022   • TUBAL ABDOMINAL LIGATION  1993   endometrial ablation     Allergies   Allergen Reactions   • Lisinopril Cough   • Topamax [Topiramate] GI Intolerance   • Amlodipine Palpitations         Current Outpatient Medications:   •  Apoaequorin (Prevagen) 10 MG capsule, Take 1 capsule by mouth Daily., Disp: , Rfl:   •  cholecalciferol (VITAMIN D3) 1000 units tablet, Take 1,000 Units by mouth Daily., Disp: , Rfl:   •  estradiol (ESTRACE) 0.5 MG tablet, TAKE 1/2 TABLETS IN THE VAGINA DAILY FOR 2 WEEKS THEN TWICE WEEKLY THEREAFTER., Disp: , Rfl:   •  levothyroxine (SYNTHROID, LEVOTHROID) 75 MCG tablet, Take 1 tablet by mouth Daily., Disp: 30 tablet, Rfl: 3  •  metoprolol succinate XL (TOPROL-XL) 50 MG 24 hr tablet, Take 1 tablet by mouth Daily., Disp: 90 tablet, Rfl: 3  •  Minoxidil 5 % foam, Apply  topically., Disp: , Rfl:   •  Omega-3 Fatty Acids (fish oil) 1200 MG capsule capsule, Take 1,200 mg by mouth Daily., Disp: , Rfl:   •  Phenazopyridine HCl (AZO TABS PO), Take 1 tablet by mouth Daily., Disp: , Rfl:   •  Probiotic Product (PROBIOTIC-10 PO), Take  by mouth., Disp: , Rfl:   •  spironolactone (ALDACTONE) 100 MG tablet, Take 1 tablet by mouth 2 (Two) Times a Day., Disp: , Rfl:   •  vitamin B-12 (CYANOCOBALAMIN) 1000 MCG  tablet, Take 1,000 mcg by mouth Daily., Disp: , Rfl:   •  spironolactone (ALDACTONE) 50 MG tablet, Take 100 mg by mouth 2 (Two) Times a Day., Disp: , Rfl:     Social History     Socioeconomic History   • Marital status:    Tobacco Use   • Smoking status: Former     Packs/day: 0.25     Types: Cigarettes     Start date: 1990     Quit date: 2010     Years since quittin.5   • Smokeless tobacco: Never   Vaping Use   • Vaping Use: Never used   Substance and Sexual Activity   • Alcohol use: No   • Drug use: No   • Sexual activity: Yes     Partners: Male     Birth control/protection: Post-menopausal       Family History   Problem Relation Age of Onset   • Cancer Mother         breast   • Hypertension Father    • Heart attack Father    • Cancer Father         prostate   • Obesity Brother    • Hypertension Brother    • Arthritis Brother    • Breast cancer Maternal Aunt    • Heart disease Maternal Grandmother    • Diabetes Maternal Grandmother    • Obesity Maternal Grandfather    • Stroke Maternal Grandfather    • Hypertension Maternal Grandfather    • Cancer Paternal Grandmother    • Stroke Paternal Grandfather    • Heart attack Paternal Grandfather    • Hypertension Paternal Grandfather          Review of Systems:  Review of Systems   Constitutional:        Weight gain, insomnia,hair loss   HENT:        Wears contacts/ glasses, sinus drainage, dentures partial,    Respiratory:        Snoring, wheezing at times   Cardiovascular:        HTN, HLD, ankle swelling   Gastrointestinal: Positive for constipation.   Endocrine:        Hypothyroidism    Genitourinary:        Kidney stones, leaking urine with laughing/ sneezing/ coughing    Musculoskeletal:        Hip, knee, heel, back pain, arthritis, broken bones, myalgia, sciatica    Skin: Negative.    Neurological: Negative.    Hematological: Negative.    Psychiatric/Behavioral: Negative.        Physical Exam:  Vital Signs:  Weight: 113 kg (248 lb 12.8 oz)   Body  mass index is 39.56 kg/m².      Heart Rate: 64   BP: 135/79     Physical Exam  Constitutional:       Appearance: Normal appearance. She is obese.   Cardiovascular:      Pulses: Normal pulses.   Pulmonary:      Effort: Pulmonary effort is normal.      Breath sounds: Normal breath sounds.   Abdominal:      General: Abdomen is flat. Bowel sounds are normal.      Palpations: Abdomen is soft.   Skin:     General: Skin is warm and dry.   Neurological:      General: No focal deficit present.      Mental Status: She is alert and oriented to person, place, and time.   Psychiatric:         Mood and Affect: Mood normal.         Behavior: Behavior normal.         Thought Content: Thought content normal.         Judgment: Judgment normal.            Assessment:         Tirso Palomo is a 62 y.o. year old female with medically complicated severe obesity. Weight: 113 kg (248 lb 12.8 oz), Body mass index is 39.56 kg/m². and weight related problems.    I explained in detail the procedures that we are performing.  All of those procedures can be performed laparoscopically but there is a chance to convert to open if any technical challenges or complications do occur.  Bariatric surgery is not cosmetic surgery but rather a tool to help a patient make a life-long commitment lifestyle changes including diet, exercise, behavior changes, and taking supplemental vitamins and minerals.    Due to the patient's BMI and co-morbidities they are at a high risk for surgery and will obtain the following:  The patient has been advised that a letter of medical support and a history and physical must be obtained from her primary care physician. A psychological evaluation will be arranged for this patient. CBC, CMP, FLP, TSH and HgbA1C will be drawn. Tirso Palomo will obtain a pre-operative CXR and EKG.    Tirso Palomo will be set up for a pre-operative diagnostic esophagogastroduodenoscopy with biopsy for evaluation. The risks and benefits  of the procedure were discussed with the patient in detail and all questions were answered.  Possibility of perforation, bleeding, aspiration, anoxic brain injury, respiratory and/or cardiac arrest and death were discussed.   She received handouts regarding, all questions were answered and informed consent was obtained.     The risks, benefits, alternatives, and potential complications of all of the procedures were explained in detail including, but not limited to death, anesthesia and medication adverse effect/DVT, pulmonary embolism, trocar site/incisional hernia, wound infection, abdominal infection, bleeding, failure to lose weight or gain weight and change in body image, metabolic complications with calcium, thiamine, vitamin B12, folate, iron, and anemia.    The patient was advised to start a high protein, low fat and low carbohydrate diet. The patient was given individualized information  along with general group information and handouts.     The patient was encouraged to start routine exercise including but not limited to 150 minutes per week.     The consultation plan was reviewed with the patient.    The patient understands the surgical procedures and the different surgical options that are available.  She understands the lifestyle changes that would be required after surgery and has agreed to participate in a pre-operative and postoperative weight management program.  She also expressed understanding of possible risks, had several questions answered and desires to proceed.    I think she is a good candidate for this surgery, and is interested in a sleeve gastrectomy.    Encounter Diagnoses   Name Primary?   • Obesity, Class II, BMI 35-39.9 Yes   • Pre-operative clearance        Plan:    Patient will have evaluations and follow up with bariatric dieticians and a psychologist before undergoing a multidisciplinary review of her candidacy.  We also discussed the weight loss requirement and rationale, and  other program requirements.    PT will need an EGD prior to surgery. Plan to follow up for EGD. Pt does not need SWL for insurance but will need bluegrass clearance prior to surgery.     Total time spent with pt 60 minutes of which 45 minutes are spent on education.      Oriana Mcmillan, APRN  2/2/2023

## 2023-02-03 ENCOUNTER — PATIENT ROUNDING (BHMG ONLY) (OUTPATIENT)
Dept: BARIATRICS/WEIGHT MGMT | Facility: CLINIC | Age: 63
End: 2023-02-03
Payer: COMMERCIAL

## 2023-02-03 NOTE — PROGRESS NOTES
February 3, 2023    Hello, may I speak with Tirso Palomo?    My name is RICKEY    I am  with [facility] BARIATRIC SURGERY  [address] [place] [address].    Before we get started may I verify your date of birth? 1960    I am calling to officially welcome you to our practice and ask about your recent visit. Is this a good time to talk? no  Tell me about your visit with us. What things went well?         We're always looking for ways to make our patients' experiences even better. Do you have recommendations on ways we may improve?      Overall were you satisfied with your first visit to our practice?        I appreciate you taking the time to speak with me today. Is there anything else I can do for you?       Thank you, and have a great day.

## 2023-02-08 ENCOUNTER — TELEPHONE (OUTPATIENT)
Dept: BARIATRICS/WEIGHT MGMT | Facility: CLINIC | Age: 63
End: 2023-02-08
Payer: COMMERCIAL

## 2023-02-08 ENCOUNTER — OFFICE VISIT (OUTPATIENT)
Dept: BARIATRICS/WEIGHT MGMT | Facility: CLINIC | Age: 63
End: 2023-02-08
Payer: COMMERCIAL

## 2023-02-08 DIAGNOSIS — E66.9 OBESITY, CLASS II, BMI 35-39.9: Primary | ICD-10-CM

## 2023-02-08 NOTE — TELEPHONE ENCOUNTER
Patient is scheduled for her EGD tomorrow. She states she saw on the instructions on medications to stop prior to the procedure 14 days, is Valerian Root. Pt. States takes a herbal colon cleanse that has Valerian Root in it. Stopped taking it on Saturday when read the instructions. States pre admission testing told her to call us to see if can proceed with procedure tomorrow or if needs to RS once been off for 14 days.

## 2023-02-08 NOTE — PROGRESS NOTES
"Nutrition Services    Patient Name: Tirso Palomo  YOB: 1960  MRN: [id number]  Date of Service: 02/08/23    RD Recommendation        Candidacy for surgery? Good   RD Comments Recommend this pt for bariatric surgery      NUTRITION ASSESSMENT - BARIATRIC SURGERY      Reason for Visit 2/8: Nutrition evaluation and supervised wt loss w/ RD      H&P      Past Medical History:   Diagnosis Date   • Allergic    • Arthritis    • Elevated cholesterol    • Hypertension    • Hypothyroidism    • Insomnia    • Sciatica        Past Surgical History:   Procedure Laterality Date   • BREAST SURGERY Right 2012    biopsy   • COLONOSCOPY  2018   • TOENAIL EXCISION      01/2022   • TUBAL ABDOMINAL LIGATION  1993              Encounter Information        Visit Narrative     2/8: Pt states she has been tracking fluid intake and has improved to 40 oz/day. Pt states she likes savory foods. Discussed diet recall, pt states she is aware lots of these foods are \"bad foods\". Discussed different nutritional value of foods and striving to achieve a variety while still eating foods we enjoy. Encouraged pt to focus on protein intake by incorporating protein source at every meal. Also encouraged pt to try protein shakes and begin including 1x/day.     Diet Recall:   Breakfast: sour dough bread w/ butter, coffee  Lunch: sesame sticks + peanuts  Dinner: tortellini chicken parmesan   Snacks: lemon w/ salt, sesame sticks   Beverages: water, no carbonated beverages     Exercise: walking    Supplements: herbal supplements for colon cleanse, hair/skin/nails supplement    Self Monitoring: writes intake down in a journal          Anthropometrics        Current Height, Weight  248 lb, 66.5\"          Trending Weight Hx  Wt up 3% x 2 months     Wt Readings from Last 30 Encounters:   02/02/23 0756 113 kg (248 lb 12.8 oz)   11/23/22 1001 109 kg (241 lb)   11/07/22 1347 109 kg (241 lb 3.2 oz)   10/24/22 0959 105 kg (231 lb)   06/02/22 0924 102 kg " (225 lb)   04/13/21 1443 116 kg (256 lb 12.8 oz)      BMI kg/m2 39.4 kg/m^2       Nutrition Diagnosis         Nutrition Dx Statement Overweight/obesity R/t multifactorial biochemical, behavioral, and environmental contributors to Dz; as evidenced by BMI 39.4 kg/m^2.       Monitor/Evaluation        New Goals -Include protein source at every meal   -Protein shake 1x/day      Electronically signed by:  Kaila Juarez RD  02/08/23 15:39 EST

## 2023-02-09 ENCOUNTER — HOSPITAL ENCOUNTER (OUTPATIENT)
Facility: HOSPITAL | Age: 63
Setting detail: HOSPITAL OUTPATIENT SURGERY
Discharge: HOME | End: 2023-02-09
Attending: SURGERY | Admitting: SURGERY
Payer: COMMERCIAL

## 2023-02-09 ENCOUNTER — ANESTHESIA (OUTPATIENT)
Dept: GASTROENTEROLOGY | Facility: HOSPITAL | Age: 63
End: 2023-02-09
Payer: COMMERCIAL

## 2023-02-09 ENCOUNTER — ANESTHESIA EVENT (OUTPATIENT)
Dept: GASTROENTEROLOGY | Facility: HOSPITAL | Age: 63
End: 2023-02-09
Payer: COMMERCIAL

## 2023-02-09 VITALS
TEMPERATURE: 98.1 F | SYSTOLIC BLOOD PRESSURE: 131 MMHG | WEIGHT: 244.71 LBS | RESPIRATION RATE: 31 BRPM | HEIGHT: 66 IN | BODY MASS INDEX: 39.33 KG/M2 | DIASTOLIC BLOOD PRESSURE: 61 MMHG | HEART RATE: 69 BPM | OXYGEN SATURATION: 93 %

## 2023-02-09 DIAGNOSIS — E66.9 OBESITY, CLASS II, BMI 35-39.9: ICD-10-CM

## 2023-02-09 PROCEDURE — 25010000002 PROPOFOL 200 MG/20ML EMULSION: Performed by: NURSE ANESTHETIST, CERTIFIED REGISTERED

## 2023-02-09 PROCEDURE — 43239 EGD BIOPSY SINGLE/MULTIPLE: CPT | Performed by: SURGERY

## 2023-02-09 PROCEDURE — 88305 TISSUE EXAM BY PATHOLOGIST: CPT | Performed by: SURGERY

## 2023-02-09 RX ORDER — SODIUM CHLORIDE 0.9 % (FLUSH) 0.9 %
10 SYRINGE (ML) INJECTION AS NEEDED
Status: DISCONTINUED | OUTPATIENT
Start: 2023-02-09 | End: 2023-02-09 | Stop reason: HOSPADM

## 2023-02-09 RX ORDER — SODIUM CHLORIDE 9 MG/ML
30 INJECTION, SOLUTION INTRAVENOUS CONTINUOUS PRN
Status: DISCONTINUED | OUTPATIENT
Start: 2023-02-09 | End: 2023-02-09 | Stop reason: HOSPADM

## 2023-02-09 RX ORDER — PROPOFOL 10 MG/ML
INJECTION, EMULSION INTRAVENOUS AS NEEDED
Status: DISCONTINUED | OUTPATIENT
Start: 2023-02-09 | End: 2023-02-09 | Stop reason: SURG

## 2023-02-09 RX ADMIN — SODIUM CHLORIDE 30 ML/HR: 9 INJECTION, SOLUTION INTRAVENOUS at 11:20

## 2023-02-09 RX ADMIN — PROPOFOL 50 MG: 10 INJECTION, EMULSION INTRAVENOUS at 12:12

## 2023-02-09 RX ADMIN — PROPOFOL 50 MG: 10 INJECTION, EMULSION INTRAVENOUS at 12:15

## 2023-02-09 RX ADMIN — PROPOFOL 50 MG: 10 INJECTION, EMULSION INTRAVENOUS at 12:17

## 2023-02-09 RX ADMIN — PROPOFOL 100 MG: 10 INJECTION, EMULSION INTRAVENOUS at 12:08

## 2023-02-09 NOTE — ANESTHESIA PREPROCEDURE EVALUATION
Anesthesia Evaluation     Patient summary reviewed and Nursing notes reviewed   history of anesthetic complications:  NPO Solid Status: > 8 hours  NPO Liquid Status: > 8 hours           Airway   Mallampati: II  Dental    (+) partials    Pulmonary    (+) a smoker Former,   (-) no home oxygen  Cardiovascular     (+) hypertension, hyperlipidemia,   (-) past MI, dysrhythmias, angina, CHF, cardiac stents, CABG      Neuro/Psych  (+) numbness,    (-) seizures, CVA  GI/Hepatic/Renal/Endo    (+) obesity, morbid obesity,  thyroid problem hypothyroidism    Musculoskeletal     Abdominal    Substance History      OB/GYN          Other   arthritis,                      Anesthesia Plan    ASA 3     general     (For EGD  Pre bariatric surgery  Multiple colonoscopies without incident, sensitive to benadryl so please avoid    Mod risk pt for low risk procedure  )  intravenous induction     Anesthetic plan, risks, benefits, and alternatives have been provided, discussed and informed consent has been obtained with: patient and spouse/significant other.        CODE STATUS:

## 2023-02-09 NOTE — DISCHARGE INSTRUCTIONS
A responsible adult should stay with you and you should rest quietly for the rest of the day.    Do not drink alcohol, drive, operate any heavy machinery or power tools or make any legal/important decisions for the next 24 hours.     Progress your diet as tolerated.  If you begin to experience severe pain, increased shortness of breath, racing heartbeat or a fever above 101 F, seek immediate medical attention.     Follow up with MD as instructed. Call office for results in 3 to 5 days if needed. [phone]    Postoperative Diagnosis: normal

## 2023-02-09 NOTE — ANESTHESIA POSTPROCEDURE EVALUATION
Patient: Tirso Palomo    Procedure Summary     Date: 02/09/23 Room / Location: [facility] ENDOSCOPY 4 / [facility] ENDOSCOPY    Anesthesia Start: 1204 Anesthesia Stop: 1219    Procedure: ESOPHAGOGASTRODUODENOSCOPY with gastric biopsy Diagnosis:       Obesity, Class II, BMI 35-39.9      (Obesity, Class II, BMI 35-39.9 [E66.9])    Surgeons: Yue Jones MD Provider: Mare Green DO    Anesthesia Type: general ASA Status: 3          Anesthesia Type: general    Vitals  Vitals Value Taken Time   BP 92/55 02/09/23 1237   Temp     Pulse 65 02/09/23 1237   Resp 31 02/09/23 1225   SpO2 96 % 02/09/23 1235   Vitals shown include unvalidated device data.        Post Anesthesia Care and Evaluation    Patient location during evaluation: PHASE II  Patient participation: complete - patient participated  Level of consciousness: awake  Pain management: adequate    Airway patency: patent  Anesthetic complications: No anesthetic complications  PONV Status: none  Cardiovascular status: acceptable  Respiratory status: acceptable  Hydration status: acceptable  No anesthesia care post op

## 2023-02-09 NOTE — OP NOTE
Surgeon:  Yue Jones MD  Preoperative Diagnosis: Screening for bariatric surgery    Postoperative Diagnosis: normal    Procedure Performed: Esophagogastroduodenoscopy with biopsy of the antrum to check for H. pylori    Indications: The patient is interested in bariatric surgery for weight loss.  This is a screening EGD.      Specimen: biopsy of gastric antrum for H. Pylori    EBL: none    Procedure:     The procedure, indications, preparation and potential complications were explained to the patient, who indicated understanding and signed the corresponding consent forms.  The patient was identified, taken to the endoscopy suite, and placed on the left side down decubitus position.  The patient underwent a MAC anesthesia and was appropriately monitored through the case by the anesthesia personnel with continuous pulse oximetry, blood pressure, and cardiac monitoring.  A bite block was placed.  After adequate IV sedation and using a transoral technique a lubed flexible endoscope was placed in the hypopharynx and advanced to the second portion of the duodenum without difficulty. The scope was then withdrawn back into the antrum of the stomach.  Cold forcep biopsies of the antrum were taken to rule out Helicobacter pylori.  The scope was retroflexed noting the body, fundus and cardia.  The scope was then withdrawn back into the esophagus after decompressing the stomach.  The Z line was noted and GE junction measured from the incisors.  The scope was then completely withdrawn.  The patient tolerated the procedure well and left the endoscopy suite in stable condition.  The findings are listed below.      normal

## 2023-02-10 LAB
LAB AP CASE REPORT: NORMAL
PATH REPORT.FINAL DX SPEC: NORMAL
PATH REPORT.GROSS SPEC: NORMAL

## 2023-02-22 ENCOUNTER — TELEPHONE (OUTPATIENT)
Dept: BARIATRICS/WEIGHT MGMT | Facility: CLINIC | Age: 63
End: 2023-02-22
Payer: COMMERCIAL

## 2023-02-27 ENCOUNTER — LAB (OUTPATIENT)
Dept: LAB | Facility: HOSPITAL | Age: 63
End: 2023-02-27
Payer: COMMERCIAL

## 2023-02-27 DIAGNOSIS — E66.9 OBESITY, CLASS II, BMI 35-39.9: ICD-10-CM

## 2023-02-27 LAB
25(OH)D3 SERPL-MCNC: 47.7 NG/ML (ref 30–100)
ALBUMIN SERPL-MCNC: 4.5 G/DL (ref 3.5–5.2)
ALBUMIN/GLOB SERPL: 1.2 G/DL
ALP SERPL-CCNC: 71 U/L (ref 39–117)
ALT SERPL W P-5'-P-CCNC: 16 U/L (ref 1–33)
AMPHET+METHAMPHET UR QL: NEGATIVE
ANION GAP SERPL CALCULATED.3IONS-SCNC: 8 MMOL/L (ref 5–15)
AST SERPL-CCNC: 15 U/L (ref 1–32)
BARBITURATES UR QL SCN: NEGATIVE
BASOPHILS # BLD AUTO: 0.04 10*3/MM3 (ref 0–0.2)
BASOPHILS NFR BLD AUTO: 0.4 % (ref 0–1.5)
BENZODIAZ UR QL SCN: NEGATIVE
BILIRUB SERPL-MCNC: 0.3 MG/DL (ref 0–1.2)
BUN SERPL-MCNC: 15 MG/DL (ref 8–23)
BUN/CREAT SERPL: 16.5 (ref 7–25)
CALCIUM SPEC-SCNC: 10.2 MG/DL (ref 8.6–10.5)
CANNABINOIDS SERPL QL: NEGATIVE
CHLORIDE SERPL-SCNC: 102 MMOL/L (ref 98–107)
CO2 SERPL-SCNC: 28 MMOL/L (ref 22–29)
COCAINE UR QL: NEGATIVE
CREAT SERPL-MCNC: 0.91 MG/DL (ref 0.57–1)
DEPRECATED RDW RBC AUTO: 39.3 FL (ref 37–54)
EGFRCR SERPLBLD CKD-EPI 2021: 70.6 ML/MIN/1.73
EOSINOPHIL # BLD AUTO: 0.12 10*3/MM3 (ref 0–0.4)
EOSINOPHIL NFR BLD AUTO: 1.3 % (ref 0.3–6.2)
ERYTHROCYTE [DISTWIDTH] IN BLOOD BY AUTOMATED COUNT: 12.6 % (ref 12.3–15.4)
GLOBULIN UR ELPH-MCNC: 3.7 GM/DL
GLUCOSE SERPL-MCNC: 102 MG/DL (ref 65–99)
HBA1C MFR BLD: 5.4 % (ref 3.5–5.6)
HCT VFR BLD AUTO: 41 % (ref 34–46.6)
HGB BLD-MCNC: 13.8 G/DL (ref 12–15.9)
IMM GRANULOCYTES # BLD AUTO: 0.03 10*3/MM3 (ref 0–0.05)
IMM GRANULOCYTES NFR BLD AUTO: 0.3 % (ref 0–0.5)
LYMPHOCYTES # BLD AUTO: 3.17 10*3/MM3 (ref 0.7–3.1)
LYMPHOCYTES NFR BLD AUTO: 35.5 % (ref 19.6–45.3)
MCH RBC QN AUTO: 29.3 PG (ref 26.6–33)
MCHC RBC AUTO-ENTMCNC: 33.7 G/DL (ref 31.5–35.7)
MCV RBC AUTO: 87 FL (ref 79–97)
METHADONE UR QL SCN: NEGATIVE
MONOCYTES # BLD AUTO: 0.61 10*3/MM3 (ref 0.1–0.9)
MONOCYTES NFR BLD AUTO: 6.8 % (ref 5–12)
NEUTROPHILS NFR BLD AUTO: 4.96 10*3/MM3 (ref 1.7–7)
NEUTROPHILS NFR BLD AUTO: 55.7 % (ref 42.7–76)
NRBC BLD AUTO-RTO: 0 /100 WBC (ref 0–0.2)
OPIATES UR QL: NEGATIVE
OXYCODONE UR QL SCN: NEGATIVE
PLATELET # BLD AUTO: 337 10*3/MM3 (ref 140–450)
PMV BLD AUTO: 11.3 FL (ref 6–12)
POTASSIUM SERPL-SCNC: 4.5 MMOL/L (ref 3.5–5.2)
PROT SERPL-MCNC: 8.2 G/DL (ref 6–8.5)
RBC # BLD AUTO: 4.71 10*6/MM3 (ref 3.77–5.28)
SODIUM SERPL-SCNC: 138 MMOL/L (ref 136–145)
TSH SERPL DL<=0.05 MIU/L-ACNC: 2.75 UIU/ML (ref 0.27–4.2)
WBC NRBC COR # BLD: 8.93 10*3/MM3 (ref 3.4–10.8)

## 2023-02-27 PROCEDURE — 80307 DRUG TEST PRSMV CHEM ANLYZR: CPT

## 2023-02-27 PROCEDURE — 83036 HEMOGLOBIN GLYCOSYLATED A1C: CPT

## 2023-02-27 PROCEDURE — 80050 GENERAL HEALTH PANEL: CPT

## 2023-02-27 PROCEDURE — 82306 VITAMIN D 25 HYDROXY: CPT

## 2023-02-27 PROCEDURE — 36415 COLL VENOUS BLD VENIPUNCTURE: CPT

## 2023-02-28 LAB
COTININE UR QL SCN: NEGATIVE NG/ML
Lab: NORMAL

## 2023-03-01 ENCOUNTER — PREP FOR SURGERY (OUTPATIENT)
Dept: OTHER | Facility: HOSPITAL | Age: 63
End: 2023-03-01
Payer: COMMERCIAL

## 2023-03-01 DIAGNOSIS — E66.9 OBESITY, CLASS II, BMI 35-39.9: Primary | ICD-10-CM

## 2023-03-01 PROBLEM — E66.01 OBESITY, CLASS III, BMI 40-49.9 (MORBID OBESITY): Status: ACTIVE | Noted: 2023-03-01

## 2023-03-01 PROBLEM — E66.813 OBESITY, CLASS III, BMI 40-49.9 (MORBID OBESITY): Status: ACTIVE | Noted: 2023-03-01

## 2023-03-01 RX ORDER — SODIUM CHLORIDE 0.9 % (FLUSH) 0.9 %
3 SYRINGE (ML) INJECTION EVERY 12 HOURS SCHEDULED
Status: CANCELLED | OUTPATIENT
Start: 2023-03-01

## 2023-03-01 RX ORDER — SODIUM CHLORIDE 0.9 % (FLUSH) 0.9 %
3-10 SYRINGE (ML) INJECTION AS NEEDED
Status: CANCELLED | OUTPATIENT
Start: 2023-03-01

## 2023-03-01 RX ORDER — SCOLOPAMINE TRANSDERMAL SYSTEM 1 MG/1
1 PATCH, EXTENDED RELEASE TRANSDERMAL ONCE
Status: CANCELLED | OUTPATIENT
Start: 2023-03-01 | End: 2023-03-01

## 2023-03-01 RX ORDER — SODIUM CHLORIDE 9 MG/ML
40 INJECTION, SOLUTION INTRAVENOUS AS NEEDED
Status: CANCELLED | OUTPATIENT
Start: 2023-03-01

## 2023-03-01 RX ORDER — CHLORHEXIDINE GLUCONATE 0.12 MG/ML
15 RINSE ORAL SEE ADMIN INSTRUCTIONS
Status: CANCELLED | OUTPATIENT
Start: 2023-03-01

## 2023-03-01 RX ORDER — PANTOPRAZOLE SODIUM 40 MG/10ML
40 INJECTION, POWDER, LYOPHILIZED, FOR SOLUTION INTRAVENOUS ONCE
Status: CANCELLED | OUTPATIENT
Start: 2023-03-01 | End: 2023-03-01

## 2023-03-01 RX ORDER — METOCLOPRAMIDE HYDROCHLORIDE 5 MG/ML
10 INJECTION INTRAMUSCULAR; INTRAVENOUS ONCE
Status: CANCELLED | OUTPATIENT
Start: 2023-03-01 | End: 2023-03-01

## 2023-03-01 RX ORDER — SODIUM CHLORIDE, SODIUM LACTATE, POTASSIUM CHLORIDE, CALCIUM CHLORIDE 600; 310; 30; 20 MG/100ML; MG/100ML; MG/100ML; MG/100ML
100 INJECTION, SOLUTION INTRAVENOUS CONTINUOUS
Status: CANCELLED | OUTPATIENT
Start: 2023-03-01

## 2023-03-06 ENCOUNTER — TELEPHONE (OUTPATIENT)
Dept: BARIATRICS/WEIGHT MGMT | Facility: CLINIC | Age: 63
End: 2023-03-06
Payer: COMMERCIAL

## 2023-03-06 RX ORDER — MULTIPLE VITAMINS W/ MINERALS TAB 9MG-400MCG
1 TAB ORAL DAILY
COMMUNITY
End: 2023-03-30

## 2023-03-07 ENCOUNTER — LAB (OUTPATIENT)
Dept: LAB | Facility: HOSPITAL | Age: 63
End: 2023-03-07
Payer: COMMERCIAL

## 2023-03-07 ENCOUNTER — HOSPITAL ENCOUNTER (OUTPATIENT)
Dept: CARDIOLOGY | Facility: HOSPITAL | Age: 63
Discharge: HOME | End: 2023-03-07
Payer: COMMERCIAL

## 2023-03-07 DIAGNOSIS — E66.9 OBESITY, CLASS II, BMI 35-39.9: ICD-10-CM

## 2023-03-07 LAB
ABO GROUP BLD: NORMAL
ANION GAP SERPL CALCULATED.3IONS-SCNC: 6.6 MMOL/L (ref 5–15)
BLD GP AB SCN SERPL QL: NEGATIVE
BUN SERPL-MCNC: 11 MG/DL (ref 8–23)
BUN/CREAT SERPL: 11.7 (ref 7–25)
CALCIUM SPEC-SCNC: 10 MG/DL (ref 8.6–10.5)
CHLORIDE SERPL-SCNC: 105 MMOL/L (ref 98–107)
CO2 SERPL-SCNC: 26.4 MMOL/L (ref 22–29)
CREAT SERPL-MCNC: 0.94 MG/DL (ref 0.57–1)
EGFRCR SERPLBLD CKD-EPI 2021: 67.9 ML/MIN/1.73
GLUCOSE SERPL-MCNC: 100 MG/DL (ref 65–99)
MRSA DNA SPEC QL NAA+PROBE: NORMAL
POTASSIUM SERPL-SCNC: 4.9 MMOL/L (ref 3.5–5.2)
RH BLD: NEGATIVE
SODIUM SERPL-SCNC: 138 MMOL/L (ref 136–145)
T&S EXPIRATION DATE: NORMAL

## 2023-03-07 PROCEDURE — 86901 BLOOD TYPING SEROLOGIC RH(D): CPT

## 2023-03-07 PROCEDURE — 86900 BLOOD TYPING SEROLOGIC ABO: CPT

## 2023-03-07 PROCEDURE — 87641 MR-STAPH DNA AMP PROBE: CPT

## 2023-03-07 PROCEDURE — 93010 ELECTROCARDIOGRAM REPORT: CPT | Performed by: INTERNAL MEDICINE

## 2023-03-07 PROCEDURE — 86850 RBC ANTIBODY SCREEN: CPT

## 2023-03-07 PROCEDURE — 93005 ELECTROCARDIOGRAM TRACING: CPT | Performed by: SURGERY

## 2023-03-07 PROCEDURE — 36415 COLL VENOUS BLD VENIPUNCTURE: CPT

## 2023-03-07 PROCEDURE — 80048 BASIC METABOLIC PNL TOTAL CA: CPT

## 2023-03-10 ENCOUNTER — OFFICE VISIT (OUTPATIENT)
Dept: BARIATRICS/WEIGHT MGMT | Facility: CLINIC | Age: 63
End: 2023-03-10
Payer: COMMERCIAL

## 2023-03-10 VITALS
HEIGHT: 67 IN | SYSTOLIC BLOOD PRESSURE: 118 MMHG | OXYGEN SATURATION: 98 % | BODY MASS INDEX: 38.48 KG/M2 | HEART RATE: 59 BPM | DIASTOLIC BLOOD PRESSURE: 74 MMHG | WEIGHT: 245.2 LBS | RESPIRATION RATE: 18 BRPM

## 2023-03-10 DIAGNOSIS — E66.9 OBESITY, CLASS II, BMI 35-39.9: Primary | ICD-10-CM

## 2023-03-10 LAB — QT INTERVAL: 421 MS

## 2023-03-10 PROCEDURE — 99215 OFFICE O/P EST HI 40 MIN: CPT | Performed by: SURGERY

## 2023-03-10 NOTE — PROGRESS NOTES
Bariatric Consult:    Chief Complaint: Morbid Obesity  Referred by Miryam Martinez MD    Tirso Palomo is here today for consult on Morbid Obesity    History of Present Illness:     Tirso Palomo is a 62 y.o. female with morbid obesity with co-morbidities including  has a past medical history of Allergic, Arthritis, Elevated cholesterol, Hypertension, Hypothyroidism, Insomnia, and Sciatica.  who presents for surgical consultation for the above procedure. Tirso has completed the initial intake visit and has been examined by our nurse practitioner, dietician, psychologist and underwent the extensive educational teaching process under the guidance of our bariatric coordinator and myself. Tirso also has seen the educational video RACHAEL on the surgical procedure if available. Tirso attended today more educational teaching from our bariatric coordinator and myself. Tirso has had an extensive medical workup including a visit with their primary care physician, EKG, chest radiograph, blood work, EGD or UGI and possibly further testing. These have been reviewed by me and discussed with the patient. Tirso is now ready to proceed with surgery. Tirso presently denies nausea, vomiting, fever, chills, chest pain, shortness of air, melena, hematochezia, hemetemesis, dysuria, frequency, hematuria, jaundice or abdominal pain.     Wt Readings from Last 10 Encounters:   03/10/23 111 kg (245 lb 3.2 oz)   02/09/23 111 kg (244 lb 11.4 oz)   02/02/23 113 kg (248 lb 12.8 oz)   11/23/22 109 kg (241 lb)   11/07/22 109 kg (241 lb 3.2 oz)   10/24/22 105 kg (231 lb)   06/02/22 102 kg (225 lb)   04/13/21 116 kg (256 lb 12.8 oz)       The  pre-op EGD shows   normal, and does not take a PPI and does not have symptoms    Past Medical History:   Diagnosis Date   • Allergic    • Arthritis    • Elevated cholesterol    • Hypertension    • Hypothyroidism    • Insomnia    • Sciatica        No diagnosis found.    Past Surgical History:    Procedure Laterality Date   • BREAST SURGERY Right 2012    biopsy   • COLONOSCOPY  2018   • ENDOSCOPY N/A 02/09/2023    Procedure: ESOPHAGOGASTRODUODENOSCOPY with gastric biopsy;  Surgeon: Yue Jones MD;  Location: [facility] ENDOSCOPY;  Service: General;  Laterality: N/A;  normal   • TOENAIL EXCISION      01/2022   • TUBAL ABDOMINAL LIGATION  1993       Patient Active Problem List   Diagnosis   • Chronic fatigue   • Essential hypertension   • Snoring   • Hemorrhoids   • Multiple joint pain   • Acquired hypothyroidism   • Screening mammogram, encounter for   • Screening for cervical cancer   • Screening for colon cancer   • Screening for hyperlipidemia   • Encounter for general adult medical examination with abnormal findings   • Vitamin D deficiency   • Urinary incontinence   • Stress incontinence of urine   • Spondylosis of lumbosacral spine without myelopathy   • Class 2 severe obesity due to excess calories with serious comorbidity and body mass index (BMI) of 37.0 to 37.9 in adult (Formerly [facility])   • Menopause present   • Hypermobility of urethra   • History of colonic polyps   • Female bladder prolapse   • Hormone replacement therapy   • Abscess of right breast   • Normal pelvic exam   • Epidermal inclusion cyst   • Obesity, Class II, BMI 35-39.9   • Obesity, Class III, BMI 40-49.9 (morbid obesity) ([facility])       Allergies   Allergen Reactions   • Lisinopril Cough   • Topamax [Topiramate] GI Intolerance   • Amlodipine Palpitations         Current Outpatient Medications:   •  Apoaequorin (Prevagen) 10 MG capsule, Take 1 capsule by mouth Daily., Disp: , Rfl:   •  estradiol (ESTRACE) 0.5 MG tablet, As needed, Disp: , Rfl:   •  levothyroxine (SYNTHROID, LEVOTHROID) 75 MCG tablet, Take 1 tablet by mouth Daily., Disp: 30 tablet, Rfl: 3  •  Lidocaine-Menthol (CBD4 FREEZE PUMP MAXIMUM STR EX), Apply  topically., Disp: , Rfl:   •  Lidocaine-Menthol (CBD4 Freeze Pump Maximum Str) 4-1 % cream, Apply  topically., Disp: , Rfl:   •   metoprolol succinate XL (TOPROL-XL) 50 MG 24 hr tablet, Take 1 tablet by mouth Daily., Disp: 90 tablet, Rfl: 3  •  Minoxidil 5 % foam, Apply  topically., Disp: , Rfl:   •  Misc Natural Products (COLON CLEANSER PO), Take  by mouth. HOLD 5-7  days before surgery, Disp: , Rfl:   •  spironolactone (ALDACTONE) 100 MG tablet, Take 1 tablet by mouth 2 (Two) Times a Day. HOLD DOS, Disp: , Rfl:   •  cholecalciferol (VITAMIN D3) 1000 units tablet, Take 1 tablet by mouth Daily., Disp: , Rfl:   •  multivitamin with minerals tablet tablet, Take 1 tablet by mouth Daily., Disp: , Rfl:   •  multivitamin with minerals tablet tablet, Take 1 tablet by mouth Daily., Disp: , Rfl:   •  Omega-3 Fatty Acids (fish oil) 1200 MG capsule capsule, Take 1 capsule by mouth Daily. HOLD 5-7 days for surgery, Disp: , Rfl:   •  Probiotic Product (PROBIOTIC-10 PO), Take 1 tablet by mouth Daily., Disp: , Rfl:   •  vitamin B-12 (CYANOCOBALAMIN) 1000 MCG tablet, Take 1 tablet by mouth Daily., Disp: , Rfl:     Social History     Socioeconomic History   • Marital status:    Tobacco Use   • Smoking status: Former     Packs/day: 0.25     Types: Cigarettes     Start date: 1990     Quit date: 2010     Years since quittin.6     Passive exposure: Past   • Smokeless tobacco: Never   • Tobacco comments:     Quit cold turkey   Vaping Use   • Vaping Use: Never used   Substance and Sexual Activity   • Alcohol use: No   • Drug use: No   • Sexual activity: Yes     Partners: Male     Birth control/protection: Post-menopausal       Family History   Problem Relation Age of Onset   • Cancer Mother         breast   • Hypertension Father    • Heart attack Father    • Cancer Father         prostate   • Obesity Brother    • Hypertension Brother    • Arthritis Brother    • Breast cancer Maternal Aunt    • Heart disease Maternal Grandmother    • Diabetes Maternal Grandmother    • Obesity Maternal Grandfather    • Stroke Maternal Grandfather    • Hypertension  Maternal Grandfather    • Cancer Paternal Grandmother    • Stroke Paternal Grandfather    • Heart attack Paternal Grandfather    • Hypertension Paternal Grandfather        Review of Systems:  Review of Systems    Review of Systems   Constitutional: Negative.    HENT: Negative.    Eyes: Negative.    Respiratory: Negative.    Cardiovascular: Negative.    Gastrointestinal: Negative.    Endocrine: Negative.    Genitourinary: Negative.    Musculoskeletal: Negative.    Skin: Negative.    Allergic/Immunologic: Negative.    Neurological: Negative.    Hematological: Negative.    Psychiatric/Behavioral: Negative.      Physical Exam:  Body mass index is 38.98 kg/m².   Vital Signs:  Weight: 111 kg (245 lb 3.2 oz)   Body mass index is 38.98 kg/m².      Heart Rate: 59   BP: 118/74     Awake and alert  Normal mental status  Normal pulmonary effort  Abdomen appropriate tenderness  Incisions no erythema  Extremities no tenderness or swelling      Assessment:    Tirso Palomo is a 62 y.o. year old female with medically complicated severe obesity with a BMI of Body mass index is 38.98 kg/m². and multiple co-morbidities listed in the encounter diagnosis.    I think she is an appropriate candidate for this surgery, and is ready to proceed.      The patient has returned to the office for a surgical consultation and has requested to proceed with a laparoscopic gastric sleeve.  I have had the opportunity to obtain a history, examine the patient and review the patient's chart.    The patient understands that surgery is a tool and that weight loss is not guaranteed but only seen in the context of appropriate use, regular follow up, exercise and making appropriate food choices.     I personally discussed the potential complications of the laparoscopic gastric sleeve with this patient.  The patient is well aware of potential complications of the surgery that include but not limited to bleeding, infections, deep vein thrombosis, pulmonary  embolism, pulmonary complications such as pneumonia, cardiac event, hernias, small bowel obstruction, damage to the spleen or other organs, bowel injury, disfiguring scars, failure to lose weight, need for additional surgery, conversion to an open procedure and death.  The patient is also aware of complications which apply in particular to the gastric sleeve and can include but not limited to the leakage of gastric contents at the staple line, the development of an intra-abdominal abscess, gastroesophageal reflux disease, Dick's esophagus, ulcers, vitamin/mineral deficiencies, strictures, and the possibility of converting this procedure to a Delma-en-Y gastric bypass. The patient also understands the possibility of requiring an acid reducer medication for the rest of their life.    The risks, benefits, potential complications and alternative therapies were discussed at great length as outlined in our extensive consent forms, online consent and educational teaching processes.    The patient has confirmed the participation in the programs extensive educational activities.    All questions and concerns were answered to patient's satisfaction.  The patient now wishes to proceed with surgery.    Patient has [default value] the pre-operative insertion of an IVC filter.     The patient has [default value] a postoperative course of anitcoagulant therapy.      Plan/Discussion/Summary:        I instructed patient to start on a H2 blocker or proton pump inhibitor if not already on one of these medications.    I explained in detail the procedures that we perform.  All of these procedures have a chance to convert to open if any technical challenges or complications do occur.  Bariatric surgery is not cosmetic surgery but rather a tool to help a patient make a life-long commitment lifestyle change including diet, exercise, behavior changes, and taking supplemental vitamins and minerals.    Problems after surgery may require  more operations to correct them.    The risks, benefits, alternatives, and potential complications of all of the procedures were explained in detail including, but not limited to death, anesthesia and medication adverse effect, deep venous thrombosis, pulmonary embolism, trocar site/incisional hernia, wound infection, abdominal infection, bleeding, failure to lose weight, gain weight, a change in body image, metabolic complications with vitamin deficiences and anemia.    Weight loss expectations were discussed with the patient in detail. The weight loss operations most commonly performed are the sleeve gastrectomy and the Delma-en-Y gastric bypass. These operations result in weight losses up to approximately 25-35% of initial body weight 12 to 24 months after surgery with the gastric bypass usually the higher percent of weight loss but depends on patient using the tool.    For the gastric bypass and loop duodenal switch (KEON-S) the risks include but not limited to the following early complications:  Anastomotic leak/peritonitis, Delma/Alimentary/biliopancreatic limb obstruction, severe & minor wound infection/seroma, and nausea/vomiting.  Late complications can include but are not limited to malnutrition, vitamin deficiencies, frequent loose stools,  stomal stenosis, marginal ulcer, bowel obstruction, intussusception, internal, and incisional hernia.    Regarding the gastric sleeve, there is less long-term outcome data and higher risk of dysphagia and reflux compared to a gastric bypass, as well as risk of internal visceral/organ injury, splenectomy, bleeding, infection, leak (which could require further intervention possible conversion to Delma-en-Y gastric bypass), stenosis and possibility of regaining weight.    Tirso was counseled regarding diagnostic results, instructions for management, risk factor reductions, prognosis, patient and family education, impressions, risks and benefits of treatment options and  importance of compliance with treatment. Total face to face time of the encounter was over 45 minutes and over 30 minutes was spent counseling.     Luda Report   As part of this patient's treatment plan I am prescribing controlled substances. The patient has been made aware of appropriate use of such medications, including potential risk of somnolence, limited ability to drive and /or work safely, and potential for dependence or overdose. It has also been made clear that these medications are for use by this patient only, without concomitant use of alcohol or other substances unless prescribed.    Tirso has completed prescribing agreement detailing terms of continued prescribing of controlled substances, including monitoring LUDA reports, urine drug screening, and pill counts if necessary. Tirso is aware that inappropriate use will result in cessation of prescribing such medications.    LUDA report has been reviewed      History and physical exam exhibit continued safe and appropriate use of controlled substances.      Tirso understands the surgical procedures and the different surgical options that are available.  She understands the lifestyle changes that are required after surgery and has agreed to follow the guidelines outlined in the weight management program.  She also expressed understanding of the risks involved and had all of female questions answered and desires to proceed.      Yue Jones MD  3/10/2023

## 2023-03-10 NOTE — H&P (VIEW-ONLY)
Bariatric Consult:    Chief Complaint: Morbid Obesity  Referred by Miryam Martinez MD    Tirso Palomo is here today for consult on Morbid Obesity    History of Present Illness:     Tirso Palomo is a 62 y.o. female with morbid obesity with co-morbidities including  has a past medical history of Allergic, Arthritis, Elevated cholesterol, Hypertension, Hypothyroidism, Insomnia, and Sciatica.  who presents for surgical consultation for the above procedure. Tirso has completed the initial intake visit and has been examined by our nurse practitioner, dietician, psychologist and underwent the extensive educational teaching process under the guidance of our bariatric coordinator and myself. Tirso also has seen the educational video RACHAEL on the surgical procedure if available. Tirso attended today more educational teaching from our bariatric coordinator and myself. Tirso has had an extensive medical workup including a visit with their primary care physician, EKG, chest radiograph, blood work, EGD or UGI and possibly further testing. These have been reviewed by me and discussed with the patient. Tirso is now ready to proceed with surgery. Tirso presently denies nausea, vomiting, fever, chills, chest pain, shortness of air, melena, hematochezia, hemetemesis, dysuria, frequency, hematuria, jaundice or abdominal pain.     Wt Readings from Last 10 Encounters:   03/10/23 111 kg (245 lb 3.2 oz)   02/09/23 111 kg (244 lb 11.4 oz)   02/02/23 113 kg (248 lb 12.8 oz)   11/23/22 109 kg (241 lb)   11/07/22 109 kg (241 lb 3.2 oz)   10/24/22 105 kg (231 lb)   06/02/22 102 kg (225 lb)   04/13/21 116 kg (256 lb 12.8 oz)       The  pre-op EGD shows   normal, and does not take a PPI and does not have symptoms    Past Medical History:   Diagnosis Date   • Allergic    • Arthritis    • Elevated cholesterol    • Hypertension    • Hypothyroidism    • Insomnia    • Sciatica        No diagnosis found.    Past Surgical History:    Procedure Laterality Date   • BREAST SURGERY Right 2012    biopsy   • COLONOSCOPY  2018   • ENDOSCOPY N/A 02/09/2023    Procedure: ESOPHAGOGASTRODUODENOSCOPY with gastric biopsy;  Surgeon: Yue Jones MD;  [facility];  Service: General;  Laterality: N/A;  normal   • TOENAIL EXCISION      01/2022   • TUBAL ABDOMINAL LIGATION  1993       Patient Active Problem List   Diagnosis   • Chronic fatigue   • Essential hypertension   • Snoring   • Hemorrhoids   • Multiple joint pain   • Acquired hypothyroidism   • Screening mammogram, encounter for   • Screening for cervical cancer   • Screening for colon cancer   • Screening for hyperlipidemia   • Encounter for general adult medical examination with abnormal findings   • Vitamin D deficiency   • Urinary incontinence   • Stress incontinence of urine   • Spondylosis of lumbosacral spine without myelopathy   • Class 2 severe obesity due to excess calories with serious comorbidity and body mass index (BMI) of 37.0 to 37.9 in adult (Formerly [facility])   • Menopause present   • Hypermobility of urethra   • History of colonic polyps   • Female bladder prolapse   • Hormone replacement therapy   • Abscess of right breast   • Normal pelvic exam   • Epidermal inclusion cyst   • Obesity, Class II, BMI 35-39.9   • Obesity, Class III, BMI 40-49.9 (morbid obesity) (Formerly [facility])       Allergies   Allergen Reactions   • Lisinopril Cough   • Topamax [Topiramate] GI Intolerance   • Amlodipine Palpitations         Current Outpatient Medications:   •  Apoaequorin (Prevagen) 10 MG capsule, Take 1 capsule by mouth Daily., Disp: , Rfl:   •  estradiol (ESTRACE) 0.5 MG tablet, As needed, Disp: , Rfl:   •  levothyroxine (SYNTHROID, LEVOTHROID) 75 MCG tablet, Take 1 tablet by mouth Daily., Disp: 30 tablet, Rfl: 3  •  Lidocaine-Menthol (CBD4 FREEZE PUMP MAXIMUM STR EX), Apply  topically., Disp: , Rfl:   •  Lidocaine-Menthol (CBD4 Freeze Pump Maximum Str) 4-1 % cream, Apply  topically., Disp: , Rfl:   •   metoprolol succinate XL (TOPROL-XL) 50 MG 24 hr tablet, Take 1 tablet by mouth Daily., Disp: 90 tablet, Rfl: 3  •  Minoxidil 5 % foam, Apply  topically., Disp: , Rfl:   •  Misc Natural Products (COLON CLEANSER PO), Take  by mouth. HOLD 5-7  days before surgery, Disp: , Rfl:   •  spironolactone (ALDACTONE) 100 MG tablet, Take 1 tablet by mouth 2 (Two) Times a Day. HOLD DOS, Disp: , Rfl:   •  cholecalciferol (VITAMIN D3) 1000 units tablet, Take 1 tablet by mouth Daily., Disp: , Rfl:   •  multivitamin with minerals tablet tablet, Take 1 tablet by mouth Daily., Disp: , Rfl:   •  multivitamin with minerals tablet tablet, Take 1 tablet by mouth Daily., Disp: , Rfl:   •  Omega-3 Fatty Acids (fish oil) 1200 MG capsule capsule, Take 1 capsule by mouth Daily. HOLD 5-7 days for surgery, Disp: , Rfl:   •  Probiotic Product (PROBIOTIC-10 PO), Take 1 tablet by mouth Daily., Disp: , Rfl:   •  vitamin B-12 (CYANOCOBALAMIN) 1000 MCG tablet, Take 1 tablet by mouth Daily., Disp: , Rfl:     Social History     Socioeconomic History   • Marital status:    Tobacco Use   • Smoking status: Former     Packs/day: 0.25     Types: Cigarettes     Start date: 1990     Quit date: 2010     Years since quittin.6     Passive exposure: Past   • Smokeless tobacco: Never   • Tobacco comments:     Quit cold turkey   Vaping Use   • Vaping Use: Never used   Substance and Sexual Activity   • Alcohol use: No   • Drug use: No   • Sexual activity: Yes     Partners: Male     Birth control/protection: Post-menopausal       Family History   Problem Relation Age of Onset   • Cancer Mother         breast   • Hypertension Father    • Heart attack Father    • Cancer Father         prostate   • Obesity Brother    • Hypertension Brother    • Arthritis Brother    • Breast cancer Maternal Aunt    • Heart disease Maternal Grandmother    • Diabetes Maternal Grandmother    • Obesity Maternal Grandfather    • Stroke Maternal Grandfather    • Hypertension  Maternal Grandfather    • Cancer Paternal Grandmother    • Stroke Paternal Grandfather    • Heart attack Paternal Grandfather    • Hypertension Paternal Grandfather        Review of Systems:  Review of Systems    Review of Systems   Constitutional: Negative.    HENT: Negative.    Eyes: Negative.    Respiratory: Negative.    Cardiovascular: Negative.    Gastrointestinal: Negative.    Endocrine: Negative.    Genitourinary: Negative.    Musculoskeletal: Negative.    Skin: Negative.    Allergic/Immunologic: Negative.    Neurological: Negative.    Hematological: Negative.    Psychiatric/Behavioral: Negative.      Physical Exam:  Body mass index is 38.98 kg/m².   Vital Signs:  Weight: 111 kg (245 lb 3.2 oz)   Body mass index is 38.98 kg/m².      Heart Rate: 59   BP: 118/74     Awake and alert  Normal mental status  Normal pulmonary effort  Abdomen appropriate tenderness  Incisions no erythema  Extremities no tenderness or swelling      Assessment:    Tirso Palomo is a 62 y.o. year old female with medically complicated severe obesity with a BMI of Body mass index is 38.98 kg/m². and multiple co-morbidities listed in the encounter diagnosis.    I think she is an appropriate candidate for this surgery, and is ready to proceed.      The patient has returned to the office for a surgical consultation and has requested to proceed with a laparoscopic gastric sleeve.  I have had the opportunity to obtain a history, examine the patient and review the patient's chart.    The patient understands that surgery is a tool and that weight loss is not guaranteed but only seen in the context of appropriate use, regular follow up, exercise and making appropriate food choices.     I personally discussed the potential complications of the laparoscopic gastric sleeve with this patient.  The patient is well aware of potential complications of the surgery that include but not limited to bleeding, infections, deep vein thrombosis, pulmonary  embolism, pulmonary complications such as pneumonia, cardiac event, hernias, small bowel obstruction, damage to the spleen or other organs, bowel injury, disfiguring scars, failure to lose weight, need for additional surgery, conversion to an open procedure and death.  The patient is also aware of complications which apply in particular to the gastric sleeve and can include but not limited to the leakage of gastric contents at the staple line, the development of an intra-abdominal abscess, gastroesophageal reflux disease, Dick's esophagus, ulcers, vitamin/mineral deficiencies, strictures, and the possibility of converting this procedure to a Delma-en-Y gastric bypass. The patient also understands the possibility of requiring an acid reducer medication for the rest of their life.    The risks, benefits, potential complications and alternative therapies were discussed at great length as outlined in our extensive consent forms, online consent and educational teaching processes.    The patient has confirmed the participation in the programs extensive educational activities.    All questions and concerns were answered to patient's satisfaction.  The patient now wishes to proceed with surgery.    Patient has [default value] the pre-operative insertion of an IVC filter.     The patient has [default value] a postoperative course of anitcoagulant therapy.      Plan/Discussion/Summary:        I instructed patient to start on a H2 blocker or proton pump inhibitor if not already on one of these medications.    I explained in detail the procedures that we perform.  All of these procedures have a chance to convert to open if any technical challenges or complications do occur.  Bariatric surgery is not cosmetic surgery but rather a tool to help a patient make a life-long commitment lifestyle change including diet, exercise, behavior changes, and taking supplemental vitamins and minerals.    Problems after surgery may require  more operations to correct them.    The risks, benefits, alternatives, and potential complications of all of the procedures were explained in detail including, but not limited to death, anesthesia and medication adverse effect, deep venous thrombosis, pulmonary embolism, trocar site/incisional hernia, wound infection, abdominal infection, bleeding, failure to lose weight, gain weight, a change in body image, metabolic complications with vitamin deficiences and anemia.    Weight loss expectations were discussed with the patient in detail. The weight loss operations most commonly performed are the sleeve gastrectomy and the Delma-en-Y gastric bypass. These operations result in weight losses up to approximately 25-35% of initial body weight 12 to 24 months after surgery with the gastric bypass usually the higher percent of weight loss but depends on patient using the tool.    For the gastric bypass and loop duodenal switch (KEON-S) the risks include but not limited to the following early complications:  Anastomotic leak/peritonitis, Delma/Alimentary/biliopancreatic limb obstruction, severe & minor wound infection/seroma, and nausea/vomiting.  Late complications can include but are not limited to malnutrition, vitamin deficiencies, frequent loose stools,  stomal stenosis, marginal ulcer, bowel obstruction, intussusception, internal, and incisional hernia.    Regarding the gastric sleeve, there is less long-term outcome data and higher risk of dysphagia and reflux compared to a gastric bypass, as well as risk of internal visceral/organ injury, splenectomy, bleeding, infection, leak (which could require further intervention possible conversion to Delma-en-Y gastric bypass), stenosis and possibility of regaining weight.    Tirso was counseled regarding diagnostic results, instructions for management, risk factor reductions, prognosis, patient and family education, impressions, risks and benefits of treatment options and  importance of compliance with treatment. Total face to face time of the encounter was over 45 minutes and over 30 minutes was spent counseling.     Luda Report   As part of this patient's treatment plan I am prescribing controlled substances. The patient has been made aware of appropriate use of such medications, including potential risk of somnolence, limited ability to drive and /or work safely, and potential for dependence or overdose. It has also been made clear that these medications are for use by this patient only, without concomitant use of alcohol or other substances unless prescribed.    Tirso has completed prescribing agreement detailing terms of continued prescribing of controlled substances, including monitoring LUDA reports, urine drug screening, and pill counts if necessary. Tirso is aware that inappropriate use will result in cessation of prescribing such medications.    LUDA report has been reviewed      History and physical exam exhibit continued safe and appropriate use of controlled substances.      Tirso understands the surgical procedures and the different surgical options that are available.  She understands the lifestyle changes that are required after surgery and has agreed to follow the guidelines outlined in the weight management program.  She also expressed understanding of the risks involved and had all of female questions answered and desires to proceed.      Yue Jones MD  3/10/2023

## 2023-03-13 ENCOUNTER — TELEPHONE (OUTPATIENT)
Dept: BARIATRICS/WEIGHT MGMT | Facility: CLINIC | Age: 63
End: 2023-03-13
Payer: COMMERCIAL

## 2023-03-13 NOTE — TELEPHONE ENCOUNTER
Spoke with patient and answered all questions regarding PPI before and after surgery and gatorade prior to surgery day of. Patient given PAT's number to clarify PPI when to stop pre Op. MN

## 2023-03-16 ENCOUNTER — HOSPITAL ENCOUNTER (INPATIENT)
Facility: HOSPITAL | Age: 63
LOS: 1 days | Discharge: HOME | DRG: 621 | End: 2023-03-17
Attending: SURGERY | Admitting: SURGERY
Payer: COMMERCIAL

## 2023-03-16 ENCOUNTER — ANESTHESIA EVENT (OUTPATIENT)
Dept: PERIOP | Facility: HOSPITAL | Age: 63
DRG: 621 | End: 2023-03-16
Payer: COMMERCIAL

## 2023-03-16 ENCOUNTER — ANESTHESIA (OUTPATIENT)
Dept: PERIOP | Facility: HOSPITAL | Age: 63
DRG: 621 | End: 2023-03-16
Payer: COMMERCIAL

## 2023-03-16 DIAGNOSIS — E66.9 OBESITY, CLASS II, BMI 35-39.9: ICD-10-CM

## 2023-03-16 DIAGNOSIS — G89.18 POST-OP PAIN: Primary | ICD-10-CM

## 2023-03-16 LAB
ALBUMIN SERPL-MCNC: 3.9 G/DL (ref 3.5–5.2)
ALBUMIN/GLOB SERPL: 1.6 G/DL
ALP SERPL-CCNC: 63 U/L (ref 39–117)
ALT SERPL W P-5'-P-CCNC: 17 U/L (ref 1–33)
ANION GAP SERPL CALCULATED.3IONS-SCNC: 6 MMOL/L (ref 5–15)
AST SERPL-CCNC: 29 U/L (ref 1–32)
BASOPHILS # BLD AUTO: 0 10*3/MM3 (ref 0–0.2)
BASOPHILS NFR BLD AUTO: 0.2 % (ref 0–1.5)
BILIRUB SERPL-MCNC: 0.3 MG/DL (ref 0–1.2)
BUN SERPL-MCNC: 12 MG/DL (ref 8–23)
BUN/CREAT SERPL: 16 (ref 7–25)
CALCIUM SPEC-SCNC: 9 MG/DL (ref 8.6–10.5)
CHLORIDE SERPL-SCNC: 104 MMOL/L (ref 98–107)
CO2 SERPL-SCNC: 28 MMOL/L (ref 22–29)
CREAT SERPL-MCNC: 0.75 MG/DL (ref 0.57–1)
DEPRECATED RDW RBC AUTO: 42.9 FL (ref 37–54)
EGFRCR SERPLBLD CKD-EPI 2021: 89 ML/MIN/1.73
EOSINOPHIL # BLD AUTO: 0 10*3/MM3 (ref 0–0.4)
EOSINOPHIL NFR BLD AUTO: 0.1 % (ref 0.3–6.2)
ERYTHROCYTE [DISTWIDTH] IN BLOOD BY AUTOMATED COUNT: 13.8 % (ref 12.3–15.4)
GLOBULIN UR ELPH-MCNC: 2.5 GM/DL
GLUCOSE SERPL-MCNC: 127 MG/DL (ref 65–99)
HCT VFR BLD AUTO: 40.1 % (ref 34–46.6)
HGB BLD-MCNC: 12.7 G/DL (ref 12–15.9)
LYMPHOCYTES # BLD AUTO: 1.7 10*3/MM3 (ref 0.7–3.1)
LYMPHOCYTES NFR BLD AUTO: 13.5 % (ref 19.6–45.3)
MAGNESIUM SERPL-MCNC: 1.9 MG/DL (ref 1.6–2.4)
MCH RBC QN AUTO: 28.5 PG (ref 26.6–33)
MCHC RBC AUTO-ENTMCNC: 31.6 G/DL (ref 31.5–35.7)
MCV RBC AUTO: 90 FL (ref 79–97)
MONOCYTES # BLD AUTO: 0.8 10*3/MM3 (ref 0.1–0.9)
MONOCYTES NFR BLD AUTO: 6.2 % (ref 5–12)
NEUTROPHILS NFR BLD AUTO: 80 % (ref 42.7–76)
NEUTROPHILS NFR BLD AUTO: 9.9 10*3/MM3 (ref 1.7–7)
NRBC BLD AUTO-RTO: 0 /100 WBC (ref 0–0.2)
PHOSPHATE SERPL-MCNC: 3.3 MG/DL (ref 2.5–4.5)
PLATELET # BLD AUTO: 291 10*3/MM3 (ref 140–450)
PMV BLD AUTO: 8.7 FL (ref 6–12)
POTASSIUM SERPL-SCNC: 5 MMOL/L (ref 3.5–5.2)
PROT SERPL-MCNC: 6.4 G/DL (ref 6–8.5)
RBC # BLD AUTO: 4.45 10*6/MM3 (ref 3.77–5.28)
SODIUM SERPL-SCNC: 138 MMOL/L (ref 136–145)
WBC NRBC COR # BLD: 12.3 10*3/MM3 (ref 3.4–10.8)

## 2023-03-16 PROCEDURE — 0BQT4ZZ REPAIR DIAPHRAGM, PERCUTANEOUS ENDOSCOPIC APPROACH: ICD-10-PCS | Performed by: SURGERY

## 2023-03-16 PROCEDURE — 25010000002 METOCLOPRAMIDE PER 10 MG: Performed by: SURGERY

## 2023-03-16 PROCEDURE — C9290 INJ, BUPIVACAINE LIPOSOME: HCPCS | Performed by: SURGERY

## 2023-03-16 PROCEDURE — 25010000002 CEFAZOLIN PER 500 MG: Performed by: SURGERY

## 2023-03-16 PROCEDURE — 25010000002 HYDROMORPHONE 1 MG/ML SOLUTION: Performed by: NURSE ANESTHETIST, CERTIFIED REGISTERED

## 2023-03-16 PROCEDURE — 88307 TISSUE EXAM BY PATHOLOGIST: CPT | Performed by: SURGERY

## 2023-03-16 PROCEDURE — 25010000002 PROPOFOL 200 MG/20ML EMULSION: Performed by: NURSE ANESTHETIST, CERTIFIED REGISTERED

## 2023-03-16 PROCEDURE — 0 BUPIVACAINE LIPOSOME 1.3 % SUSPENSION 10 ML VIAL: Performed by: SURGERY

## 2023-03-16 PROCEDURE — 83735 ASSAY OF MAGNESIUM: CPT | Performed by: SURGERY

## 2023-03-16 PROCEDURE — 25010000002 MIDAZOLAM PER 1 MG: Performed by: NURSE ANESTHETIST, CERTIFIED REGISTERED

## 2023-03-16 PROCEDURE — 43775 LAP SLEEVE GASTRECTOMY: CPT | Performed by: NURSE PRACTITIONER

## 2023-03-16 PROCEDURE — 43775 LAP SLEEVE GASTRECTOMY: CPT | Performed by: SURGERY

## 2023-03-16 PROCEDURE — 0DB64Z3 EXCISION OF STOMACH, PERCUTANEOUS ENDOSCOPIC APPROACH, VERTICAL: ICD-10-PCS | Performed by: SURGERY

## 2023-03-16 PROCEDURE — 25010000002 FENTANYL CITRATE (PF) 100 MCG/2ML SOLUTION: Performed by: NURSE ANESTHETIST, CERTIFIED REGISTERED

## 2023-03-16 PROCEDURE — 25010000002 PROPOFOL 10 MG/ML EMULSION: Performed by: NURSE ANESTHETIST, CERTIFIED REGISTERED

## 2023-03-16 PROCEDURE — 25010000002 SUCCINYLCHOLINE PER 20 MG: Performed by: NURSE ANESTHETIST, CERTIFIED REGISTERED

## 2023-03-16 PROCEDURE — 84100 ASSAY OF PHOSPHORUS: CPT | Performed by: SURGERY

## 2023-03-16 PROCEDURE — 8E0W4CZ ROBOTIC ASSISTED PROCEDURE OF TRUNK REGION, PERCUTANEOUS ENDOSCOPIC APPROACH: ICD-10-PCS | Performed by: SURGERY

## 2023-03-16 PROCEDURE — 25010000002 ONDANSETRON PER 1 MG: Performed by: NURSE ANESTHETIST, CERTIFIED REGISTERED

## 2023-03-16 PROCEDURE — 85025 COMPLETE CBC W/AUTO DIFF WBC: CPT | Performed by: SURGERY

## 2023-03-16 PROCEDURE — 80053 COMPREHEN METABOLIC PANEL: CPT | Performed by: SURGERY

## 2023-03-16 DEVICE — STAPLER 60 RELOAD BLUE
Type: IMPLANTABLE DEVICE | Site: ABDOMEN | Status: FUNCTIONAL
Brand: SUREFORM

## 2023-03-16 DEVICE — ABSORBABLE WOUND CLOSURE DEVICE
Type: IMPLANTABLE DEVICE | Site: ABDOMEN | Status: FUNCTIONAL
Brand: SYNETURE

## 2023-03-16 DEVICE — STAPLER 60 RELOAD WHITE
Type: IMPLANTABLE DEVICE | Site: ABDOMEN | Status: FUNCTIONAL
Brand: SUREFORM

## 2023-03-16 RX ORDER — OMEPRAZOLE 20 MG/1
20 CAPSULE, DELAYED RELEASE ORAL DAILY
COMMUNITY
End: 2023-03-30

## 2023-03-16 RX ORDER — DEXAMETHASONE SODIUM PHOSPHATE 4 MG/ML
8 INJECTION, SOLUTION INTRA-ARTICULAR; INTRALESIONAL; INTRAMUSCULAR; INTRAVENOUS; SOFT TISSUE ONCE AS NEEDED
Status: DISCONTINUED | OUTPATIENT
Start: 2023-03-16 | End: 2023-03-16 | Stop reason: HOSPADM

## 2023-03-16 RX ORDER — LEVOTHYROXINE SODIUM 0.07 MG/1
75 TABLET ORAL DAILY
Status: DISCONTINUED | OUTPATIENT
Start: 2023-03-17 | End: 2023-03-17 | Stop reason: HOSPADM

## 2023-03-16 RX ORDER — FENTANYL CITRATE 50 UG/ML
INJECTION, SOLUTION INTRAMUSCULAR; INTRAVENOUS AS NEEDED
Status: DISCONTINUED | OUTPATIENT
Start: 2023-03-16 | End: 2023-03-16 | Stop reason: SURG

## 2023-03-16 RX ORDER — METOCLOPRAMIDE HYDROCHLORIDE 5 MG/ML
10 INJECTION INTRAMUSCULAR; INTRAVENOUS ONCE
Status: COMPLETED | OUTPATIENT
Start: 2023-03-16 | End: 2023-03-16

## 2023-03-16 RX ORDER — ONDANSETRON 2 MG/ML
8 INJECTION INTRAMUSCULAR; INTRAVENOUS EVERY 6 HOURS PRN
Status: DISCONTINUED | OUTPATIENT
Start: 2023-03-16 | End: 2023-03-17 | Stop reason: HOSPADM

## 2023-03-16 RX ORDER — SODIUM CHLORIDE 0.9 % (FLUSH) 0.9 %
3-10 SYRINGE (ML) INJECTION AS NEEDED
Status: DISCONTINUED | OUTPATIENT
Start: 2023-03-16 | End: 2023-03-16 | Stop reason: HOSPADM

## 2023-03-16 RX ORDER — PROCHLORPERAZINE EDISYLATE 5 MG/ML
10 INJECTION INTRAMUSCULAR; INTRAVENOUS EVERY 6 HOURS PRN
Status: DISCONTINUED | OUTPATIENT
Start: 2023-03-16 | End: 2023-03-17 | Stop reason: HOSPADM

## 2023-03-16 RX ORDER — PROCHLORPERAZINE EDISYLATE 5 MG/ML
10 INJECTION INTRAMUSCULAR; INTRAVENOUS ONCE AS NEEDED
Status: DISCONTINUED | OUTPATIENT
Start: 2023-03-16 | End: 2023-03-16 | Stop reason: HOSPADM

## 2023-03-16 RX ORDER — KETAMINE HCL IN NACL, ISO-OSM 100MG/10ML
SYRINGE (ML) INJECTION AS NEEDED
Status: DISCONTINUED | OUTPATIENT
Start: 2023-03-16 | End: 2023-03-16 | Stop reason: SURG

## 2023-03-16 RX ORDER — SODIUM CHLORIDE, SODIUM LACTATE, POTASSIUM CHLORIDE, CALCIUM CHLORIDE 600; 310; 30; 20 MG/100ML; MG/100ML; MG/100ML; MG/100ML
150 INJECTION, SOLUTION INTRAVENOUS CONTINUOUS
Status: DISCONTINUED | OUTPATIENT
Start: 2023-03-16 | End: 2023-03-17 | Stop reason: HOSPADM

## 2023-03-16 RX ORDER — EPHEDRINE SULFATE 5 MG/ML
INJECTION INTRAVENOUS AS NEEDED
Status: DISCONTINUED | OUTPATIENT
Start: 2023-03-16 | End: 2023-03-16 | Stop reason: SURG

## 2023-03-16 RX ORDER — ACETAMINOPHEN 500 MG
1000 TABLET ORAL EVERY 6 HOURS
Status: DISCONTINUED | OUTPATIENT
Start: 2023-03-16 | End: 2023-03-17 | Stop reason: HOSPADM

## 2023-03-16 RX ORDER — ENOXAPARIN SODIUM 100 MG/ML
40 INJECTION SUBCUTANEOUS DAILY
Status: DISCONTINUED | OUTPATIENT
Start: 2023-03-17 | End: 2023-03-17 | Stop reason: HOSPADM

## 2023-03-16 RX ORDER — CYANOCOBALAMIN 1000 UG/ML
1000 INJECTION, SOLUTION INTRAMUSCULAR; SUBCUTANEOUS ONCE
Status: COMPLETED | OUTPATIENT
Start: 2023-03-17 | End: 2023-03-17

## 2023-03-16 RX ORDER — MIDAZOLAM HYDROCHLORIDE 1 MG/ML
INJECTION INTRAMUSCULAR; INTRAVENOUS AS NEEDED
Status: DISCONTINUED | OUTPATIENT
Start: 2023-03-16 | End: 2023-03-16 | Stop reason: SURG

## 2023-03-16 RX ORDER — METOCLOPRAMIDE HYDROCHLORIDE 5 MG/ML
10 INJECTION INTRAMUSCULAR; INTRAVENOUS EVERY 6 HOURS PRN
Status: DISCONTINUED | OUTPATIENT
Start: 2023-03-16 | End: 2023-03-17 | Stop reason: HOSPADM

## 2023-03-16 RX ORDER — PROPOFOL 10 MG/ML
INJECTION, EMULSION INTRAVENOUS AS NEEDED
Status: DISCONTINUED | OUTPATIENT
Start: 2023-03-16 | End: 2023-03-16 | Stop reason: SURG

## 2023-03-16 RX ORDER — LANSOPRAZOLE 30 MG/1
30 TABLET, ORALLY DISINTEGRATING, DELAYED RELEASE ORAL
Status: DISCONTINUED | OUTPATIENT
Start: 2023-03-16 | End: 2023-03-17 | Stop reason: HOSPADM

## 2023-03-16 RX ORDER — HYDROMORPHONE HYDROCHLORIDE 2 MG/1
2 TABLET ORAL EVERY 4 HOURS PRN
Status: DISCONTINUED | OUTPATIENT
Start: 2023-03-16 | End: 2023-03-17 | Stop reason: HOSPADM

## 2023-03-16 RX ORDER — SUCCINYLCHOLINE CHLORIDE 20 MG/ML
INJECTION INTRAMUSCULAR; INTRAVENOUS AS NEEDED
Status: DISCONTINUED | OUTPATIENT
Start: 2023-03-16 | End: 2023-03-16 | Stop reason: SURG

## 2023-03-16 RX ORDER — PANTOPRAZOLE SODIUM 40 MG/10ML
40 INJECTION, POWDER, LYOPHILIZED, FOR SOLUTION INTRAVENOUS ONCE
Status: COMPLETED | OUTPATIENT
Start: 2023-03-16 | End: 2023-03-16

## 2023-03-16 RX ORDER — ONDANSETRON 2 MG/ML
4 INJECTION INTRAMUSCULAR; INTRAVENOUS ONCE AS NEEDED
Status: DISCONTINUED | OUTPATIENT
Start: 2023-03-16 | End: 2023-03-16 | Stop reason: HOSPADM

## 2023-03-16 RX ORDER — ALBUTEROL SULFATE 2.5 MG/3ML
2.5 SOLUTION RESPIRATORY (INHALATION) EVERY 4 HOURS PRN
Status: DISCONTINUED | OUTPATIENT
Start: 2023-03-16 | End: 2023-03-17 | Stop reason: HOSPADM

## 2023-03-16 RX ORDER — NALOXONE HCL 0.4 MG/ML
0.1 VIAL (ML) INJECTION
Status: DISCONTINUED | OUTPATIENT
Start: 2023-03-16 | End: 2023-03-17 | Stop reason: HOSPADM

## 2023-03-16 RX ORDER — GLYCOPYRROLATE 0.2 MG/ML
INJECTION INTRAMUSCULAR; INTRAVENOUS AS NEEDED
Status: DISCONTINUED | OUTPATIENT
Start: 2023-03-16 | End: 2023-03-16 | Stop reason: SURG

## 2023-03-16 RX ORDER — ONDANSETRON 2 MG/ML
INJECTION INTRAMUSCULAR; INTRAVENOUS AS NEEDED
Status: DISCONTINUED | OUTPATIENT
Start: 2023-03-16 | End: 2023-03-16 | Stop reason: SURG

## 2023-03-16 RX ORDER — SODIUM CHLORIDE, SODIUM LACTATE, POTASSIUM CHLORIDE, CALCIUM CHLORIDE 600; 310; 30; 20 MG/100ML; MG/100ML; MG/100ML; MG/100ML
100 INJECTION, SOLUTION INTRAVENOUS CONTINUOUS
Status: DISCONTINUED | OUTPATIENT
Start: 2023-03-16 | End: 2023-03-16

## 2023-03-16 RX ORDER — CHLORHEXIDINE GLUCONATE 0.12 MG/ML
15 RINSE ORAL SEE ADMIN INSTRUCTIONS
Status: COMPLETED | OUTPATIENT
Start: 2023-03-16 | End: 2023-03-16

## 2023-03-16 RX ORDER — ROCURONIUM BROMIDE 10 MG/ML
INJECTION, SOLUTION INTRAVENOUS AS NEEDED
Status: DISCONTINUED | OUTPATIENT
Start: 2023-03-16 | End: 2023-03-16 | Stop reason: SURG

## 2023-03-16 RX ORDER — PROMETHAZINE HYDROCHLORIDE 25 MG/1
25 SUPPOSITORY RECTAL ONCE AS NEEDED
Status: DISCONTINUED | OUTPATIENT
Start: 2023-03-16 | End: 2023-03-16 | Stop reason: HOSPADM

## 2023-03-16 RX ORDER — ACETAMINOPHEN 160 MG/5ML
1000 SOLUTION ORAL EVERY 6 HOURS
Status: DISCONTINUED | OUTPATIENT
Start: 2023-03-16 | End: 2023-03-17 | Stop reason: HOSPADM

## 2023-03-16 RX ORDER — PROMETHAZINE HYDROCHLORIDE 25 MG/1
25 TABLET ORAL ONCE AS NEEDED
Status: DISCONTINUED | OUTPATIENT
Start: 2023-03-16 | End: 2023-03-16 | Stop reason: HOSPADM

## 2023-03-16 RX ORDER — SODIUM CHLORIDE 9 MG/ML
40 INJECTION, SOLUTION INTRAVENOUS AS NEEDED
Status: DISCONTINUED | OUTPATIENT
Start: 2023-03-16 | End: 2023-03-16 | Stop reason: HOSPADM

## 2023-03-16 RX ORDER — METOPROLOL SUCCINATE 50 MG/1
50 TABLET, EXTENDED RELEASE ORAL DAILY
Status: DISCONTINUED | OUTPATIENT
Start: 2023-03-16 | End: 2023-03-17 | Stop reason: HOSPADM

## 2023-03-16 RX ORDER — ONDANSETRON 4 MG/1
8 TABLET, FILM COATED ORAL EVERY 6 HOURS PRN
Status: DISCONTINUED | OUTPATIENT
Start: 2023-03-16 | End: 2023-03-17 | Stop reason: HOSPADM

## 2023-03-16 RX ORDER — HYDRALAZINE HYDROCHLORIDE 20 MG/ML
10 INJECTION INTRAMUSCULAR; INTRAVENOUS
Status: DISCONTINUED | OUTPATIENT
Start: 2023-03-16 | End: 2023-03-17 | Stop reason: HOSPADM

## 2023-03-16 RX ORDER — SCOLOPAMINE TRANSDERMAL SYSTEM 1 MG/1
1 PATCH, EXTENDED RELEASE TRANSDERMAL ONCE
Status: DISCONTINUED | OUTPATIENT
Start: 2023-03-16 | End: 2023-03-16

## 2023-03-16 RX ADMIN — SODIUM CHLORIDE, POTASSIUM CHLORIDE, SODIUM LACTATE AND CALCIUM CHLORIDE 100 ML/HR: 600; 310; 30; 20 INJECTION, SOLUTION INTRAVENOUS at 06:46

## 2023-03-16 RX ADMIN — HYOSCYAMINE SULFATE 125 MCG: 0.12 TABLET ORAL at 10:52

## 2023-03-16 RX ADMIN — PANTOPRAZOLE SODIUM 40 MG: 40 INJECTION, POWDER, LYOPHILIZED, FOR SOLUTION INTRAVENOUS at 06:47

## 2023-03-16 RX ADMIN — EPHEDRINE SULFATE 7.5 MG: 5 INJECTION INTRAVENOUS at 07:53

## 2023-03-16 RX ADMIN — MIDAZOLAM HYDROCHLORIDE 2 MG: 1 INJECTION, SOLUTION INTRAMUSCULAR; INTRAVENOUS at 07:33

## 2023-03-16 RX ADMIN — SCOPALAMINE 1 PATCH: 1 PATCH, EXTENDED RELEASE TRANSDERMAL at 06:39

## 2023-03-16 RX ADMIN — ACETAMINOPHEN 1000 MG: 500 TABLET, FILM COATED ORAL at 15:59

## 2023-03-16 RX ADMIN — FENTANYL CITRATE 50 MCG: 50 INJECTION, SOLUTION INTRAMUSCULAR; INTRAVENOUS at 07:51

## 2023-03-16 RX ADMIN — ROCURONIUM BROMIDE 5 MG: 10 INJECTION, SOLUTION INTRAVENOUS at 07:37

## 2023-03-16 RX ADMIN — SUCCINYLCHOLINE CHLORIDE 180 MG: 20 INJECTION, SOLUTION INTRAMUSCULAR; INTRAVENOUS at 07:37

## 2023-03-16 RX ADMIN — SODIUM CHLORIDE, POTASSIUM CHLORIDE, SODIUM LACTATE AND CALCIUM CHLORIDE 500 ML: 600; 310; 30; 20 INJECTION, SOLUTION INTRAVENOUS at 06:38

## 2023-03-16 RX ADMIN — FENTANYL CITRATE 75 MCG: 50 INJECTION, SOLUTION INTRAMUSCULAR; INTRAVENOUS at 08:35

## 2023-03-16 RX ADMIN — HYDROMORPHONE HYDROCHLORIDE 0.5 MG: 1 INJECTION, SOLUTION INTRAMUSCULAR; INTRAVENOUS; SUBCUTANEOUS at 09:33

## 2023-03-16 RX ADMIN — ONDANSETRON 4 MG: 2 INJECTION INTRAMUSCULAR; INTRAVENOUS at 08:35

## 2023-03-16 RX ADMIN — HYDROMORPHONE HYDROCHLORIDE 0.5 MG: 1 INJECTION, SOLUTION INTRAMUSCULAR; INTRAVENOUS; SUBCUTANEOUS at 09:14

## 2023-03-16 RX ADMIN — Medication 5 MG: at 07:52

## 2023-03-16 RX ADMIN — ACETAMINOPHEN 1000 MG: 500 TABLET, FILM COATED ORAL at 10:52

## 2023-03-16 RX ADMIN — GLYCOPYRROLATE 0.1 MG: 0.2 INJECTION INTRAMUSCULAR; INTRAVENOUS at 07:53

## 2023-03-16 RX ADMIN — Medication 7.5 MG: at 07:33

## 2023-03-16 RX ADMIN — PROPOFOL 180 MG: 10 INJECTION, EMULSION INTRAVENOUS at 07:37

## 2023-03-16 RX ADMIN — FENTANYL CITRATE 75 MCG: 50 INJECTION, SOLUTION INTRAMUSCULAR; INTRAVENOUS at 07:35

## 2023-03-16 RX ADMIN — Medication 10 MG: at 08:02

## 2023-03-16 RX ADMIN — PROPOFOL 100 MCG/KG/MIN: 10 INJECTION, EMULSION INTRAVENOUS at 07:39

## 2023-03-16 RX ADMIN — EPHEDRINE SULFATE 7.5 MG: 5 INJECTION INTRAVENOUS at 08:02

## 2023-03-16 RX ADMIN — EPHEDRINE SULFATE 5 MG: 5 INJECTION INTRAVENOUS at 08:12

## 2023-03-16 RX ADMIN — CHLORHEXIDINE GLUCONATE 15 ML: 1.2 SOLUTION ORAL at 06:39

## 2023-03-16 RX ADMIN — Medication 7.5 MG: at 07:42

## 2023-03-16 RX ADMIN — HYOSCYAMINE SULFATE 125 MCG: 0.12 TABLET ORAL at 16:58

## 2023-03-16 RX ADMIN — ROCURONIUM BROMIDE 45 MG: 10 INJECTION, SOLUTION INTRAVENOUS at 07:45

## 2023-03-16 RX ADMIN — ACETAMINOPHEN 1000 MG: 500 TABLET, FILM COATED ORAL at 20:24

## 2023-03-16 RX ADMIN — SUGAMMADEX 435 MG: 100 INJECTION, SOLUTION INTRAVENOUS at 08:40

## 2023-03-16 RX ADMIN — METOCLOPRAMIDE 10 MG: 5 INJECTION, SOLUTION INTRAMUSCULAR; INTRAVENOUS at 06:53

## 2023-03-16 RX ADMIN — SODIUM CHLORIDE, POTASSIUM CHLORIDE, SODIUM LACTATE AND CALCIUM CHLORIDE 150 ML/HR: 600; 310; 30; 20 INJECTION, SOLUTION INTRAVENOUS at 18:33

## 2023-03-16 RX ADMIN — CEFAZOLIN 2 G: 2 INJECTION, POWDER, FOR SOLUTION INTRAMUSCULAR; INTRAVENOUS at 07:28

## 2023-03-16 RX ADMIN — Medication 10 MG: at 07:47

## 2023-03-16 RX ADMIN — SODIUM CHLORIDE, POTASSIUM CHLORIDE, SODIUM LACTATE AND CALCIUM CHLORIDE 150 ML/HR: 600; 310; 30; 20 INJECTION, SOLUTION INTRAVENOUS at 10:53

## 2023-03-16 NOTE — ANESTHESIA POSTPROCEDURE EVALUATION
Patient: Tirso Palomo    Procedure Summary     Date: 03/16/23 Room / Location: [facility] OR 08 / [facility] MAIN OR    Anesthesia Start: 0728 Anesthesia Stop: 0850    Procedure: GASTRIC SLEEVE LAPAROSCOPIC WITH DAVINCI ROBOT; HIATAL HERNIA REPAIR (Abdomen) Diagnosis:       Obesity, Class II, BMI 35-39.9      (Obesity, Class II, BMI 35-39.9 [E66.9])    Surgeons: Yue Jones MD Provider: Patricio Bartholomew MD    Anesthesia Type: general ASA Status: 3          Anesthesia Type: general    Vitals  Vitals Value Taken Time   /72 03/16/23 1000   Temp 98.7 °F (37.1 °C) 03/16/23 0854   Pulse 69 03/16/23 1009   Resp 16 03/16/23 1000   SpO2 99 % 03/16/23 1009   Vitals shown include unvalidated device data.        Post Anesthesia Care and Evaluation    Patient location during evaluation: PACU  Patient participation: complete - patient participated  Level of consciousness: awake  Pain score: 0  Pain management: adequate  Anesthetic complications: No anesthetic complications  PONV Status: none  Cardiovascular status: acceptable  Respiratory status: acceptable  Hydration status: acceptable

## 2023-03-16 NOTE — ANESTHESIA PREPROCEDURE EVALUATION
Anesthesia Evaluation     Patient summary reviewed and Nursing notes reviewed   NPO Solid Status: > 8 hours             Airway   Mallampati: II  TM distance: >3 FB  Neck ROM: full  No difficulty expected  Dental - normal exam     Pulmonary - negative pulmonary ROS and normal exam   (-) sleep apnea, not a smoker  Cardiovascular - normal exam    ECG reviewed    (+) hypertension well controlled, hyperlipidemia,   (-) angina, SZYMANSKI      Neuro/Psych- negative ROS  GI/Hepatic/Renal/Endo    (+) obesity,   thyroid problem hypothyroidism    Musculoskeletal (-) negative ROS    Abdominal  - normal exam    Bowel sounds: normal.   Substance History - negative use     OB/GYN negative ob/gyn ROS         Other                        Anesthesia Plan    ASA 3     general       Anesthetic plan, risks, benefits, and alternatives have been provided, discussed and informed consent has been obtained with: patient.        CODE STATUS:

## 2023-03-16 NOTE — OP NOTE
PREOPERATIVE DIAGNOSIS:  Morbid obesity with multiple comorbidities as referenced in the most recent history and physical. Body mass index is 38.09 kg/m².      POSTOPERATIVE DIAGNOSIS:  Morbid obesity with multiple comorbidities as referenced in the most recent history and physical. Body mass index is 38.09 kg/m².      PROCEDURES PERFORMED:  1.  Robotic assisted laparoscopic sleeve gastrectomy   2.  Robotic assisted type 1 sliding hiatal hernia repair  SURGEON:  Yue Jones MD FACS, FAMBS    ASSISTANT:  Assistant: Vicenta Dove APRN    Surgery assisted and facilitated by a certified physician assistant, who directly resulted in a decreased operative time, anesthetic time, wound exposure, and possibly of an operative wound infection, thereby decreasing patient morbidity and ultimately total expenditures.  The surgical assistant assisted in placement of trochars, take down of the gastrocolic omentum, short gastric vessels and dissection at the angle of His.  Also assisted in retraction of the stomach during stapling so as not to kink the gastric sleeve.  Also assisted in removing of the gastric specimen, closure of the fascial defect as well as closure of the skin incisions.    ANESTHESIA:  General endotracheal.    ESTIMATED BLOOD LOSS:   Less than 25 mL unless dictated below.    FLUIDS:  Crystalloids.    SPECIMENS:  Gastric remnant    DRAINS:  None.    Implant Name Type Inv. Item Serial No.  Lot No. LRB No. Used Action   RELOAD STPLR SUREFORM 60 DAVINCI/X/XI 6ROW 2.5 WHT 1P/U - [id number] Implant RELOAD STPLR SUREFORM 60 DAVINCI/X/XI 6ROW 2.5 WHT 1P/U  [employer] [id number] N/A 1 Implanted   RELOAD STPLR SUREFORM 60 DAVINCI/X/XI 6ROW 3.5 JOHANNE 1P/U - [id number] Implant RELOAD STPLR SUREFORM 60 DAVINCI/X/XI 6ROW 3.5 JOHANNE 1P/U  INTUITIVE [employer] [id number] N/A 1 Implanted   DEV CLS WND VLOC/PBT JOSE NONABS 1/2CIR SZ2/0 27MM 23CM JOHANNE - [id number] Implant DEV CLS WND VLOC/PBT JOSE NONABS 1/2CIR SZ2/0  27MM 23CM [facility]OHANNE  [facility] [id number] N/A 1 Implanted   RELOAD STPLR SUREFORM 60 DAVINCI/X/XI 6ROW 2.5 WHT 1P/U - [id number] Implant RELOAD STPLR SUREFORM 60 DAVINCI/X/XI 6ROW 2.5 WHT 1P/U  INTUITIVE SURGICAL [id number] N/A 4 Implanted       COUNTS:  Correct.    COMPLICATIONS:  None.    INDICATIONS:  This patient with morbid obesity and associated comorbidities presents for elective laparoscopic, possible open sleeve gastrectomy.  The patient has received medical clearance to proceed.  The patient has undergone our extensive educational process and consent process and wishes to proceed.        DESCRIPTION OF PROCEDURE:  The patient was brought to the operating room and placed supine upon the operating room table. SCD hose were placed.  The patient underwent uneventful general endotracheal anesthesia per the anesthesiology staff. The abdomen was prepped with ChloraPrep and draped in the usual sterile fashion.  An Ioban was used as well if not allergic.  Depending on the technique to size the gastric sleeve, anesthesia staff either passed a 40-Thai ViSiGi bougie into the stomach to decompress and then was pulled back to the esophagus.      An 8 mm transverse incision was made at the left midclavicular line about 15 cm inferior to the xiphoid.  The peritoneal cavity was entered under direct camera visualization using a 5  mm 0° laparoscope and an Optiview trocar.  The abdomen was then insufflated to a pressure of 15-16 mmHg with CO2 gas.  Exploratory laparoscopy revealed no evidence of injury from the entrance technique and no significant abnormalities unless addendum dictated below.    The patient was placed in reverse Trendelenburg position.   A 12 mm trocar was placed in the right abdomen.  I then changed the scope to a 30 degree da Tess scope.  Under direct camera visualization two 8 mm trocar was placed in the left lateral midabdominal position.  Next the 30 degree 8 mm scope was brought into the 8 mm port just to  the left of the umbilicus and the corresponding trocar was docked to the da Tess robot.  Targeting then took place and then the rest of the trochars were docked to the robot and angled into position.  Instruments were brought in through the trochars in place and into view.          I then took control of the surgical console.A 2-0 V loc suture was used to create a liver hammock by suturing it to the epigastrium and then to the anterior babak and then back to the peritoneum just to the right of the for stitch.  The gastrocolic omentum was divided with the Vessel Sealer and this proceeded superiorly to the angle of [facility] taking down the short gastric vessels.  All posterior attachments of the lesser sac and posterior aspect of the stomach to the pancreas were taken down as well.          Dissection then proceeded medially taking down the greater curvature with the vessel sealer to just proximal to the pylorus.  The 40-Fijian orogastric tube was passed back down into the stomach for decompression and later to aid in sizing the sleeve.       I then used the Surefire stapler and a blue load was used to create the gastric sleeve.   There were additional firings to complete the gastric sleeve near the angle of [facility], see above.  ICG was used for a leak test by insufflating ICG into the orogastric tube and the staple line was closely inspected under firefly and there was no evidence of leak.      The patient did have a type 1 sliding hiatal hernia defect.   Patients undergoing gastric sleeve already increased risk of GERD and recurrence of hiatal hernia and repair of this was indicated. The defect was anterior, and when the ViSiGi was retracted the staple line at the upper stomach herniated superiorly above the hiatus.  A 2-0 V loc suture was used in a running fashion to close the anterior babak while the VISI G was in place in the esophagus and I also used this stitch to incorporate the left anterior babak with the angle of [facility]  of the stomach to act as gastropexy.       Tisseal was used on the staple line.The specimen was removed through the 12 mm port site.   The liver retractor suture was removed. The fascia at the 12 mm trocar site incision that was used for extraction was closed with a single 0 Vicryl suture in a figure-of-eight fashion placed under direct laparoscopic camera visualization with a suture passer and tying the knot extracorporeally.  The fascia in the area was infiltrated with local anesthesia. All incisions were then infiltrated with local anesthetic. The remaining trocars were removed under direct camera visualization with no bleeding noted from their sites.  The abdomen was desufflated of gas. The skin in each incision was closed using 4-0 antibiotic impregnated Monocryl in a subcuticular fashion followed by Dermabond.  The patient tolerated the procedure well without complication and was taken to the recovery room in stable condition.  All sponge, needle and instrument counts were correct.

## 2023-03-16 NOTE — PLAN OF CARE
Goal Outcome Evaluation:      Patient doing well, minimal complaints of pain. Patient able to ambulate multiple times this shift, patient tolerated a jeremiah clear diet. Patient is hopeful to discharge tomorrow. Patient has call light within reach and is able to make needs known.

## 2023-03-16 NOTE — ANESTHESIA PROCEDURE NOTES
Airway  Urgency: elective    Date/Time: 3/16/2023 7:39 AM  Airway not difficult    General Information and Staff    Patient location during procedure: OR  Anesthesiologist: Patricio Bartholomew MD  CRNA/CAA: Kerwin Peraza CRNA    Indications and Patient Condition  Indications for airway management: airway protection    Preoxygenated: yes  MILS maintained throughout  Mask difficulty assessment: 0 - not attempted    Final Airway Details  Final airway type: endotracheal airway      Successful airway: ETT  Cuffed: yes   Successful intubation technique: direct laryngoscopy  Facilitating devices/methods: intubating stylet and anterior pressure/BURP  Endotracheal tube insertion site: oral  Blade: Emily  Blade size: 4  ETT size (mm): 7.0  Cormack-Lehane Classification: grade IIa - partial view of glottis  Placement verified by: chest auscultation and capnometry   Cuff volume (mL): 9  Measured from: lips  ETT/EBT  to lips (cm): 22  Number of attempts at approach: 1  Assessment: lips, teeth, and gum same as pre-op and atraumatic intubation

## 2023-03-17 ENCOUNTER — READMISSION MANAGEMENT (OUTPATIENT)
Dept: CALL CENTER | Facility: HOSPITAL | Age: 63
End: 2023-03-17
Payer: COMMERCIAL

## 2023-03-17 VITALS
BODY MASS INDEX: 37.61 KG/M2 | HEART RATE: 63 BPM | OXYGEN SATURATION: 97 % | HEIGHT: 67 IN | RESPIRATION RATE: 15 BRPM | WEIGHT: 239.6 LBS | DIASTOLIC BLOOD PRESSURE: 75 MMHG | SYSTOLIC BLOOD PRESSURE: 145 MMHG | TEMPERATURE: 98 F

## 2023-03-17 LAB
LAB AP CASE REPORT: NORMAL
PATH REPORT.FINAL DX SPEC: NORMAL
PATH REPORT.GROSS SPEC: NORMAL

## 2023-03-17 PROCEDURE — 25010000002 HYDRALAZINE PER 20 MG: Performed by: SURGERY

## 2023-03-17 PROCEDURE — 25010000002 ENOXAPARIN PER 10 MG: Performed by: SURGERY

## 2023-03-17 PROCEDURE — 99024 POSTOP FOLLOW-UP VISIT: CPT | Performed by: SURGERY

## 2023-03-17 PROCEDURE — 25010000002 THIAMINE PER 100 MG: Performed by: SURGERY

## 2023-03-17 PROCEDURE — 25010000002 CYANOCOBALAMIN PER 1000 MCG: Performed by: SURGERY

## 2023-03-17 RX ORDER — HYDROMORPHONE HYDROCHLORIDE 2 MG/1
2 TABLET ORAL EVERY 6 HOURS PRN
Qty: 18 TABLET | Refills: 0 | Status: SHIPPED | OUTPATIENT
Start: 2023-03-17 | End: 2023-03-30

## 2023-03-17 RX ORDER — ONDANSETRON 8 MG/1
8 TABLET, ORALLY DISINTEGRATING ORAL EVERY 8 HOURS PRN
Qty: 30 TABLET | Refills: 5 | Status: SHIPPED | OUTPATIENT
Start: 2023-03-17 | End: 2023-03-30

## 2023-03-17 RX ORDER — URSODIOL 250 MG/1
250 TABLET, FILM COATED ORAL 2 TIMES DAILY
Qty: 60 TABLET | Refills: 5 | Status: SHIPPED | OUTPATIENT
Start: 2023-03-17 | End: 2023-04-16

## 2023-03-17 RX ADMIN — ENOXAPARIN SODIUM 40 MG: 100 INJECTION SUBCUTANEOUS at 08:43

## 2023-03-17 RX ADMIN — HYDROMORPHONE HYDROCHLORIDE 2 MG: 2 TABLET ORAL at 05:32

## 2023-03-17 RX ADMIN — HYOSCYAMINE SULFATE 125 MCG: 0.12 TABLET ORAL at 10:32

## 2023-03-17 RX ADMIN — METOPROLOL SUCCINATE 50 MG: 50 TABLET, EXTENDED RELEASE ORAL at 08:44

## 2023-03-17 RX ADMIN — ACETAMINOPHEN 1000 MG: 500 TABLET, FILM COATED ORAL at 05:31

## 2023-03-17 RX ADMIN — CYANOCOBALAMIN 1000 MCG: 1000 INJECTION, SOLUTION INTRAMUSCULAR; SUBCUTANEOUS at 08:43

## 2023-03-17 RX ADMIN — LANSOPRAZOLE 30 MG: 30 TABLET, ORALLY DISINTEGRATING, DELAYED RELEASE ORAL at 05:31

## 2023-03-17 RX ADMIN — HYDROMORPHONE HYDROCHLORIDE 2 MG: 2 TABLET ORAL at 09:44

## 2023-03-17 RX ADMIN — LEVOTHYROXINE SODIUM 75 MCG: 0.07 TABLET ORAL at 08:43

## 2023-03-17 RX ADMIN — HYDRALAZINE HYDROCHLORIDE 10 MG: 20 INJECTION INTRAMUSCULAR; INTRAVENOUS at 05:32

## 2023-03-17 RX ADMIN — THIAMINE HYDROCHLORIDE 100 ML/HR: 100 INJECTION, SOLUTION INTRAMUSCULAR; INTRAVENOUS at 00:41

## 2023-03-17 NOTE — CASE MANAGEMENT/SOCIAL WORK
Case Management Discharge Note      Final Note: home prior to cm assessment           Final Discharge Disposition Code: 01 - home or self-care

## 2023-03-17 NOTE — PLAN OF CARE
Goal Outcome Evaluation:           Progress: improving  Outcome Evaluation: Patient ambulating halls through the shift, She has not c/o any pain. Patient has call light in hand and bed lowered.

## 2023-03-17 NOTE — OUTREACH NOTE
Prep Survey    Flowsheet Row Responses   [facility] facility patient discharged from? [facility]   Is LACE score < 7 ? Yes   Eligibility [facility]   Date of Admission 03/16/23   Date of Discharge 03/17/23   Discharge Disposition Home or Self Care   Discharge diagnosis GASTRIC SLEEVE LAPAROSCOPIC   Does the patient have one of the following disease processes/diagnoses(primary or secondary)? General Surgery   Does the patient have Home health ordered? No   Is there a DME ordered? No   Prep survey completed? Yes          DOUG ANTONIO - Registered Nurse

## 2023-03-20 ENCOUNTER — TRANSITIONAL CARE MANAGEMENT TELEPHONE ENCOUNTER (OUTPATIENT)
Dept: CALL CENTER | Facility: HOSPITAL | Age: 63
End: 2023-03-20
Payer: COMMERCIAL

## 2023-03-20 ENCOUNTER — OFFICE VISIT (OUTPATIENT)
Dept: BARIATRICS/WEIGHT MGMT | Facility: CLINIC | Age: 63
End: 2023-03-20
Payer: COMMERCIAL

## 2023-03-20 VITALS
BODY MASS INDEX: 37.29 KG/M2 | DIASTOLIC BLOOD PRESSURE: 83 MMHG | WEIGHT: 237.6 LBS | TEMPERATURE: 97.7 F | HEIGHT: 67 IN | SYSTOLIC BLOOD PRESSURE: 128 MMHG | OXYGEN SATURATION: 99 % | RESPIRATION RATE: 15 BRPM | HEART RATE: 65 BPM

## 2023-03-20 DIAGNOSIS — Z51.89 VISIT FOR WOUND CHECK: ICD-10-CM

## 2023-03-20 DIAGNOSIS — E66.9 OBESITY, CLASS II, BMI 35-39.9: Primary | ICD-10-CM

## 2023-03-20 PROCEDURE — 99024 POSTOP FOLLOW-UP VISIT: CPT | Performed by: NURSE PRACTITIONER

## 2023-03-20 NOTE — PROGRESS NOTES
[facility] BAR SURG [facility] BARIATRIC SURGERY  [address] [place] [address]  [address] [place] [address]  Dept: [phone]  3/20/2023      Tirso Palomo.  [id number]  [id number]  1960  female      Chief Complaint   Patient presents with   • Post-op     1 week sleeve     Gastric Sleeve Laparoscopic With Davinci Robot; Hiatal Hernia Repair  3/16/2023    BH Post-Op Bariatric Surgery:     HPI:   Weight loss in the last week:2 pounds    Wt Readings from Last 10 Encounters:   03/20/23 108 kg (237 lb 9.6 oz)   03/16/23 109 kg (239 lb 9.6 oz)   03/10/23 111 kg (245 lb 3.2 oz)   02/09/23 111 kg (244 lb 11.4 oz)   02/02/23 113 kg (248 lb 12.8 oz)   11/23/22 109 kg (241 lb)   11/07/22 109 kg (241 lb 3.2 oz)   10/24/22 105 kg (231 lb)   06/02/22 102 kg (225 lb)   04/13/21 116 kg (256 lb 12.8 oz)   no nausea or vomiting, abdominal pain far right incision 1/10 - improved overall   Fluid intake 1 bottle water, 20-30 oz of Gatorade, jello, Cup of chicken broth , 2 protein shakes   Bowel movement since surgery   Taking tylenol instead of narcotics       Review of Systems   Constitutional: Positive for activity change and appetite change.   Respiratory: Negative.    Cardiovascular: Negative.    Gastrointestinal: Negative.    Musculoskeletal: Positive for myalgias.     No dysphagia no nausea no vomiting  No shortness of breath no chest pain  No abdominal pain  No extremity pain or swelling    Patient Active Problem List   Diagnosis   • Chronic fatigue   • Essential hypertension   • Snoring   • Hemorrhoids   • Multiple joint pain   • Acquired hypothyroidism   • Screening mammogram, encounter for   • Screening for cervical cancer   • Screening for colon cancer   • Screening for hyperlipidemia   • Encounter for general adult medical examination with abnormal findings   • Vitamin D deficiency   • Urinary incontinence   • Stress incontinence of urine   • Spondylosis of  lumbosacral spine without myelopathy   • Class 2 severe obesity due to excess calories with serious comorbidity and body mass index (BMI) of 37.0 to 37.9 in adult ([facility])   • Menopause present   • Hypermobility of urethra   • History of colonic polyps   • Female bladder prolapse   • Hormone replacement therapy   • Abscess of right breast   • Normal pelvic exam   • Epidermal inclusion cyst   • Obesity, Class II, BMI 35-39.9   • Obesity, Class III, BMI 40-49.9 (morbid obesity) ([facility])       The following portions of the patient's history were reviewed and updated as appropriate: allergies, current medications, past family history, past medical history, past social history, past surgical history, and problem list.    Vitals:    03/20/23 1312   BP: 128/83   Pulse: 65   Resp: 15   Temp: 97.7 °F (36.5 °C)   SpO2: 99%       Physical Exam  Constitutional:       Appearance: Normal appearance. She is obese.   Pulmonary:      Effort: Pulmonary effort is normal.   Abdominal:      General: Abdomen is flat.      Palpations: Abdomen is soft.      Comments: Incisions clean, dry and intact    Musculoskeletal:      Comments: No redness, warmth, knot like area, or swelling noted behind each knee or calf.    Skin:     General: Skin is warm and dry.   Neurological:      General: No focal deficit present.      Mental Status: She is alert and oriented to person, place, and time.   Psychiatric:         Mood and Affect: Mood normal.         Behavior: Behavior normal.         Thought Content: Thought content normal.         Judgment: Judgment normal.       Awake and alert  Normal pulmonary effort  Incisions with no erythema and appropriate abdominal tenderness  Extremities with no tenderness and no swelling    Assessment:   Patient Active Problem List   Diagnosis   • Chronic fatigue   • Essential hypertension   • Snoring   • Hemorrhoids   • Multiple joint pain   • Acquired hypothyroidism   • Screening mammogram, encounter for   • Screening for  "cervical cancer   • Screening for colon cancer   • Screening for hyperlipidemia   • Encounter for general adult medical examination with abnormal findings   • Vitamin D deficiency   • Urinary incontinence   • Stress incontinence of urine   • Spondylosis of lumbosacral spine without myelopathy   • Class 2 severe obesity due to excess calories with serious comorbidity and body mass index (BMI) of 37.0 to 37.9 in adult ([facility])   • Menopause present   • Hypermobility of urethra   • History of colonic polyps   • Female bladder prolapse   • Hormone replacement therapy   • Abscess of right breast   • Normal pelvic exam   • Epidermal inclusion cyst   • Obesity, Class II, BMI 35-39.9   • Obesity, Class III, BMI 40-49.9 (morbid obesity) ([facility])       Post-op, the patient is doing well. She is not having any nausea or vomiting. Pt is tolerating po fluids well. Diet progression reviewed with pt today. Ok to start stage 2 diet. Plan to follow up with dietician at 1 month apt.     Pt did note she was having some \" tightness behind both of her knee\". No redness, warmth, swelling, or knot like area felt behind either knee. Pt did also state \" tightness if improving.\" Pt is on eliquis for DVT prophylaxis. Pt encouraged to call office with any symptoms listed above and will then order a lowe extremity ultrasound.     Plan:   Reviewed with patient the importance of following the manual for diet progression. Increase activity as tolerated. Continue increasing daily intake of protein and water.   Return to work: the patient is to return to 3 weeks from their surgery date with no restrictions unless they develop medical problems in which we will see them back in the office. They received a note in our office today with their return to work date.  Activity restrictions: no lifting, pushing or pulling over 25lbs for 3 weeks.   Recommended patient be sure to get at least 70 grams of protein per day. Discussed with the patient the recommended " amount of water per day to intake. Reviewed vitamin requirements. Be sure to do routine exercise and increase activity as tolerated. No asa, nsaids or steroids for 8 weeks. Patient may use miralax as needed if necessary.     Instructions / Recommendations: dietary counseling recommended, recommended a daily protein intake of  grams, vitamin supplement(s) recommended, recommended exercising at least 150 minutes per week, behavior modifications recommended and instructed to call the office for concerns, questions, or problems.     The patient was instructed to follow up at one month follow up appt.     The patient was counseled regarding post op bariatric manual    DARINEL Pompa  [facility] bariatrics

## 2023-03-20 NOTE — OUTREACH NOTE
Call Center TCM Note    Flowsheet Row Responses   [facility] patient discharged from? [facility]   Does the patient have one of the following disease processes/diagnoses(primary or secondary)? General Surgery   TCM attempt successful? Yes   Call start time 1114   Call end time 1128   Discharge diagnosis GASTRIC SLEEVE LAPAROSCOPIC   Meds reviewed with patient/caregiver? Yes   Is the patient having any side effects they believe may be caused by any medication additions or changes? No   Does the patient have all medications related to this admission filled (includes all antibiotics, pain medications, etc.) Yes   Is the patient taking all medications as directed (includes completed medication regime)? Yes   Comments Hospital d/c f/u appt on 3/30/23 @4:15pm   Does the patient have an appointment with their PCP within 7 days of discharge? Yes   Has home health visited the patient within 72 hours of discharge? N/A   Psychosocial issues? No   Did the patient receive a copy of their discharge instructions? Yes   Nursing interventions Reviewed instructions with patient   What is the patient's perception of their health status since discharge? Improving   Nursing interventions Nurse provided patient education   Is the patient /caregiver able to teach back basic post-op care? Continue use of incentive spirometry at least 1 week post discharge, No tub bath, swimming, or hot tub until instructed by MD, Keep incision areas clean,dry and protected, Lifting as instructed by MD in discharge instructions   Is the patient/caregiver able to teach back signs and symptoms of incisional infection? Increased drainage or bleeding, Increased redness, swelling or pain at the incisonal site   Is the patient/caregiver able to teach back steps to recovery at home? Set small, achievable goals for return to baseline health   Is the patient/caregiver able to teach back the hierarchy of who to call/visit for symptoms/problems? PCP, Specialist, Home  health nurse, Urgent Care, ED, 911 Yes   TCM call completed? Yes   Call end time 1128   Would this patient benefit from a Referral to [facility] Social Work? No   Is the patient interested in additional calls from an ambulatory ?  NOTE:  applies to high risk patients requiring additional follow-up. No          Pham Turner RN    3/20/2023, 11:28 EDT

## 2023-03-29 NOTE — PROGRESS NOTES
Subjective   Tirso Palomo is a 62 y.o. female. Presents to [facility]    Tirso was seen at [facility] . She was admitted on 2023  for robotic laparoscopic sleeve gastrectomy. She was discharged on 2023. Labs that were performed during the encounter included: CMP-abnormal, CBC-abnormal and magnesium-normal. Diagnostic studies that were performed included: none. Currently Tirso receives care at home. Complications from the hospital stay include none. The patient stated that they do not need help with their daily life and activities. The patient stated that they do have emotional support at home.  Current outpatient and discharge medications have been reconciled for the patient.  Reviewed by: Miryam Martinez MD      Chief Complaint   Patient presents with   • hospital f/u       Obesity  This is a chronic problem. The current episode started more than 1 year ago. Pertinent negatives include no abdominal pain, chest pain, chills, congestion, fever, headaches, vertigo or vomiting. Nothing aggravates the symptoms. The treatment provided moderate relief.        I personally reviewed and updated the patient's allergies, medications, problem list, and past medical, surgical, social, and family history. I have reviewed and confirmed the accuracy of the History of Present Illness and Review of Symptoms as documented by the MA/LPN/RN. Miryam Martinez MD    Allergies:  Allergies   Allergen Reactions   • Benadryl [Diphenhydramine] Other (See Comments)     Makes her too sleepy   • Lisinopril Cough   • Topamax [Topiramate] GI Intolerance   • Amlodipine Palpitations       Social History:  Social History     Socioeconomic History   • Marital status:    Tobacco Use   • Smoking status: Former     Packs/day: 0.25     Types: Cigarettes     Start date: 1990     Quit date: 2010     Years since quittin.6     Passive exposure: Past   • Smokeless tobacco: Never   • Tobacco  comments:     Quit cold turkey   Vaping Use   • Vaping Use: Never used   Substance and Sexual Activity   • Alcohol use: No   • Drug use: No   • Sexual activity: Yes     Partners: Male     Birth control/protection: Post-menopausal       Family History:  Family History   Problem Relation Age of Onset   • Cancer Mother         breast   • Hypertension Father    • Heart attack Father    • Cancer Father         prostate   • Obesity Brother    • Hypertension Brother    • Arthritis Brother    • Breast cancer Maternal Aunt    • Heart disease Maternal Grandmother    • Diabetes Maternal Grandmother    • Obesity Maternal Grandfather    • Stroke Maternal Grandfather    • Hypertension Maternal Grandfather    • Cancer Paternal Grandmother    • Stroke Paternal Grandfather    • Heart attack Paternal Grandfather    • Hypertension Paternal Grandfather        Past Medical History :  Patient Active Problem List   Diagnosis   • Chronic fatigue   • Essential hypertension   • Snoring   • Hemorrhoids   • Multiple joint pain   • Acquired hypothyroidism   • Screening mammogram, encounter for   • Screening for cervical cancer   • Screening for colon cancer   • Screening for hyperlipidemia   • Encounter for general adult medical examination with abnormal findings   • Vitamin D deficiency   • Urinary incontinence   • Stress incontinence of urine   • Spondylosis of lumbosacral spine without myelopathy   • Class 2 severe obesity due to excess calories with serious comorbidity and body mass index (BMI) of 36.0 to 36.9 in adult (HCC)   • Menopause present   • Hypermobility of urethra   • History of colonic polyps   • Female bladder prolapse   • Hormone replacement therapy   • Abscess of right breast   • Normal pelvic exam   • Epidermal inclusion cyst   • Status post sleeve gastrectomy   • Chronic idiopathic constipation       Medication List:    Current Outpatient Medications:   •  apixaban (ELIQUIS) 5 MG tablet tablet, Take 1 tablet by mouth 2  (Two) Times a Day for 14 days., Disp: 28 tablet, Rfl: 0  •  Apoaequorin (Prevagen) 10 MG capsule, Take 1 capsule by mouth Daily., Disp: , Rfl:   •  levothyroxine (SYNTHROID, LEVOTHROID) 75 MCG tablet, Take 1 tablet by mouth Daily., Disp: 30 tablet, Rfl: 3  •  metoprolol succinate XL (TOPROL-XL) 50 MG 24 hr tablet, Take 1 tablet by mouth Daily., Disp: 90 tablet, Rfl: 3  •  Minoxidil 5 % foam, Apply 1 application topically As Needed., Disp: , Rfl:   •  Misc Natural Products (COLON CLEANSER PO), Take  by mouth. HOLD 5-7  days before surgery, Disp: , Rfl:   •  spironolactone (ALDACTONE) 100 MG tablet, Take 1 tablet by mouth 2 (Two) Times a Day. HOLD DOS, Disp: , Rfl:   •  ursodiol (ACTIGALL) 250 MG tablet, Take 1 tablet by mouth 2 (Two) Times a Day for 30 days., Disp: 60 tablet, Rfl: 5  •  Polyethylene Glycol 1000 powder, 17 g Daily., Disp: 2500 g, Rfl: 1    Past Surgical History:  Past Surgical History:   Procedure Laterality Date   • BREAST SURGERY Right 2012    biopsy   • COLONOSCOPY  2018   • ENDOSCOPY N/A 02/09/2023    Procedure: ESOPHAGOGASTRODUODENOSCOPY with gastric biopsy;  Surgeon: Yue Jones MD;  Location: [facility] ENDOSCOPY;  Service: General;  Laterality: N/A;  normal   • GASTRIC SLEEVE LAPAROSCOPIC N/A 3/16/2023    Procedure: GASTRIC SLEEVE LAPAROSCOPIC WITH DAVINCI ROBOT; HIATAL HERNIA REPAIR;  Surgeon: Yue Jones MD;  Location: [facility] MAIN OR;  Service: Robotics - DaVinci;  Laterality: N/A;   • TOENAIL EXCISION      01/2022   • TUBAL ABDOMINAL LIGATION  1993       Review of Systems:  Review of Systems   Constitutional: Negative for chills and fever.   HENT: Negative for congestion.    Cardiovascular: Negative for chest pain.   Gastrointestinal: Negative for abdominal pain and vomiting.   Neurological: Negative for vertigo.       Physical Exam:  Vital Signs:  Vital Signs:   /82 (BP Location: Right arm, Patient Position: Sitting, Cuff Size: Adult)   Pulse 75   Temp 96.8 °F (36 °C) (Temporal)    "Resp 20   Ht 168.9 cm (66.5\")   Wt 105 kg (231 lb 9.6 oz)   SpO2 98%   BMI 36.83 kg/m²     Result Review :   The following data was reviewed by: Miryam Martinez MD on 03/30/2023:  CMP    CMP 2/27/23 3/7/23 3/16/23   Glucose 102 (A) 100 (A) 127 (A)   BUN 15 11 12   Creatinine 0.91 0.94 0.75   eGFR 70.6 67.9 89.0   Sodium 138 138 138   Potassium 4.5 4.9 5.0   Chloride 102 105 104   Calcium 10.2 10.0 9.0   Total Protein 8.2  6.4   Albumin 4.5  3.9   Globulin 3.7  2.5   Total Bilirubin 0.3  0.3   Alkaline Phosphatase 71  63   AST (SGOT) 15  29   ALT (SGPT) 16  17   Albumin/Globulin Ratio 1.2  1.6   BUN/Creatinine Ratio 16.5 11.7 16.0   Anion Gap 8.0 6.6 6.0   (A) Abnormal value       Comments are available for some flowsheets but are not being displayed.           CBC    CBC 7/8/22 7/8/22 2/27/23 3/16/23    1013 1013     WBC Comment 10.5 8.93 12.30 (A)   RBC Comment 4.81 4.71 4.45   Hemoglobin 13.6  13.8 12.7   Hematocrit 42.2  41.0 40.1   MCV 88  87.0 90.0   MCH 28.3  29.3 28.5   MCHC 32.2  33.7 31.6   RDW 14.5  12.6 13.8   Platelets 381  337 291   (A) Abnormal value       Comments are available for some flowsheets but are not being displayed.           Data reviewed: Recent hospitalization notes gastric sleeve surgery.          Physical Exam  Abdominal:      Comments: Incisions are clean dry intact         Assessment and Plan:  Problems Addressed this Visit        Endocrine and Metabolic    Class 2 severe obesity due to excess calories with serious comorbidity and body mass index (BMI) of 36.0 to 36.9 in adult (HCC)       Gastrointestinal Abdominal     Chronic idiopathic constipation     worse  Start miralax    Diagnosis, treatment and and course discussed. Potential side effects discussed. Return if there is worsening or persistence of symptoms.            Relevant Medications    Polyethylene Glycol 1000 powder       Other    Status post sleeve gastrectomy - Primary     She is healing well  Continue current " treatment          Diagnoses       Codes Comments    Status post sleeve gastrectomy    -  Primary ICD-10-CM: Z90.3  ICD-9-CM: V45.75     Chronic idiopathic constipation     ICD-10-CM: K59.04  ICD-9-CM: 564.00     Class 2 severe obesity due to excess calories with serious comorbidity and body mass index (BMI) of 36.0 to 36.9 in adult (HCC)     ICD-10-CM: E66.01, Z68.36  ICD-9-CM: 278.01, V85.36            An After Visit Summary and PPPS were given to the patient.

## 2023-03-29 NOTE — DISCHARGE SUMMARY
"Discharge Summary    Patient name: Tirso Palomo    Medical record number: [id number]    Admission date: 3/16/2023  Discharge date:  3/17/2023    Attending physician: Dr. Yue Jones    Primary care physician: Miryam Martinez MD    Referring physician: Yue Jones MD  [address]  [place] [address]    Condition on discharge: Stable    Primary Diagnoses:  Morbid obesity with co-morbidities    Operative Procedure: Robotic Laparoscopic sleeve gastrectomy     Tirso Palomo  is post op day one status post procedure listed. Patient denies shortness of air and lower extremity pain. Feels better than yesterday. No vomiting this am. Ambulating well and using incentive spirometer.          /75 (BP Location: Left arm, Patient Position: Sitting)   Pulse 63   Temp 98 °F (36.7 °C) (Oral)   Resp 15   Ht 168.9 cm (66.5\")   Wt 109 kg (239 lb 9.6 oz)   LMP  (LMP Unknown)   SpO2 97%   BMI 38.09 kg/m²     General:  alert, appears stated age and cooperative   Abdomen: soft, bowel sounds active, appropriate tenderness   Incision:   healing well, no drainage, no erythema, no hernia, no seroma, no swelling, no dehiscence, incision well approximated   Heart: Regular rate   Lungs: Clear to auscultation bilaterally     I reviewed the patient's new clinical results.     Lab Results (last 24 hours)     Procedure Component Value Units Date/Time    Comprehensive Metabolic Panel [[id number]]  (Abnormal) Collected: 03/16/23 2129    Specimen: Blood Updated: 03/16/23 2154     Glucose 127 mg/dL      BUN 12 mg/dL      Creatinine 0.75 mg/dL      Sodium 138 mmol/L      Potassium 5.0 mmol/L      Comment: Slight hemolysis detected by analyzer. Results may be affected.        Chloride 104 mmol/L      CO2 28.0 mmol/L      Calcium 9.0 mg/dL      Total Protein 6.4 g/dL      Albumin 3.9 g/dL      ALT (SGPT) 17 U/L      AST (SGOT) 29 U/L      Alkaline Phosphatase 63 U/L      Total Bilirubin 0.3 mg/dL      Globulin 2.5 gm/dL      " A/G Ratio 1.6 g/dL      BUN/Creatinine Ratio 16.0     Anion Gap 6.0 mmol/L      eGFR 89.0 mL/min/1.73     Narrative:      GFR Normal >60  Chronic Kidney Disease <60  Kidney Failure <15      Phosphorus [[id number]]  (Normal) Collected: 03/16/23 2129    Specimen: Blood Updated: 03/16/23 2154     Phosphorus 3.3 mg/dL     Magnesium [[id number]]  (Normal) Collected: 03/16/23 2129    Specimen: Blood Updated: 03/16/23 2154     Magnesium 1.9 mg/dL     CBC & Differential [[id number]]  (Abnormal) Collected: 03/16/23 1914    Specimen: Blood Updated: 03/16/23 1934    Narrative:      The following orders were created for panel order CBC & Differential.  Procedure                               Abnormality         Status                     ---------                               -----------         ------                     CBC Auto Differential[[id number]]        Abnormal            Final result                 Please view results for these tests on the individual orders.    CBC Auto Differential [[id number]]  (Abnormal) Collected: 03/16/23 1914    Specimen: Blood Updated: 03/16/23 1934     WBC 12.30 10*3/mm3      RBC 4.45 10*6/mm3      Hemoglobin 12.7 g/dL      Hematocrit 40.1 %      MCV 90.0 fL      MCH 28.5 pg      MCHC 31.6 g/dL      RDW 13.8 %      RDW-SD 42.9 fl      MPV 8.7 fL      Platelets 291 10*3/mm3      Neutrophil % 80.0 %      Lymphocyte % 13.5 %      Monocyte % 6.2 %      Eosinophil % 0.1 %      Basophil % 0.2 %      Neutrophils, Absolute 9.90 10*3/mm3      Lymphocytes, Absolute 1.70 10*3/mm3      Monocytes, Absolute 0.80 10*3/mm3      Eosinophils, Absolute 0.00 10*3/mm3      Basophils, Absolute 0.00 10*3/mm3      nRBC 0.0 /100 WBC              Assessment:      Doing well postoperatively.      Plan:   1. Continue Stage 1 diet  2. Continue with ambulation and Incentive spirometry  3. Plan for d/c home      Hospital Course: The patient is a very pleasant 62 y.o. female that was admitted to the hospital with with morbid  obesity underwent a laparoscopic gastric sleeve.  The next morning the patient was able to tolerate liquids and was ambulating and vital signs and labs were stable.  The patient is discharged home with follow-up in my office next week.      Discharge medications:      Discharge Medications      New Medications      Instructions Start Date   Eliquis 5 MG tablet tablet  Generic drug: apixaban   5 mg, Oral, 2 Times Daily      HYDROmorphone 2 MG tablet  Commonly known as: Dilaudid   2 mg, Oral, Every 6 Hours PRN      ondansetron ODT 8 MG disintegrating tablet  Commonly known as: ZOFRAN-ODT   8 mg, Translingual, Every 8 Hours PRN      ursodiol 250 MG tablet  Commonly known as: ACTIGALL   250 mg, Oral, 2 Times Daily         Continue These Medications      Instructions Start Date   CBD4 Freeze Pump Maximum Str 4-1 % cream  Generic drug: Lidocaine-Menthol   1 application, As Needed      cholecalciferol 25 MCG (1000 UT) tablet  Commonly known as: VITAMIN D3   1,000 Units, Daily      COLON CLEANSER PO   Take  by mouth. HOLD 5-7  days before surgery      fish oil 1200 MG capsule capsule   1,200 mg, Daily      levothyroxine 75 MCG tablet  Commonly known as: SYNTHROID, LEVOTHROID   75 mcg, Oral, Daily      metoprolol succinate XL 50 MG 24 hr tablet  Commonly known as: TOPROL-XL   50 mg, Oral, Daily      Minoxidil 5 % foam   1 application, Apply externally, As Needed      multivitamin with minerals tablet tablet   1 tablet, Daily      multivitamin with minerals tablet tablet   1 tablet, Daily      omeprazole 20 MG capsule  Commonly known as: priLOSEC   20 mg, Daily      Prevagen 10 MG capsule  Generic drug: Apoaequorin   1 capsule, Oral, Daily      PROBIOTIC-10 PO   1 tablet, Daily      spironolactone 100 MG tablet  Commonly known as: ALDACTONE   1 tablet, Oral, 2 Times Daily, HOLD DOS      vitamin B-12 1000 MCG tablet  Commonly known as: CYANOCOBALAMIN   1,000 mcg, Daily         Stop These Medications    estradiol 0.5 MG  tablet  Commonly known as: ESTRACE            Discharge instructions:  Per Bariatric manual; per our protocol      Follow-up appointment: Follow up with Dr. Jones in the office as scheduled.  If not already scheduled call for appointment at [phone]

## 2023-03-30 ENCOUNTER — OFFICE VISIT (OUTPATIENT)
Dept: FAMILY MEDICINE CLINIC | Facility: CLINIC | Age: 63
End: 2023-03-30
Payer: COMMERCIAL

## 2023-03-30 VITALS
HEART RATE: 75 BPM | RESPIRATION RATE: 20 BRPM | OXYGEN SATURATION: 98 % | HEIGHT: 66 IN | BODY MASS INDEX: 37.22 KG/M2 | SYSTOLIC BLOOD PRESSURE: 112 MMHG | TEMPERATURE: 96.8 F | WEIGHT: 231.6 LBS | DIASTOLIC BLOOD PRESSURE: 82 MMHG

## 2023-03-30 DIAGNOSIS — Z90.3 STATUS POST SLEEVE GASTRECTOMY: Primary | ICD-10-CM

## 2023-03-30 DIAGNOSIS — K59.04 CHRONIC IDIOPATHIC CONSTIPATION: ICD-10-CM

## 2023-03-30 DIAGNOSIS — E66.01 CLASS 2 SEVERE OBESITY DUE TO EXCESS CALORIES WITH SERIOUS COMORBIDITY AND BODY MASS INDEX (BMI) OF 36.0 TO 36.9 IN ADULT: ICD-10-CM

## 2023-03-30 PROBLEM — E66.813 OBESITY, CLASS III, BMI 40-49.9 (MORBID OBESITY): Status: RESOLVED | Noted: 2023-03-01 | Resolved: 2023-03-30

## 2023-03-30 PROBLEM — E66.812 OBESITY, CLASS II, BMI 35-39.9: Status: RESOLVED | Noted: 2023-02-02 | Resolved: 2023-03-30

## 2023-03-30 PROBLEM — E66.9 OBESITY, CLASS II, BMI 35-39.9: Status: RESOLVED | Noted: 2023-02-02 | Resolved: 2023-03-30

## 2023-03-30 RX ORDER — POLYETHYLENE GLYCOL 1000
17 POWDER (GRAM) MISCELLANEOUS DAILY
Qty: 2500 G | Refills: 1 | Status: SHIPPED | OUTPATIENT
Start: 2023-03-30

## 2023-03-30 NOTE — PROGRESS NOTES
"Nutrition Services    Patient Name: Tirso Palomo  YOB: 1960  MRN: [id number]  Date of Service: 04/19/23      ICD-10-CM ICD-9-CM   1. Obesity, Class II, BMI 35-39.9  E66.9 278.00        NUTRITION ASSESSMENT - BARIATRIC SURGERY      Reason for Visit 1 month post op VSG     H&P      Past Medical History:   Diagnosis Date   • Allergic    • Arthritis    • Elevated cholesterol    • Hypertension    • Hypothyroidism    • Insomnia    • Sciatica        Past Surgical History:   Procedure Laterality Date   • BREAST SURGERY Right 2012    biopsy   • COLONOSCOPY  2018   • ENDOSCOPY N/A 02/09/2023    Procedure: ESOPHAGOGASTRODUODENOSCOPY with gastric biopsy;  Surgeon: Yue Jones MD;  Location: [facility] ENDOSCOPY;  Service: General;  Laterality: N/A;  normal   • GASTRIC SLEEVE LAPAROSCOPIC N/A 3/16/2023    Procedure: GASTRIC SLEEVE LAPAROSCOPIC WITH DAVINCI ROBOT; HIATAL HERNIA REPAIR;  Surgeon: Yue Jones MD;  Location: [facility] MAIN OR;  Service: Robotics - DaVinci;  Laterality: N/A;   • TOENAIL EXCISION      01/2022   • TUBAL ABDOMINAL LIGATION  1993                  Encounter Information        Visit Narrative     Patient reports she is taking miralax for constipation. Overall patient doing well with diet progression. Has tried some foods that she did not tolerate.     Diet Recall:   Breakfast: toast, cottage cheese  Lunch: soups   Dinner: protein shake  Snacks: cheese slices  Beverages: water, cherry juice, tea, amrit aid    Exercise: walking, training for 5k.     Supplements: bariatric vitamins, fusion completes    Self Monitoring:          Anthropometrics        Current Height, Weight Height: 168.9 cm (66.5\")  Weight: 102 kg (223 lb 12.8 oz) (04/19/23 1417)       Trending Weight Hx  4.9% weight loss in 1 month    Wt Readings from Last 30 Encounters:   04/19/23 1417 102 kg (223 lb 12.8 oz)   03/30/23 1542 105 kg (231 lb 9.6 oz)   03/20/23 1312 108 kg (237 lb 9.6 oz)   03/16/23 0610 109 kg (239 lb 9.6 oz) "   03/06/23 1606 111 kg (245 lb)   03/10/23 0959 111 kg (245 lb 3.2 oz)   02/09/23 1114 111 kg (244 lb 11.4 oz)   02/02/23 1244 112 kg (246 lb)   02/02/23 0756 113 kg (248 lb 12.8 oz)   11/23/22 1001 109 kg (241 lb)   11/07/22 1347 109 kg (241 lb 3.2 oz)   10/24/22 0959 105 kg (231 lb)   06/02/22 0924 102 kg (225 lb)   04/13/21 1443 116 kg (256 lb 12.8 oz)      BMI kg/m2 Body mass index is 35.58 kg/m².       Nutrition Diagnosis         Nutrition Dx Statement Overweight/obesity RT multifactorial biochemical, behavioral and environmental contributors to disease AEB BMI 35.58kg/m^2         Nutrition Intervention         Nutrition Intervention Nutrition education related to diet modification and physical activity       Monitor/Evaluation        New Goals 1. Patient to continue exercise  2. Patient to aim for 60-80 grams of protein each day  3. Patient to aim for 64oz of water each day       Total time spent with pt 30 minutes of which 30 minutes were spent on education.       Electronically signed by:  Becky Guardado RD  04/19/23 14:17 EDT

## 2023-04-13 ENCOUNTER — TELEPHONE (OUTPATIENT)
Dept: FAMILY MEDICINE CLINIC | Facility: CLINIC | Age: 63
End: 2023-04-13

## 2023-04-13 DIAGNOSIS — K59.04 CHRONIC IDIOPATHIC CONSTIPATION: ICD-10-CM

## 2023-04-13 RX ORDER — POLYETHYLENE GLYCOL 3350 17 G/17G
17 POWDER, FOR SOLUTION ORAL DAILY
Qty: 510 G | Refills: 1 | Status: SHIPPED | OUTPATIENT
Start: 2023-04-13

## 2023-04-13 NOTE — TELEPHONE ENCOUNTER
Pharmacy Name: [facility] [place]     Pharmacy representative phone number: [phone]    What medication are you calling in regards to: Polyethylene Glycol 1000 powder     What question does the pharmacy have:NEEDING CLARIFICATION IF THIS WAS MEANT TO COME OVER AS A MIRALAX INSTEAD OF BULK POWDER. PLEASE CONTACT PHARMACY

## 2023-04-19 ENCOUNTER — OFFICE VISIT (OUTPATIENT)
Dept: BARIATRICS/WEIGHT MGMT | Facility: CLINIC | Age: 63
End: 2023-04-19
Payer: COMMERCIAL

## 2023-04-19 VITALS — BODY MASS INDEX: 35.12 KG/M2 | WEIGHT: 223.8 LBS | HEIGHT: 67 IN

## 2023-04-19 DIAGNOSIS — E66.9 OBESITY, CLASS II, BMI 35-39.9: Primary | ICD-10-CM

## 2023-05-17 ENCOUNTER — TELEPHONE (OUTPATIENT)
Dept: FAMILY MEDICINE CLINIC | Facility: CLINIC | Age: 63
End: 2023-05-17

## 2023-05-17 NOTE — TELEPHONE ENCOUNTER
Mrs Palomo called and requested Dr bates's staff to fax a six month f/u imaging of the right breast to [facility] [phone].

## 2023-05-18 DIAGNOSIS — N61.1 ABSCESS OF RIGHT BREAST: Primary | ICD-10-CM

## 2023-05-30 ENCOUNTER — TELEPHONE (OUTPATIENT)
Dept: FAMILY MEDICINE CLINIC | Facility: CLINIC | Age: 63
End: 2023-05-30

## 2023-05-30 DIAGNOSIS — Z00.01 ENCOUNTER FOR GENERAL ADULT MEDICAL EXAMINATION WITH ABNORMAL FINDINGS: ICD-10-CM

## 2023-05-30 DIAGNOSIS — E03.9 ACQUIRED HYPOTHYROIDISM: ICD-10-CM

## 2023-05-30 DIAGNOSIS — Z13.220 SCREENING FOR HYPERLIPIDEMIA: ICD-10-CM

## 2023-05-30 DIAGNOSIS — E66.01 CLASS 2 SEVERE OBESITY DUE TO EXCESS CALORIES WITH SERIOUS COMORBIDITY AND BODY MASS INDEX (BMI) OF 35.0 TO 35.9 IN ADULT: ICD-10-CM

## 2023-05-30 DIAGNOSIS — I10 ESSENTIAL HYPERTENSION: Primary | ICD-10-CM

## 2023-05-30 DIAGNOSIS — E55.9 VITAMIN D DEFICIENCY: ICD-10-CM

## 2023-05-30 NOTE — TELEPHONE ENCOUNTER
----- Message from Tirso Palomo sent at 5/29/2023  7:56 AM EDT -----  Regarding: Need Annual Lab Orders  Contact: [phone]  Dr Martinez, can you add Annual Lab orders for me. You had advised me to notify you a week ahead of appt.My Annual is June 5th.  Have a great day    Thanks   Angela Palomo

## 2023-06-01 LAB
25(OH)D3+25(OH)D2 SERPL-MCNC: 34.7 NG/ML (ref 30–100)
ALBUMIN SERPL-MCNC: 4.4 G/DL (ref 3.8–4.8)
ALBUMIN/GLOB SERPL: 1.8 {RATIO} (ref 1.2–2.2)
ALP SERPL-CCNC: 90 IU/L (ref 44–121)
ALT SERPL-CCNC: 12 IU/L (ref 0–32)
AST SERPL-CCNC: 17 IU/L (ref 0–40)
BASOPHILS # BLD AUTO: 0 X10E3/UL (ref 0–0.2)
BASOPHILS NFR BLD AUTO: 0 %
BILIRUB SERPL-MCNC: 0.4 MG/DL (ref 0–1.2)
BUN SERPL-MCNC: 11 MG/DL (ref 8–27)
BUN/CREAT SERPL: 12 (ref 12–28)
CALCIUM SERPL-MCNC: 10.2 MG/DL (ref 8.7–10.3)
CHLORIDE SERPL-SCNC: 105 MMOL/L (ref 96–106)
CHOLEST SERPL-MCNC: 193 MG/DL (ref 100–199)
CHOLEST/HDLC SERPL: 3.6 RATIO (ref 0–4.4)
CO2 SERPL-SCNC: 23 MMOL/L (ref 20–29)
CREAT SERPL-MCNC: 0.9 MG/DL (ref 0.57–1)
EGFRCR SERPLBLD CKD-EPI 2021: 71 ML/MIN/1.73
EOSINOPHIL # BLD AUTO: 0.2 X10E3/UL (ref 0–0.4)
EOSINOPHIL NFR BLD AUTO: 2 %
ERYTHROCYTE [DISTWIDTH] IN BLOOD BY AUTOMATED COUNT: 14.3 % (ref 11.7–15.4)
GLOBULIN SER CALC-MCNC: 2.4 G/DL (ref 1.5–4.5)
GLUCOSE SERPL-MCNC: 89 MG/DL (ref 70–99)
HCT VFR BLD AUTO: 40.9 % (ref 34–46.6)
HDLC SERPL-MCNC: 54 MG/DL
HGB BLD-MCNC: 13.5 G/DL (ref 11.1–15.9)
IMM GRANULOCYTES # BLD AUTO: 0 X10E3/UL (ref 0–0.1)
IMM GRANULOCYTES NFR BLD AUTO: 0 %
LDLC SERPL CALC-MCNC: 114 MG/DL (ref 0–99)
LYMPHOCYTES # BLD AUTO: 3.7 X10E3/UL (ref 0.7–3.1)
LYMPHOCYTES NFR BLD AUTO: 35 %
MCH RBC QN AUTO: 28.5 PG (ref 26.6–33)
MCHC RBC AUTO-ENTMCNC: 33 G/DL (ref 31.5–35.7)
MCV RBC AUTO: 87 FL (ref 79–97)
MONOCYTES # BLD AUTO: 0.8 X10E3/UL (ref 0.1–0.9)
MONOCYTES NFR BLD AUTO: 8 %
NEUTROPHILS # BLD AUTO: 5.9 X10E3/UL (ref 1.4–7)
NEUTROPHILS NFR BLD AUTO: 55 %
PLATELET # BLD AUTO: 333 X10E3/UL (ref 150–450)
POTASSIUM SERPL-SCNC: 4.9 MMOL/L (ref 3.5–5.2)
PROT SERPL-MCNC: 6.8 G/DL (ref 6–8.5)
RBC # BLD AUTO: 4.73 X10E6/UL (ref 3.77–5.28)
SODIUM SERPL-SCNC: 141 MMOL/L (ref 134–144)
TRIGL SERPL-MCNC: 141 MG/DL (ref 0–149)
TSH SERPL DL<=0.005 MIU/L-ACNC: 2.36 UIU/ML (ref 0.45–4.5)
VLDLC SERPL CALC-MCNC: 25 MG/DL (ref 5–40)
WBC # BLD AUTO: 10.6 X10E3/UL (ref 3.4–10.8)

## 2023-06-01 NOTE — PROGRESS NOTES
"  Chief Complaint   Patient presents with   • Annual Exam   • Hypertension   • Hyperlipidemia   • Hypothyroidism         Subjective   Tirso Palomo is a 62 y.o. female here for a Annual Visit.     Last Physical Exam: 06/02/22 Previous physical was performed by  Miryam Martinez MD  General Health:good  Contraceptive History:none  Nutrition:in general, a \"healthy\" diet    Exercise Level:irregularly, running   Sleep:fairly well  Hours of Sleep:8  Libido:fair  Self Skin Exam: monthly  Monthly Breast Self Exam:Performs monthly self breast exam    Pap Smear:  Last Completed Pap Smear          PAP SMEAR (Every 3 Years) Next due on 8/20/2023 08/20/2020  Patient-Reported (Performed Externally)    08/20/2020  SCANNED - PAP SMEAR             Findings on last pap: approximate date 08/20/20 and was normal}    Mammogram:   Last Completed Mammogram          Ordered - MAMMOGRAM (Every 2 Years) Ordered on 5/18/2023 06/03/2022  SCANNED - MAMMO    06/03/2022  SCANNED - MAMMO    01/12/2022  SCANNED - MAMMO    11/23/2021  SCANNED - MAMMO    05/20/2021  SCANNED - MAMMO    Only the first 5 history entries have been loaded, but more history exists.             was done on approximately 06/03/2022 and the result was: Birads I (Normal).    Bone Dexa scan: On 03/19/19 and results were Normal    Colonoscopy:   Last Completed Colonoscopy          COLORECTAL CANCER SCREENING (COLONOSCOPY - Every 10 Years) Next due on 12/6/2030 12/06/2020  COLONOSCOPY (Done)             Last colonoscopy was 2018, repeat in 5 years, patient scheduled.    I personally reviewed and updated the patient's allergies, medications, problem list, and past medical, surgical, social, and family history.     Allergies:  Allergies   Allergen Reactions   • Benadryl [Diphenhydramine] Other (See Comments)     Makes her too sleepy   • Lisinopril Cough   • Topamax [Topiramate] GI Intolerance   • Amlodipine Palpitations       Social History:  Social History "     Socioeconomic History   • Marital status:    Tobacco Use   • Smoking status: Former     Packs/day: 0.25     Types: Cigarettes     Start date: 1990     Quit date: 2010     Years since quittin.8     Passive exposure: Past   • Smokeless tobacco: Never   • Tobacco comments:     Quit cold turkey   Vaping Use   • Vaping Use: Never used   Substance and Sexual Activity   • Alcohol use: No   • Drug use: No   • Sexual activity: Yes     Partners: Male     Birth control/protection: Post-menopausal       Family History:  Family History   Problem Relation Age of Onset   • Cancer Mother         breast   • Hypertension Father    • Heart attack Father    • Cancer Father         prostate   • Obesity Brother    • Hypertension Brother    • Arthritis Brother    • Breast cancer Maternal Aunt    • Heart disease Maternal Grandmother    • Diabetes Maternal Grandmother    • Obesity Maternal Grandfather    • Stroke Maternal Grandfather    • Hypertension Maternal Grandfather    • Cancer Paternal Grandmother    • Stroke Paternal Grandfather    • Heart attack Paternal Grandfather    • Hypertension Paternal Grandfather        Past Medical History :  Active Ambulatory Problems     Diagnosis Date Noted   • Chronic fatigue 2019   • Essential hypertension 2019   • Snoring 2019   • Hemorrhoids 2019   • Multiple joint pain 2019   • Acquired hypothyroidism 2019   • Screening mammogram, encounter for 2021   • Screening for cervical cancer 2021   • Screening for colon cancer 2021   • Screening for hyperlipidemia 2021   • Encounter for general adult medical examination with abnormal findings 2021   • Vitamin D deficiency 10/07/2013   • Urinary incontinence 2022   • Stress incontinence of urine 2022   • Spondylosis of lumbosacral spine without myelopathy 2017   • Class 1 obesity due to excess calories with serious comorbidity and body mass index (BMI)  of 34.0 to 34.9 in adult 08/13/2015   • Menopause present 06/02/2022   • Hypermobility of urethra 06/02/2022   • History of colonic polyps 06/02/2022   • Female bladder prolapse 06/02/2022   • Hormone replacement therapy 06/02/2022   • Abscess of right breast 10/24/2022   • Normal pelvic exam 07/26/2022   • Epidermal inclusion cyst 11/23/2022   • Status post sleeve gastrectomy 03/30/2023   • Chronic idiopathic constipation 03/30/2023     Resolved Ambulatory Problems     Diagnosis Date Noted   • Obesity, Class II, BMI 35-39.9 02/02/2023   • Obesity, Class III, BMI 40-49.9 (morbid obesity) 03/01/2023     Past Medical History:   Diagnosis Date   • Allergic    • Arthritis    • Elevated cholesterol    • Hypertension    • Hypothyroidism    • Insomnia    • Sciatica        Medication List:    Current Outpatient Medications:   •  Apoaequorin (Prevagen) 10 MG capsule, Take 1 capsule by mouth Daily., Disp: , Rfl:   •  famotidine (PEPCID) 10 MG tablet, Take 1 tablet by mouth 2 (Two) Times a Day., Disp: , Rfl:   •  levothyroxine (SYNTHROID, LEVOTHROID) 75 MCG tablet, TAKE 1 TABLET BY MOUTH EVERY DAY, Disp: 90 tablet, Rfl: 1  •  metoprolol succinate XL (TOPROL-XL) 50 MG 24 hr tablet, Take 1 tablet by mouth Daily., Disp: 90 tablet, Rfl: 3  •  Minoxidil 5 % foam, Apply 1 application topically As Needed., Disp: , Rfl:   •  Misc Natural Products (COLON CLEANSER PO), Take  by mouth. HOLD 5-7  days before surgery, Disp: , Rfl:   •  omeprazole (priLOSEC) 40 MG capsule, Take 1 capsule by mouth Daily., Disp: 30 capsule, Rfl: 6  •  polyethylene glycol (MiraLax) 17 GM/SCOOP powder, mix 1 capful (17 grams) in liquid and drink by mouth daily, Disp: 510 g, Rfl: 1  •  spironolactone (ALDACTONE) 100 MG tablet, Take 1 tablet by mouth 2 (Two) Times a Day. HOLD DOS, Disp: , Rfl:   •  ursodiol (ACTIGALL) 250 MG tablet, Take 1 tablet by mouth 2 (Two) Times a Day for 30 days., Disp: 60 tablet, Rfl: 5    Past Surgical History:  Past Surgical History:  "  Procedure Laterality Date   • BREAST SURGERY Right 2012    biopsy   • COLONOSCOPY  2018   • ENDOSCOPY N/A 02/09/2023    Procedure: ESOPHAGOGASTRODUODENOSCOPY with gastric biopsy;  Surgeon: Yue Jones MD;  Location: [facility] ENDOSCOPY;  Service: General;  Laterality: N/A;  normal   • GASTRIC SLEEVE LAPAROSCOPIC N/A 3/16/2023    Procedure: GASTRIC SLEEVE LAPAROSCOPIC WITH DAVINCI ROBOT; HIATAL HERNIA REPAIR;  Surgeon: uYe Jones MD;  Location: [facility] MAIN OR;  Service: Robotics - DaVinci;  Laterality: N/A;   • TOENAIL EXCISION      01/2022   • TUBAL ABDOMINAL LIGATION  1993       Depression Screen:       3/30/2023     3:47 PM   PHQ-2/PHQ-9 Depression Screening   Little Interest or Pleasure in Doing Things 0-->not at all   Feeling Down, Depressed or Hopeless 0-->not at all   PHQ-9: Brief Depression Severity Measure Score 0       Fall Risk Screen:  TEE Fall Risk Assessment has not been completed.        Physical Exam:  Vital Signs:  Visit Vitals  /82 (BP Location: Right arm, Patient Position: Sitting, Cuff Size: Adult)   Pulse 75   Temp 97.3 °F (36.3 °C) (Temporal)   Resp 18   Ht 168.9 cm (66.5\")   Wt 99 kg (218 lb 3.2 oz)   LMP  (LMP Unknown)   SpO2 98% Comment: room air   BMI 34.69 kg/m²       Body mass index is 34.69 kg/m².      Result Review :                  Assessment and Plan:  Problem List Items Addressed This Visit        Cardiac and Vasculature    Essential hypertension    Current Assessment & Plan     Hypertension is unchanged.  Continue current treatment regimen.  Dietary sodium restriction.  Weight loss.  Blood pressure will be reassessed in 3 months.            Endocrine and Metabolic    Acquired hypothyroidism    Class 1 obesity due to excess calories with serious comorbidity and body mass index (BMI) of 34.0 to 34.9 in adult    Current Assessment & Plan     Patient's (Body mass index is 34.69 kg/m².) indicates that they are obese (BMI >30) with health conditions that include hypertension . " Weight is improving with lifestyle modifications. BMI  is above average; BMI management plan is completed. We discussed portion control and increasing exercise.             Health Encounters    Encounter for general adult medical examination with abnormal findings - Primary    Relevant Orders    POCT urinalysis dipstick, manual (Completed)       Class 2 Severe Obesity (BMI >=35 and <=39.9). Obesity-related health conditions include the following: none. Obesity is improving with lifestyle modifications. BMI is is above average; BMI management plan is completed. We discussed low calorie, low carb based diet program, portion control, and increasing exercise.       An After Visit Summary and PPPS were given to the patient.       Discussed injury prevention, diet and exercise, safe sexual practices, and screening for common diseases. Encouraged use of sunscreen and seatbelts. Discussed timing of  cervical cancer screening. Encouraged monthly self-breast exams, yearly clinical breast exams, and discussed timing of mammograms. Avoidance of tobacco encouraged. Limitation or avoidance of alcohol encouraged. Recommend yearly dental and eye exams. Also discussed monitoring of blood pressure, lipids.         +++++E/M portion medically necessary secondary to new or uncontrolled chronic problem+++++++    Subjective   Tirso Palomo is here for:    Chief Complaint   Patient presents with   • Annual Exam   • Hypertension   • Hyperlipidemia   • Hypothyroidism       Hypertension  This is a chronic problem. The current episode started more than 1 year ago. Pertinent negatives include no chest pain, headaches, malaise/fatigue, palpitations or shortness of breath. Risk factors for coronary artery disease include post-menopausal state, obesity and family history. Current antihypertension treatment includes beta blockers. The current treatment provides moderate improvement.   Hypothyroidism  This is a chronic problem. The current  episode started more than 1 year ago. Pertinent negatives include no chest pain, fatigue, headaches or weakness. Treatments tried: levothyroxine 75 MCG  The treatment provided moderate relief.       Physical Exam:  Review of Systems   Constitutional:  Negative for fatigue and malaise/fatigue.   Respiratory:  Negative for shortness of breath.    Cardiovascular:  Negative for chest pain and palpitations.   Neurological:  Negative for weakness.      Physical Exam  Vitals and nursing note reviewed.   Constitutional:       General: She is not in acute distress.     Appearance: She is well-developed. She is not diaphoretic.   HENT:      Head: Normocephalic and atraumatic.      Right Ear: Tympanic membrane and external ear normal.      Left Ear: Tympanic membrane and external ear normal.      Nose: Nose normal.      Mouth/Throat:      Mouth: Mucous membranes are moist.      Pharynx: No oropharyngeal exudate.   Eyes:      General: No scleral icterus.        Right eye: No discharge.         Left eye: No discharge.      Conjunctiva/sclera: Conjunctivae normal.      Pupils: Pupils are equal, round, and reactive to light.   Neck:      Thyroid: No thyromegaly.      Trachea: No tracheal deviation.   Cardiovascular:      Rate and Rhythm: Normal rate and regular rhythm.      Heart sounds: Normal heart sounds. No murmur heard.    No friction rub. No gallop.   Pulmonary:      Effort: Pulmonary effort is normal. No respiratory distress.      Breath sounds: Normal breath sounds. No stridor. No wheezing or rales.   Chest:   Breasts:     Right: Normal. No swelling, bleeding, inverted nipple, mass, nipple discharge, skin change or tenderness.      Left: Normal. No swelling, bleeding, inverted nipple, mass, nipple discharge, skin change or tenderness.   Abdominal:      General: Bowel sounds are normal. There is no distension.      Palpations: Abdomen is soft. There is no mass.      Tenderness: There is no abdominal tenderness. There is no  guarding or rebound.   Genitourinary:     Comments: declined  Musculoskeletal:         General: No tenderness or deformity. Normal range of motion.      Cervical back: Normal range of motion and neck supple.   Lymphadenopathy:      Cervical: No cervical adenopathy.      Upper Body:      Right upper body: No axillary adenopathy.      Left upper body: No axillary adenopathy.   Skin:     General: Skin is warm and dry.      Capillary Refill: Capillary refill takes less than 2 seconds.      Coloration: Skin is not pale.      Findings: No erythema or rash.   Neurological:      Mental Status: She is alert and oriented to person, place, and time.      Cranial Nerves: No cranial nerve deficit.      Sensory: No sensory deficit.      Motor: No tremor, atrophy or abnormal muscle tone.      Coordination: Coordination normal.      Gait: Gait normal.      Deep Tendon Reflexes: Reflexes are normal and symmetric. Reflexes normal.   Psychiatric:         Behavior: Behavior normal.         Thought Content: Thought content normal.         Cognition and Memory: Memory is not impaired. She does not exhibit impaired recent memory or impaired remote memory.         Judgment: Judgment normal.       Assessment and Plan:  Problem List Items Addressed This Visit        Cardiac and Vasculature    Essential hypertension    Current Assessment & Plan     Hypertension is unchanged.  Continue current treatment regimen.  Dietary sodium restriction.  Weight loss.  Blood pressure will be reassessed in 3 months.            Endocrine and Metabolic    Acquired hypothyroidism    Class 1 obesity due to excess calories with serious comorbidity and body mass index (BMI) of 34.0 to 34.9 in adult    Current Assessment & Plan     Patient's (Body mass index is 34.69 kg/m².) indicates that they are obese (BMI >30) with health conditions that include hypertension . Weight is improving with lifestyle modifications. BMI  is above average; BMI management plan is  completed. We discussed portion control and increasing exercise.             Health Encounters    Encounter for general adult medical examination with abnormal findings - Primary    Relevant Orders    POCT urinalysis dipstick, manual (Completed)

## 2023-06-02 RX ORDER — OMEPRAZOLE 40 MG/1
40 CAPSULE, DELAYED RELEASE ORAL DAILY
Qty: 30 CAPSULE | Refills: 6 | Status: SHIPPED | OUTPATIENT
Start: 2023-06-02

## 2023-06-04 DIAGNOSIS — E03.9 ACQUIRED HYPOTHYROIDISM: ICD-10-CM

## 2023-06-04 RX ORDER — LEVOTHYROXINE SODIUM 0.07 MG/1
TABLET ORAL
Qty: 90 TABLET | Refills: 1 | Status: SHIPPED | OUTPATIENT
Start: 2023-06-04

## 2023-06-05 ENCOUNTER — OFFICE VISIT (OUTPATIENT)
Dept: FAMILY MEDICINE CLINIC | Facility: CLINIC | Age: 63
End: 2023-06-05
Payer: COMMERCIAL

## 2023-06-05 VITALS
TEMPERATURE: 97.3 F | BODY MASS INDEX: 34.25 KG/M2 | RESPIRATION RATE: 18 BRPM | SYSTOLIC BLOOD PRESSURE: 120 MMHG | WEIGHT: 218.2 LBS | HEIGHT: 67 IN | OXYGEN SATURATION: 98 % | DIASTOLIC BLOOD PRESSURE: 82 MMHG | HEART RATE: 75 BPM

## 2023-06-05 DIAGNOSIS — Z00.01 ENCOUNTER FOR GENERAL ADULT MEDICAL EXAMINATION WITH ABNORMAL FINDINGS: Primary | ICD-10-CM

## 2023-06-05 DIAGNOSIS — E66.09 CLASS 1 OBESITY DUE TO EXCESS CALORIES WITH SERIOUS COMORBIDITY AND BODY MASS INDEX (BMI) OF 34.0 TO 34.9 IN ADULT: ICD-10-CM

## 2023-06-05 DIAGNOSIS — I10 ESSENTIAL HYPERTENSION: ICD-10-CM

## 2023-06-05 DIAGNOSIS — E03.9 ACQUIRED HYPOTHYROIDISM: ICD-10-CM

## 2023-06-05 LAB
BILIRUB BLD-MCNC: NEGATIVE MG/DL
CLARITY, POC: CLEAR
COLOR UR: YELLOW
GLUCOSE UR STRIP-MCNC: NEGATIVE MG/DL
KETONES UR QL: NEGATIVE
LEUKOCYTE EST, POC: NEGATIVE
NITRITE UR-MCNC: NEGATIVE MG/ML
PH UR: 6 [PH] (ref 5–8)
PROT UR STRIP-MCNC: NEGATIVE MG/DL
RBC # UR STRIP: NEGATIVE /UL
SP GR UR: 1.02 (ref 1–1.03)
UROBILINOGEN UR QL: NORMAL

## 2023-06-05 PROCEDURE — 99396 PREV VISIT EST AGE 40-64: CPT | Performed by: FAMILY MEDICINE

## 2023-06-05 PROCEDURE — 81002 URINALYSIS NONAUTO W/O SCOPE: CPT | Performed by: FAMILY MEDICINE

## 2023-06-05 RX ORDER — FAMOTIDINE 10 MG
10 TABLET ORAL 2 TIMES DAILY
COMMUNITY

## 2023-06-05 NOTE — ASSESSMENT & PLAN NOTE
Patient's (Body mass index is 34.69 kg/m².) indicates that they are obese (BMI >30) with health conditions that include hypertension . Weight is improving with lifestyle modifications. BMI  is above average; BMI management plan is completed. We discussed portion control and increasing exercise.

## 2023-06-16 ENCOUNTER — TELEPHONE (OUTPATIENT)
Dept: FAMILY MEDICINE CLINIC | Facility: CLINIC | Age: 63
End: 2023-06-16
Payer: COMMERCIAL

## 2023-06-16 NOTE — TELEPHONE ENCOUNTER
PLEASE FAX ORDER FOR THE ULTRA SOUND RT BREAST THAT WAS DONE ON 06/15/23 TO FAX# [phone] TO [facility] IN [place]. THANKS YOU

## 2023-07-05 PROBLEM — Z87.891 HISTORY OF TOBACCO USE: Status: ACTIVE | Noted: 2023-07-05

## 2023-07-25 DIAGNOSIS — K59.04 CHRONIC IDIOPATHIC CONSTIPATION: ICD-10-CM

## 2023-07-25 RX ORDER — POLYETHYLENE GLYCOL 3350 17 G/17G
17 POWDER, FOR SOLUTION ORAL DAILY
Qty: 510 G | Refills: 1 | Status: SHIPPED | OUTPATIENT
Start: 2023-07-25

## 2023-08-01 DIAGNOSIS — E03.9 ACQUIRED HYPOTHYROIDISM: ICD-10-CM

## 2023-08-03 RX ORDER — LEVOTHYROXINE SODIUM 0.07 MG/1
75 TABLET ORAL DAILY
Qty: 90 TABLET | Refills: 1 | Status: SHIPPED | OUTPATIENT
Start: 2023-08-03

## 2023-09-06 ENCOUNTER — TELEPHONE (OUTPATIENT)
Dept: BARIATRICS/WEIGHT MGMT | Facility: CLINIC | Age: 63
End: 2023-09-06
Payer: COMMERCIAL

## 2023-09-25 DIAGNOSIS — M25.512 ACUTE PAIN OF LEFT SHOULDER: ICD-10-CM

## 2023-09-26 RX ORDER — CYCLOBENZAPRINE HCL 10 MG
5-10 TABLET ORAL NIGHTLY PRN
Qty: 30 TABLET | Refills: 2 | OUTPATIENT
Start: 2023-09-26

## 2023-09-26 NOTE — PROGRESS NOTES
Subjective   Tirso Palomo is a 62 y.o. female. Presents to [facility]    Chief Complaint   Patient presents with    vaginal dryness        History of Present Illness  Patient is here today to discuss vaginal dryness. Her GYN , Dr. Taveras, used to give her a hormone implant every 3 months for the dryness. He retired last year. She is wanting Dr. Martinez to take over her hormone treatment. She is wanting to try something other than the implants.      I personally reviewed and updated the patient's allergies, medications, problem list, and past medical, surgical, social, and family history. I have reviewed and confirmed the accuracy of the History of Present Illness and Review of Symptoms as documented by the MA/LPN/RN. Miryam Martinez MD    Allergies:  Allergies   Allergen Reactions    Benadryl [Diphenhydramine] Other (See Comments)     Makes her too sleepy    Lisinopril Cough    Topamax [Topiramate] GI Intolerance    Actigall [Ursodiol] Other (See Comments)     Burning in lining of stomach     Amlodipine Palpitations       Social History:  Social History     Socioeconomic History    Marital status:    Tobacco Use    Smoking status: Former     Packs/day: 0.25     Years: 30.00     Pack years: 7.50     Types: Cigarettes     Start date: 1990     Quit date: 2010     Years since quittin.1     Passive exposure: Past    Smokeless tobacco: Never    Tobacco comments:     Quit cold turkey   Vaping Use    Vaping Use: Never used   Substance and Sexual Activity    Alcohol use: No    Drug use: No    Sexual activity: Yes     Partners: Male     Birth control/protection: Post-menopausal       Family History:  Family History   Problem Relation Age of Onset    Cancer Mother         breast    Hypertension Father     Heart attack Father     Cancer Father         prostate    Obesity Brother     Hypertension Brother     Arthritis Brother     Breast cancer Maternal Aunt     Heart disease Maternal  Grandmother     Diabetes Maternal Grandmother     Obesity Maternal Grandfather     Stroke Maternal Grandfather     Hypertension Maternal Grandfather     Cancer Paternal Grandmother     Stroke Paternal Grandfather     Heart attack Paternal Grandfather     Hypertension Paternal Grandfather        Past Medical History :  Patient Active Problem List   Diagnosis    Chronic fatigue    Essential hypertension    Snoring    Hemorrhoids    Multiple joint pain    Acquired hypothyroidism    Screening mammogram, encounter for    Screening for cervical cancer    Screening for colon cancer    Screening for hyperlipidemia    Encounter for general adult medical examination with abnormal findings    Vitamin D deficiency    Urinary incontinence    Stress incontinence of urine    Spondylosis of lumbosacral spine without myelopathy    Class 1 obesity due to excess calories with serious comorbidity and body mass index (BMI) of 34.0 to 34.9 in adult    Menopause present    Hypermobility of urethra    History of colonic polyps    Female bladder prolapse    Hormone replacement therapy    Abscess of right breast    Normal pelvic exam    Epidermal inclusion cyst    Status post sleeve gastrectomy    Chronic idiopathic constipation    History of tobacco use    Vaginal dryness    Alopecia    Change in bowel habit    Family history of colon cancer    H/O bariatric surgery       Medication List:    Current Outpatient Medications:     Apoaequorin (Prevagen) 10 MG capsule, Take 1 capsule by mouth Daily., Disp: , Rfl:     cholecalciferol (VITAMIN D3) 25 MCG (1000 UT) tablet, Take 1 tablet by mouth Daily., Disp: , Rfl:     famotidine (PEPCID) 10 MG tablet, Take 1 tablet by mouth 2 (Two) Times a Day., Disp: , Rfl:     ketoconazole (NIZORAL) 2 % shampoo, Apply  topically to the appropriate area as directed 2 (Two) Times a Week., Disp: , Rfl:     levothyroxine (SYNTHROID, LEVOTHROID) 75 MCG tablet, Take 1 tablet by mouth Daily., Disp: 90 tablet, Rfl: 1    " metoprolol succinate XL (TOPROL-XL) 50 MG 24 hr tablet, Take 1 tablet by mouth Daily., Disp: 90 tablet, Rfl: 3    Minoxidil 5 % foam, Apply 1 application topically As Needed., Disp: , Rfl:     Misc Natural Products (COLON CLEANSER PO), Take  by mouth. HOLD 5-7  days before surgery, Disp: , Rfl:     omeprazole (priLOSEC) 40 MG capsule, Take 1 capsule by mouth Daily., Disp: 30 capsule, Rfl: 6    polyethylene glycol (MiraLax) 17 GM/SCOOP powder, mix 1 capful (17 grams) in liquid and drink by mouth daily, Disp: 510 g, Rfl: 1    spironolactone (ALDACTONE) 100 MG tablet, Take 1 tablet by mouth 2 (Two) Times a Day. HOLD DOS, Disp: , Rfl:     Estrogens Conjugated (PREMARIN) 0.625 MG/GM vaginal cream, Insert  into the vagina Daily. Use as directed 3 times a week for first week then start 2 times a week intravaginally. Only use for 2 weeks out of the month, Disp: 42.5 g, Rfl: 12    Past Surgical History:  Past Surgical History:   Procedure Laterality Date    BREAST SURGERY Right 2012    biopsy    COLONOSCOPY  2018    ENDOSCOPY N/A 02/09/2023    Procedure: ESOPHAGOGASTRODUODENOSCOPY with gastric biopsy;  Surgeon: Yue Jones MD;  Location: [facility] ENDOSCOPY;  Service: General;  Laterality: N/A;  normal    GASTRIC SLEEVE LAPAROSCOPIC N/A 3/16/2023    Procedure: GASTRIC SLEEVE LAPAROSCOPIC WITH DAVINCI ROBOT; HIATAL HERNIA REPAIR;  Surgeon: Yue Jones MD;  Location: [facility] MAIN OR;  Service: Robotics - DaVinci;  Laterality: N/A;    TOENAIL EXCISION      01/2022    TUBAL ABDOMINAL LIGATION  1993         Physical Exam:      Vital Signs:    Vitals:    09/29/23 0950   BP: 130/80   Pulse: 71   Resp: 18   Temp: 97.1 °F (36.2 °C)   SpO2: 98%        /80 (BP Location: Right arm, Patient Position: Sitting, Cuff Size: Adult)   Pulse 71   Temp 97.1 °F (36.2 °C) (Temporal)   Resp 18   Ht 168.9 cm (66.5\")   Wt 98.2 kg (216 lb 9.6 oz)   LMP  (LMP Unknown)   SpO2 98% Comment: room air  BMI 34.44 kg/m²     Wt Readings from Last " 3 Encounters:   09/29/23 98.2 kg (216 lb 9.6 oz)   07/05/23 98.9 kg (218 lb)   06/21/23 98 kg (216 lb)       Result Review :                Physical Exam  Vitals reviewed.   Constitutional:       Appearance: Normal appearance. She is well-developed.   HENT:      Head: Normocephalic and atraumatic.   Eyes:      General:         Right eye: No discharge.         Left eye: No discharge.   Cardiovascular:      Rate and Rhythm: Normal rate and regular rhythm.      Heart sounds: Normal heart sounds. No murmur heard.    No friction rub. No gallop.   Pulmonary:      Effort: Pulmonary effort is normal. No respiratory distress.      Breath sounds: Normal breath sounds. No wheezing or rales.   Genitourinary:     Vagina: No tenderness.      Cervix: No erythema.      Comments: Vaginal atrophy  Skin:     General: Skin is warm and dry.      Findings: No rash.   Neurological:      Mental Status: She is alert and oriented to person, place, and time.      Coordination: Coordination normal.      Gait: Gait normal.   Psychiatric:         Behavior: Behavior is cooperative.       Assessment and Plan:  Problems Addressed this Visit          Endocrine and Metabolic    Class 1 obesity due to excess calories with serious comorbidity and body mass index (BMI) of 34.0 to 34.9 in adult     Patient's (Body mass index is 34.44 kg/m².) indicates that they are obese (BMI >30) with health conditions that include hypertension . Weight is improving with lifestyle modifications. BMI  is above average; BMI management plan is completed. We discussed portion control and increasing exercise.             Genitourinary and Reproductive     Vaginal dryness - Primary     Chronic  Worse    Start premarin  Diagnosis, treatment and and course discussed. Potential side effects discussed. Return if there is worsening or persistence of symptoms.              Relevant Medications    Estrogens Conjugated (PREMARIN) 0.625 MG/GM vaginal cream       Tobacco    History of  tobacco use     Diagnoses         Codes Comments    Vaginal dryness    -  Primary ICD-10-CM: N89.8  ICD-9-CM: 625.8     History of tobacco use     ICD-10-CM: Z87.891  ICD-9-CM: V15.82     Class 1 obesity due to excess calories with serious comorbidity and body mass index (BMI) of 34.0 to 34.9 in adult     ICD-10-CM: E66.09, Z68.34  ICD-9-CM: 278.00, V85.34                     An After Visit Summary and PPPS were given to the patient.

## 2023-09-29 ENCOUNTER — OFFICE VISIT (OUTPATIENT)
Dept: FAMILY MEDICINE CLINIC | Facility: CLINIC | Age: 63
End: 2023-09-29
Payer: COMMERCIAL

## 2023-09-29 VITALS
RESPIRATION RATE: 18 BRPM | WEIGHT: 216.6 LBS | HEART RATE: 71 BPM | OXYGEN SATURATION: 98 % | BODY MASS INDEX: 34 KG/M2 | SYSTOLIC BLOOD PRESSURE: 130 MMHG | HEIGHT: 67 IN | DIASTOLIC BLOOD PRESSURE: 80 MMHG | TEMPERATURE: 97.1 F

## 2023-09-29 DIAGNOSIS — E66.09 CLASS 1 OBESITY DUE TO EXCESS CALORIES WITH SERIOUS COMORBIDITY AND BODY MASS INDEX (BMI) OF 34.0 TO 34.9 IN ADULT: ICD-10-CM

## 2023-09-29 DIAGNOSIS — Z87.891 HISTORY OF TOBACCO USE: ICD-10-CM

## 2023-09-29 DIAGNOSIS — N89.8 VAGINAL DRYNESS: Primary | ICD-10-CM

## 2023-09-29 PROBLEM — Z98.84 H/O BARIATRIC SURGERY: Status: ACTIVE | Noted: 2023-08-22

## 2023-09-29 PROBLEM — Z80.0 FAMILY HISTORY OF COLON CANCER: Status: ACTIVE | Noted: 2023-08-22

## 2023-09-29 PROBLEM — L65.9 ALOPECIA: Status: ACTIVE | Noted: 2017-06-12

## 2023-09-29 PROBLEM — R19.4 CHANGE IN BOWEL HABIT: Status: ACTIVE | Noted: 2023-09-29

## 2023-09-29 PROCEDURE — 99214 OFFICE O/P EST MOD 30 MIN: CPT | Performed by: FAMILY MEDICINE

## 2023-09-29 NOTE — ASSESSMENT & PLAN NOTE
Patient's (Body mass index is 34.44 kg/m².) indicates that they are obese (BMI >30) with health conditions that include hypertension . Weight is improving with lifestyle modifications. BMI  is above average; BMI management plan is completed. We discussed portion control and increasing exercise.

## 2023-09-30 NOTE — ASSESSMENT & PLAN NOTE
Chronic  Worse    Start premarin  Diagnosis, treatment and and course discussed. Potential side effects discussed. Return if there is worsening or persistence of symptoms.

## 2023-10-19 ENCOUNTER — OFFICE VISIT (OUTPATIENT)
Dept: BARIATRICS/WEIGHT MGMT | Facility: CLINIC | Age: 63
End: 2023-10-19
Payer: COMMERCIAL

## 2023-10-19 VITALS
SYSTOLIC BLOOD PRESSURE: 145 MMHG | HEIGHT: 67 IN | OXYGEN SATURATION: 98 % | WEIGHT: 216.8 LBS | DIASTOLIC BLOOD PRESSURE: 79 MMHG | HEART RATE: 57 BPM | BODY MASS INDEX: 34.03 KG/M2

## 2023-10-19 DIAGNOSIS — Z90.3 H/O GASTRIC SLEEVE: ICD-10-CM

## 2023-10-19 DIAGNOSIS — E66.9 OBESITY, CLASS I, BMI 30-34.9: Primary | ICD-10-CM

## 2023-10-19 PROCEDURE — 99213 OFFICE O/P EST LOW 20 MIN: CPT | Performed by: NURSE PRACTITIONER

## 2023-10-19 RX ORDER — MULTIPLE VITAMINS W/ MINERALS TAB 9MG-400MCG
1 TAB ORAL DAILY
COMMUNITY

## 2023-10-19 NOTE — PROGRESS NOTES
"[facility] SUR[facility] BARIATRIC SURGERY  [address] [place] [address]  [address] [place] [address]  Dept: [phone]  10/19/2023      Tirso Palomo.  [id number]  [id number]  1960  female    Date of last surgery: 3/16/2023Gastric Sleeve Laparoscopic With Davinci Robot; Hiatal Hernia Repair      Chief Complaint: BH Post-Op Bariatric Surgery:   Tirso Palomo is status post procedure listed above  HPI:     Wt Readings from Last 10 Encounters:   10/19/23 98.3 kg (216 lb 12.8 oz)   09/29/23 98.2 kg (216 lb 9.6 oz)   07/05/23 98.9 kg (218 lb)   06/21/23 98 kg (216 lb)   06/05/23 99 kg (218 lb 3.2 oz)   04/19/23 102 kg (223 lb 12.8 oz)   03/30/23 105 kg (231 lb 9.6 oz)   03/20/23 108 kg (237 lb 9.6 oz)   03/16/23 109 kg (239 lb 9.6 oz)   03/10/23 111 kg (245 lb 3.2 oz)        Today's weight is 98.3 kg (216 lb 12.8 oz) pounds,BMI@,HE@ has a  loss of 0 pounds since the last visit and@ weight loss since surgery is 39 pounds. The patient reports a decreased portion size and loss of appetite.      Tirso Palomo denies nausea and vomiting, having some \" burning in stomach\"- but improved overall , having some constipation but states she was having constipation before sx     Diet and Exercise: Diet history reviewed and discussed with the patient. Weight loss/gains to date discussed with the patient.     She reports eating 3 meals per day, a typical portion size of 1/2 cup, eating 1 snacks per day, drinking 8 or more 8-oz. glasses of water per day, no carbonated beverage consumption and exercising regularly.       The patient states they are eating 40 grams of protein per day.       Still having some \" burning in stomach with certain foods \"- omeprazole, not taking pepsid anymore due to \" feeling like it is not helping.\"     Having some constipation- colon cleans, miralax     Breakfast: coffee, toast , eggs, cereal   Lunch: depending on where I am, " "tuna, grilled cheese   Dinner: wraps, soup, chicken   Snacks: corn puffs, popcorn, apples, peaches , protein  bars prn  Drinks: water,   Exercise: power walking , trying to train for 5 k     No protein supplements     Nutrafil for hair loss , bariatric multi complete       Review of Systems   Constitutional:  Positive for activity change and appetite change.        Hair loss    Respiratory: Negative.     Cardiovascular: Negative.    Gastrointestinal:  Positive for constipation.        \" Burning when eating prn\"   Musculoskeletal: Negative.          Patient Active Problem List   Diagnosis    Chronic fatigue    Essential hypertension    Snoring    Hemorrhoids    Multiple joint pain    Acquired hypothyroidism    Screening mammogram, encounter for    Screening for cervical cancer    Screening for colon cancer    Screening for hyperlipidemia    Encounter for general adult medical examination with abnormal findings    Vitamin D deficiency    Urinary incontinence    Stress incontinence of urine    Spondylosis of lumbosacral spine without myelopathy    Class 1 obesity due to excess calories with serious comorbidity and body mass index (BMI) of 34.0 to 34.9 in adult    Menopause present    Hypermobility of urethra    History of colonic polyps    Female bladder prolapse    Hormone replacement therapy    Abscess of right breast    Normal pelvic exam    Epidermal inclusion cyst    Status post sleeve gastrectomy    Chronic idiopathic constipation    History of tobacco use    Vaginal dryness    Alopecia    Change in bowel habit    Family history of colon cancer    H/O bariatric surgery       Past Medical History:   Diagnosis Date    Allergic     Arthritis     Elevated cholesterol     Hypertension     Hypothyroidism     Insomnia     Sciatica      Past Surgical History:   Procedure Laterality Date    TUBAL ABDOMINAL LIGATION  1993    BREAST SURGERY Right 2012    biopsy    COLONOSCOPY  2018    ENDOSCOPY N/A 02/09/2023    Procedure: " "ESOPHAGOGASTRODUODENOSCOPY with gastric biopsy;  Surgeon: Yue Jones MD;  Location: [facility] ENDOSCOPY;  Service: General;  Laterality: N/A;  normal    GASTRIC SLEEVE LAPAROSCOPIC N/A 3/16/2023    Procedure: GASTRIC SLEEVE LAPAROSCOPIC WITH DAVINCI ROBOT; HIATAL HERNIA REPAIR;  Surgeon: Yue Jones MD;  Location: [facility] MAIN OR;  Service: Robotics - DaVinci;  Laterality: N/A;    TOENAIL EXCISION      01/2022      The following portions of the patient's history were reviewed and updated as appropriate: allergies, current medications, past medical history, past social history, past surgical history, and problem list.  Height 66.5 inches, weight 216 pounds, BMI 34.47      Physical Exam  Constitutional:       Appearance: Normal appearance. She is obese.   Pulmonary:      Effort: Pulmonary effort is normal.   Abdominal:      General: Abdomen is flat.      Palpations: Abdomen is soft.   Skin:     General: Skin is warm and dry.   Neurological:      General: No focal deficit present.      Mental Status: She is alert and oriented to person, place, and time.   Psychiatric:         Mood and Affect: Mood normal.         Behavior: Behavior normal.         Thought Content: Thought content normal.         Judgment: Judgment normal.             Assessment:   BMI 34.47, class 1 obesity, 7 m gastric sleeve / GERD/ hair loss    Post-op, the patient I doing well but she has started to hit a stall with her weight loss.  Pt is not getting enough protein in her diet a day. Encourage 60+ grams of protein in her diet a day including 1 protein shake a day. Pt is power walking now and is training for a 5 k in the spring. I encouraged pt to try and power walk with 3-5 pound weights to help with strength training. Pt is also having some hair loss but is taking nutrafil and this is helping some. She is also taking a bariatric multi complete vitamin. Pt is still also having \" burning in her chest.\" Takes omeprazole daily but stopped famotidine " after she felt like it did not help. Encourage pt to try pepsid complete and call the office if symptoms get worse and will order upper GI. She did have a hiatal hernia noted and repaired at the time of her bariatric surgery. Plan to check 6 month labs and follow up in March for 1 yr apt.     Plan:     Encouraged patient to be sure to get plenty of lean protein per day through small frequent meals all with a protein source.   Activity restrictions: none.   Recommended patient be sure to get at least 70 grams of protein per day by eating small, frequent meals all with high lean protein choices. Be sure to limit/cut back on daily carbohydrate intake. Discussed with the patient the recommended amount of water per day to intake- half of body weight in ounces. Reviewed vitamin requirements. Be sure to do routine exercise, 150 minutes per week minimum, including both cardio and strength training.     Instructions / Recommendations: dietary counseling recommended, recommended a daily protein intake of  grams, vitamin supplement(s) recommended, recommended exercising at least 150 minutes per week, behavior modifications recommended and instructed to call the office for concerns, questions, or problems.     The patient was instructed to follow up in 5 months.     The patient was counseled regarding diet and exercise/ GERD. Total time spent face to face was 20 minutes and 15 minutes was spent counseling.     Oriana Mcmillan APRESTHER  [facility]

## 2023-11-07 DIAGNOSIS — K59.04 CHRONIC IDIOPATHIC CONSTIPATION: ICD-10-CM

## 2023-11-07 RX ORDER — POLYETHYLENE GLYCOL 3350 17 G/17G
17 POWDER, FOR SOLUTION ORAL DAILY
Qty: 510 G | Refills: 1 | Status: SHIPPED | OUTPATIENT
Start: 2023-11-07

## 2023-11-20 ENCOUNTER — TELEPHONE (OUTPATIENT)
Dept: BARIATRICS/WEIGHT MGMT | Facility: CLINIC | Age: 63
End: 2023-11-20
Payer: COMMERCIAL

## 2023-11-20 NOTE — TELEPHONE ENCOUNTER
Pt called regarding stomach pain getting worse    Pt has stated Oriana told her to call back if pain kept getting worse with prescribed meds. Please advise with return call [phone]

## 2023-11-21 DIAGNOSIS — K21.9 HIATAL HERNIA WITH GERD: Primary | ICD-10-CM

## 2023-11-21 DIAGNOSIS — K44.9 HIATAL HERNIA WITH GERD: Primary | ICD-10-CM

## 2023-11-22 ENCOUNTER — TELEPHONE (OUTPATIENT)
Dept: BARIATRICS/WEIGHT MGMT | Facility: CLINIC | Age: 63
End: 2023-11-22
Payer: COMMERCIAL

## 2023-11-22 NOTE — TELEPHONE ENCOUNTER
Left vm for pt to call office     Need to change time on 11.27.23 from 12.15 to 8.15 on Dr Jones schedule

## 2023-11-27 ENCOUNTER — OFFICE VISIT (OUTPATIENT)
Dept: BARIATRICS/WEIGHT MGMT | Facility: CLINIC | Age: 63
End: 2023-11-27
Payer: COMMERCIAL

## 2023-11-27 ENCOUNTER — PREP FOR SURGERY (OUTPATIENT)
Dept: OTHER | Facility: HOSPITAL | Age: 63
End: 2023-11-27
Payer: COMMERCIAL

## 2023-11-27 VITALS
HEIGHT: 67 IN | SYSTOLIC BLOOD PRESSURE: 134 MMHG | WEIGHT: 219 LBS | DIASTOLIC BLOOD PRESSURE: 61 MMHG | HEART RATE: 51 BPM | OXYGEN SATURATION: 100 % | BODY MASS INDEX: 34.37 KG/M2

## 2023-11-27 DIAGNOSIS — E66.9 OBESITY, CLASS I, BMI 30-34.9: Primary | ICD-10-CM

## 2023-11-27 PROBLEM — E66.811 OBESITY, CLASS I, BMI 30-34.9: Status: ACTIVE | Noted: 2023-11-27

## 2023-11-27 PROCEDURE — 3075F SYST BP GE 130 - 139MM HG: CPT | Performed by: SURGERY

## 2023-11-27 PROCEDURE — 3078F DIAST BP <80 MM HG: CPT | Performed by: SURGERY

## 2023-11-27 PROCEDURE — 99213 OFFICE O/P EST LOW 20 MIN: CPT | Performed by: SURGERY

## 2023-11-27 RX ORDER — CLOBETASOL PROPIONATE 0.46 MG/ML
SOLUTION TOPICAL
COMMUNITY
Start: 2023-10-23

## 2023-11-27 RX ORDER — SODIUM CHLORIDE, SODIUM LACTATE, POTASSIUM CHLORIDE, CALCIUM CHLORIDE 600; 310; 30; 20 MG/100ML; MG/100ML; MG/100ML; MG/100ML
30 INJECTION, SOLUTION INTRAVENOUS CONTINUOUS
Status: CANCELLED | OUTPATIENT
Start: 2023-11-27

## 2023-11-27 NOTE — PROGRESS NOTES
[facility] BARIATRIC SURGERY  [address] [place] [address]  [address] [place] [address]  Dept: [phone]  11/27/2023      Tirso Palomo.  [id number]  [id number]  1960  female    Date of last surgery: 3/16/2023Gastric Sleeve Laparoscopic With Davinci Robot; Hiatal Hernia Repair      Chief Complaint: BH Post-Op Bariatric Surgery:   Tirso Palomo is status post procedure listed above  HPI:     Wt Readings from Last 10 Encounters:   11/27/23 99.3 kg (219 lb)   10/19/23 98.3 kg (216 lb 12.8 oz)   09/29/23 98.2 kg (216 lb 9.6 oz)   07/05/23 98.9 kg (218 lb)   06/21/23 98 kg (216 lb)   06/05/23 99 kg (218 lb 3.2 oz)   04/19/23 102 kg (223 lb 12.8 oz)   03/30/23 105 kg (231 lb 9.6 oz)   03/20/23 108 kg (237 lb 9.6 oz)   03/16/23 109 kg (239 lb 9.6 oz)      She has epigastric pain when eating. She had gastric sleeve with hiatal hernia repair      Review of Systems    Review of Systems   Constitutional: Negative.    HENT: Negative.    Eyes: Negative.    Respiratory: Negative.    Cardiovascular: Negative.    Gastrointestinal: Negative.    Endocrine: Negative.    Genitourinary: Negative.    Musculoskeletal: Negative.    Skin: Negative.    Allergic/Immunologic: Negative.    Neurological: Negative.    Hematological: Negative.    Psychiatric/Behavioral: Negative.    Patient Active Problem List   Diagnosis    Chronic fatigue    Essential hypertension    Snoring    Hemorrhoids    Multiple joint pain    Acquired hypothyroidism    Screening mammogram, encounter for    Screening for cervical cancer    Screening for colon cancer    Screening for hyperlipidemia    Encounter for general adult medical examination with abnormal findings    Vitamin D deficiency    Urinary incontinence    Stress incontinence of urine    Spondylosis of lumbosacral spine without myelopathy    Class 1 obesity due to excess calories with serious comorbidity and body mass  index (BMI) of 34.0 to 34.9 in adult    Menopause present    Hypermobility of urethra    History of colonic polyps    Female bladder prolapse    Hormone replacement therapy    Abscess of right breast    Normal pelvic exam    Epidermal inclusion cyst    Status post sleeve gastrectomy    Chronic idiopathic constipation    History of tobacco use    Vaginal dryness    Alopecia    Change in bowel habit    Family history of colon cancer    H/O bariatric surgery       Past Medical History:   Diagnosis Date    Allergic     Arthritis     Elevated cholesterol     Hypertension     Hypothyroidism     Insomnia     Sciatica      Past Surgical History:   Procedure Laterality Date    TUBAL ABDOMINAL LIGATION  1993    BREAST SURGERY Right 2012    biopsy    COLONOSCOPY  2018 2018 2023    ENDOSCOPY N/A 02/09/2023    Procedure: ESOPHAGOGASTRODUODENOSCOPY with gastric biopsy;  Surgeon: Yue Jones MD;  Location: [facility] ENDOSCOPY;  Service: General;  Laterality: N/A;  normal    GASTRIC SLEEVE LAPAROSCOPIC N/A 03/16/2023    Procedure: GASTRIC SLEEVE LAPAROSCOPIC WITH DAVINCI ROBOT; HIATAL HERNIA REPAIR;  Surgeon: Yue Jnoes MD;  Location: [facility] MAIN OR;  Service: Robotics - DaVinci;  Laterality: N/A;    TOENAIL EXCISION      01/2022      The following portions of the patient's history were reviewed and updated as appropriate: allergies, current medications, past family history, past medical history, past social history, past surgical history, and problem list.    Vitals:    11/27/23 1027   BP: 134/61   Pulse: 51   SpO2: 100%       Physical Exam  Awake and alert  Normal mental status  Normal pulmonary effort  Abdomen appropriate tenderness  Incisions no erythema  Extremities no tenderness or swelling      Assessment:       Dysphagia       Post-op, the patient is having dysphagia and epigastric pain.     Plan:       Will get UGI and egd. She may have hiatal hernia or possible stricture

## 2023-11-27 NOTE — H&P (VIEW-ONLY)
[facility] SUR[facility] BARIATRIC SURGERY  [address] [place] [address]  [address] [place] [address]  Dept: [phone]  11/27/2023      Tirso Palomo.  [id number]  [id number]  1960  female    Date of last surgery: 3/16/2023Gastric Sleeve Laparoscopic With Davinci Robot; Hiatal Hernia Repair      Chief Complaint: BH Post-Op Bariatric Surgery:   Tirso Palomo is status post procedure listed above  HPI:     Wt Readings from Last 10 Encounters:   11/27/23 99.3 kg (219 lb)   10/19/23 98.3 kg (216 lb 12.8 oz)   09/29/23 98.2 kg (216 lb 9.6 oz)   07/05/23 98.9 kg (218 lb)   06/21/23 98 kg (216 lb)   06/05/23 99 kg (218 lb 3.2 oz)   04/19/23 102 kg (223 lb 12.8 oz)   03/30/23 105 kg (231 lb 9.6 oz)   03/20/23 108 kg (237 lb 9.6 oz)   03/16/23 109 kg (239 lb 9.6 oz)      She has epigastric pain when eating. She had gastric sleeve with hiatal hernia repair      Review of Systems    Review of Systems   Constitutional: Negative.    HENT: Negative.    Eyes: Negative.    Respiratory: Negative.    Cardiovascular: Negative.    Gastrointestinal: Negative.    Endocrine: Negative.    Genitourinary: Negative.    Musculoskeletal: Negative.    Skin: Negative.    Allergic/Immunologic: Negative.    Neurological: Negative.    Hematological: Negative.    Psychiatric/Behavioral: Negative.    Patient Active Problem List   Diagnosis    Chronic fatigue    Essential hypertension    Snoring    Hemorrhoids    Multiple joint pain    Acquired hypothyroidism    Screening mammogram, encounter for    Screening for cervical cancer    Screening for colon cancer    Screening for hyperlipidemia    Encounter for general adult medical examination with abnormal findings    Vitamin D deficiency    Urinary incontinence    Stress incontinence of urine    Spondylosis of lumbosacral spine without myelopathy    Class 1 obesity due to excess calories with serious comorbidity and body mass  index (BMI) of 34.0 to 34.9 in adult    Menopause present    Hypermobility of urethra    History of colonic polyps    Female bladder prolapse    Hormone replacement therapy    Abscess of right breast    Normal pelvic exam    Epidermal inclusion cyst    Status post sleeve gastrectomy    Chronic idiopathic constipation    History of tobacco use    Vaginal dryness    Alopecia    Change in bowel habit    Family history of colon cancer    H/O bariatric surgery       Past Medical History:   Diagnosis Date    Allergic     Arthritis     Elevated cholesterol     Hypertension     Hypothyroidism     Insomnia     Sciatica      Past Surgical History:   Procedure Laterality Date    TUBAL ABDOMINAL LIGATION  1993    BREAST SURGERY Right 2012    biopsy    COLONOSCOPY  2018 2018 2023    ENDOSCOPY N/A 02/09/2023    Procedure: ESOPHAGOGASTRODUODENOSCOPY with gastric biopsy;  Surgeon: Yue Jones MD;  Location: [facility] ENDOSCOPY;  Service: General;  Laterality: N/A;  normal    GASTRIC SLEEVE LAPAROSCOPIC N/A 03/16/2023    Procedure: GASTRIC SLEEVE LAPAROSCOPIC WITH DAVINCI ROBOT; HIATAL HERNIA REPAIR;  Surgeon: Yue Jones MD;  Location: [facility] MAIN OR;  Service: Robotics - DaVinci;  Laterality: N/A;    TOENAIL EXCISION      01/2022      The following portions of the patient's history were reviewed and updated as appropriate: allergies, current medications, past family history, past medical history, past social history, past surgical history, and problem list.    Vitals:    11/27/23 1027   BP: 134/61   Pulse: 51   SpO2: 100%       Physical Exam  Awake and alert  Normal mental status  Normal pulmonary effort  Abdomen appropriate tenderness  Incisions no erythema  Extremities no tenderness or swelling      Assessment:       Dysphagia       Post-op, the patient is having dysphagia and epigastric pain.     Plan:       Will get UGI and egd. She may have hiatal hernia or possible stricture

## 2023-11-29 ENCOUNTER — HOSPITAL ENCOUNTER (OUTPATIENT)
Dept: GENERAL RADIOLOGY | Facility: HOSPITAL | Age: 63
Discharge: HOME | End: 2023-11-29
Admitting: SURGERY
Payer: COMMERCIAL

## 2023-11-29 DIAGNOSIS — K21.9 HIATAL HERNIA WITH GERD: ICD-10-CM

## 2023-11-29 DIAGNOSIS — K44.9 HIATAL HERNIA WITH GERD: ICD-10-CM

## 2023-11-29 PROCEDURE — 25510000001 IOPAMIDOL PER 1 ML: Performed by: SURGERY

## 2023-11-29 PROCEDURE — 74240 X-RAY XM UPR GI TRC 1CNTRST: CPT

## 2023-11-29 RX ADMIN — IOPAMIDOL 100 ML: 755 INJECTION, SOLUTION INTRAVENOUS at 08:36

## 2023-11-30 ENCOUNTER — ANESTHESIA EVENT (OUTPATIENT)
Dept: GASTROENTEROLOGY | Facility: HOSPITAL | Age: 63
End: 2023-11-30
Payer: COMMERCIAL

## 2023-11-30 ENCOUNTER — ANESTHESIA (OUTPATIENT)
Dept: GASTROENTEROLOGY | Facility: HOSPITAL | Age: 63
End: 2023-11-30
Payer: COMMERCIAL

## 2023-11-30 ENCOUNTER — HOSPITAL ENCOUNTER (OUTPATIENT)
Facility: HOSPITAL | Age: 63
Setting detail: HOSPITAL OUTPATIENT SURGERY
Discharge: HOME | End: 2023-11-30
Attending: SURGERY | Admitting: SURGERY
Payer: COMMERCIAL

## 2023-11-30 VITALS
HEIGHT: 67 IN | SYSTOLIC BLOOD PRESSURE: 121 MMHG | BODY MASS INDEX: 34.71 KG/M2 | TEMPERATURE: 97.7 F | HEART RATE: 60 BPM | DIASTOLIC BLOOD PRESSURE: 68 MMHG | WEIGHT: 221.12 LBS | OXYGEN SATURATION: 100 % | RESPIRATION RATE: 16 BRPM

## 2023-11-30 PROCEDURE — 43245 EGD DILATE STRICTURE: CPT | Performed by: SURGERY

## 2023-11-30 PROCEDURE — C1726 CATH, BAL DIL, NON-VASCULAR: HCPCS | Performed by: SURGERY

## 2023-11-30 PROCEDURE — 25010000002 PROPOFOL 200 MG/20ML EMULSION: Performed by: NURSE ANESTHETIST, CERTIFIED REGISTERED

## 2023-11-30 PROCEDURE — 25810000003 SODIUM CHLORIDE 0.9 % SOLUTION: Performed by: NURSE ANESTHETIST, CERTIFIED REGISTERED

## 2023-11-30 RX ORDER — IPRATROPIUM BROMIDE AND ALBUTEROL SULFATE 2.5; .5 MG/3ML; MG/3ML
3 SOLUTION RESPIRATORY (INHALATION) ONCE AS NEEDED
Status: DISCONTINUED | OUTPATIENT
Start: 2023-11-30 | End: 2023-11-30 | Stop reason: HOSPADM

## 2023-11-30 RX ORDER — LABETALOL HYDROCHLORIDE 5 MG/ML
5 INJECTION, SOLUTION INTRAVENOUS
Status: DISCONTINUED | OUTPATIENT
Start: 2023-11-30 | End: 2023-11-30 | Stop reason: HOSPADM

## 2023-11-30 RX ORDER — HYDRALAZINE HYDROCHLORIDE 20 MG/ML
5 INJECTION INTRAMUSCULAR; INTRAVENOUS
Status: DISCONTINUED | OUTPATIENT
Start: 2023-11-30 | End: 2023-11-30 | Stop reason: HOSPADM

## 2023-11-30 RX ORDER — MEPERIDINE HYDROCHLORIDE 25 MG/ML
12.5 INJECTION INTRAMUSCULAR; INTRAVENOUS; SUBCUTANEOUS
Status: DISCONTINUED | OUTPATIENT
Start: 2023-11-30 | End: 2023-11-30 | Stop reason: HOSPADM

## 2023-11-30 RX ORDER — EPHEDRINE SULFATE 5 MG/ML
5 INJECTION INTRAVENOUS ONCE AS NEEDED
Status: DISCONTINUED | OUTPATIENT
Start: 2023-11-30 | End: 2023-11-30 | Stop reason: HOSPADM

## 2023-11-30 RX ORDER — ONDANSETRON 2 MG/ML
4 INJECTION INTRAMUSCULAR; INTRAVENOUS ONCE AS NEEDED
Status: DISCONTINUED | OUTPATIENT
Start: 2023-11-30 | End: 2023-11-30 | Stop reason: HOSPADM

## 2023-11-30 RX ORDER — SODIUM CHLORIDE 9 MG/ML
INJECTION, SOLUTION INTRAVENOUS CONTINUOUS PRN
Status: DISCONTINUED | OUTPATIENT
Start: 2023-11-30 | End: 2023-11-30 | Stop reason: SURG

## 2023-11-30 RX ORDER — GLYCOPYRROLATE 0.2 MG/ML
INJECTION INTRAMUSCULAR; INTRAVENOUS AS NEEDED
Status: DISCONTINUED | OUTPATIENT
Start: 2023-11-30 | End: 2023-11-30 | Stop reason: SURG

## 2023-11-30 RX ORDER — PROPOFOL 10 MG/ML
INJECTION, EMULSION INTRAVENOUS AS NEEDED
Status: DISCONTINUED | OUTPATIENT
Start: 2023-11-30 | End: 2023-11-30 | Stop reason: SURG

## 2023-11-30 RX ORDER — LIDOCAINE HYDROCHLORIDE 20 MG/ML
INJECTION, SOLUTION EPIDURAL; INFILTRATION; INTRACAUDAL; PERINEURAL AS NEEDED
Status: DISCONTINUED | OUTPATIENT
Start: 2023-11-30 | End: 2023-11-30 | Stop reason: SURG

## 2023-11-30 RX ADMIN — SODIUM CHLORIDE: 9 INJECTION, SOLUTION INTRAVENOUS at 13:15

## 2023-11-30 RX ADMIN — LIDOCAINE HYDROCHLORIDE 80 MG: 20 INJECTION, SOLUTION EPIDURAL; INFILTRATION; INTRACAUDAL; PERINEURAL at 13:32

## 2023-11-30 RX ADMIN — PROPOFOL 150 MG: 10 INJECTION, EMULSION INTRAVENOUS at 13:32

## 2023-11-30 RX ADMIN — GLYCOPYRROLATE 0.2 MG: 0.2 INJECTION INTRAMUSCULAR; INTRAVENOUS at 13:17

## 2023-11-30 RX ADMIN — PROPOFOL 50 MG: 10 INJECTION, EMULSION INTRAVENOUS at 13:36

## 2023-11-30 RX ADMIN — PROPOFOL 50 MG: 10 INJECTION, EMULSION INTRAVENOUS at 13:33

## 2023-11-30 NOTE — ANESTHESIA PREPROCEDURE EVALUATION
Anesthesia Evaluation     Patient summary reviewed and Nursing notes reviewed   NPO Solid Status: > 8 hours  NPO Liquid Status: > 8 hours           Airway   Mallampati: II  TM distance: >3 FB  Neck ROM: full  No difficulty expected  Dental - normal exam     Pulmonary - negative pulmonary ROS and normal exam   (-) sleep apnea, not a smoker  Cardiovascular - normal exam    ECG reviewed    (+) hypertension well controlled, hyperlipidemia  (-) angina, SZYMANSKI      Neuro/Psych- negative ROS  GI/Hepatic/Renal/Endo    (+) obesity, thyroid problem hypothyroidism    Musculoskeletal     Abdominal  - normal exam    Bowel sounds: normal.   Substance History - negative use     OB/GYN negative ob/gyn ROS         Other   arthritis,                   Anesthesia Plan    ASA 3     general   total IV anesthesia    Anesthetic plan, risks, benefits, and alternatives have been provided, discussed and informed consent has been obtained with: patient.    Plan discussed with CRNA and CAA.      CODE STATUS:

## 2023-11-30 NOTE — ANESTHESIA POSTPROCEDURE EVALUATION
Patient: Tirso Palomo    Procedure Summary       Date: 11/30/23 Room / Location: [facility] ENDOSCOPY 4 / [facility] ENDOSCOPY    Anesthesia Start: 1315 Anesthesia Stop: 1401    Procedure: ESOPHAGOGASTRODUODENOSCOPY with esophageal balloon dilation to 20mm Diagnosis:       Obesity, Class I, BMI 30-34.9      (Obesity, Class I, BMI 30-34.9 [E66.9])    Surgeons: Yue Jones MD Provider: Meri Maddox MD    Anesthesia Type: general ASA Status: 3            Anesthesia Type: general    Vitals  Vitals Value Taken Time   /68 11/30/23 1356   Temp     Pulse 56 11/30/23 1401   Resp 16 11/30/23 1356   SpO2 100 % 11/30/23 1401   Vitals shown include unfiled device data.        Post Anesthesia Care and Evaluation    Patient location during evaluation: PACU  Patient participation: complete - patient participated  Level of consciousness: awake and alert  Pain management: satisfactory to patient    Airway patency: patent  Anesthetic complications: No anesthetic complications  PONV Status: none  Cardiovascular status: acceptable  Respiratory status: acceptable  Hydration status: acceptable

## 2023-11-30 NOTE — OP NOTE
Surgeon:  Yue Jones MD  Preoperative Diagnosis: dysphagia after gastric sleeve    Postoperative Diagnosis: gastric body stricture    Procedure Performed: Upper endoscopy with dilation of the gastric body  to 20 mm     Indications: The patient has a history of gastric sleeve and has been experiencing epigastric pain and nausea and vomiting after eating. UGI showed small hiatal hernia.      Specimen: None  EBL: none    Procedure:     The procedure, indications, preparation and potential complications were explained to the patient, who indicated understanding and signed the corresponding consent forms.  The patient was identified, taken to the endoscopy suite, and placed on the left side down decubitus position.  The patient underwent a MAC anesthesia and was appropriately monitored through the case by the anesthesia personnel with continuous pulse oximetry, blood pressure, and cardiac monitoring.  A bite block was placed.  After adequate IV sedation and using a transoral technique a lubed flexible endoscope was placed in the hypopharynx and advanced to the gastric sleeve.  A stricture of the gastric sleeve near the angularis incisura was visualized.  A 18-20 balloon was deployed and I sequentially increased to 20 mm. I took care to not push through any resistance. The pyloris was also dilated in the same fashion.   The scope was then removed.

## 2023-11-30 NOTE — DISCHARGE INSTRUCTIONS
A responsible adult should stay with you and you should rest quietly for the rest of the day.    Do not drink alcohol, drive operate any heavy machinery or power tools or make any legal/important decisions for the next 24 hours.    Progress your diet as tolerated.  If you begin to experience severe pain, increased shortness of breath, racing heartbeat or a fever above 101F, seek immediate medical attention.     Follow up with MD as instructed.  Call office for results in 3 to 5 days if needed.    Surgeon:  Yue Jones MD  Preoperative Diagnosis: dysphagia after gastric sleeve     Postoperative Diagnosis: gastric body stricture     Procedure Performed: Upper endoscopy with dilation of the gastric body  to 20 mm      Indications: The patient has a history of gastric sleeve and has been experiencing epigastric pain and nausea and vomiting after eating. UGI showed small hiatal hernia.        Specimen: None  EBL: none     Procedure:      The procedure, indications, preparation and potential complications were explained to the patient, who indicated understanding and signed the corresponding consent forms.  The patient was identified, taken to the endoscopy suite, and placed on the left side down decubitus position.  The patient underwent a MAC anesthesia and was appropriately monitored through the case by the anesthesia personnel with continuous pulse oximetry, blood pressure, and cardiac monitoring.  A bite block was placed.  After adequate IV sedation and using a transoral technique a lubed flexible endoscope was placed in the hypopharynx and advanced to the gastric sleeve.  A stricture of the gastric sleeve near the angularis incisura was visualized.  A 18-20 balloon was deployed and I sequentially increased to 20 mm. I took care to not push through any resistance. The pyloris was also dilated in the same fashion.   The scope was then removed.

## 2023-11-30 NOTE — INTERVAL H&P NOTE
FL Upper GI Water Soluble    Result Date: 11/29/2023  Impression: 1. There is poor motility and advancement of contrast column within the esophagus with the patient in supine position. On upright position, there is normal clearance of contrast from the esophagus. 2. There is is a very small sliding esophageal hiatal hernia. No large hernia is identified. 3. No jana reflux is identified. 4. Normal postsurgical appearance of the stomach. Gastric emptying appears grossly unremarkable. Electronically Signed: Brittany Caro MD  11/29/2023 1:42 PM EST  Workstation ID: [id number]    H&P reviewed. The patient was examined and there are no changes to the H&P.

## 2023-12-04 ENCOUNTER — OFFICE VISIT (OUTPATIENT)
Dept: BARIATRICS/WEIGHT MGMT | Facility: CLINIC | Age: 63
End: 2023-12-04
Payer: COMMERCIAL

## 2023-12-04 ENCOUNTER — PREP FOR SURGERY (OUTPATIENT)
Dept: OTHER | Facility: HOSPITAL | Age: 63
End: 2023-12-04
Payer: COMMERCIAL

## 2023-12-04 VITALS
HEIGHT: 67 IN | WEIGHT: 219.8 LBS | OXYGEN SATURATION: 100 % | SYSTOLIC BLOOD PRESSURE: 118 MMHG | BODY MASS INDEX: 34.5 KG/M2 | DIASTOLIC BLOOD PRESSURE: 74 MMHG | HEART RATE: 60 BPM

## 2023-12-04 DIAGNOSIS — K21.9 HIATAL HERNIA WITH GERD: Primary | ICD-10-CM

## 2023-12-04 DIAGNOSIS — I10 ESSENTIAL HYPERTENSION: ICD-10-CM

## 2023-12-04 DIAGNOSIS — K44.9 HIATAL HERNIA WITH GERD: Primary | ICD-10-CM

## 2023-12-04 PROBLEM — E66.01 MORBID OBESITY: Status: ACTIVE | Noted: 2023-12-04

## 2023-12-04 PROCEDURE — 99213 OFFICE O/P EST LOW 20 MIN: CPT | Performed by: SURGERY

## 2023-12-04 RX ORDER — PANTOPRAZOLE SODIUM 40 MG/10ML
40 INJECTION, POWDER, LYOPHILIZED, FOR SOLUTION INTRAVENOUS ONCE
Status: CANCELLED | OUTPATIENT
Start: 2023-12-04 | End: 2023-12-04

## 2023-12-04 RX ORDER — SODIUM CHLORIDE 0.9 % (FLUSH) 0.9 %
3-10 SYRINGE (ML) INJECTION AS NEEDED
Status: CANCELLED | OUTPATIENT
Start: 2023-12-04

## 2023-12-04 RX ORDER — SODIUM CHLORIDE 0.9 % (FLUSH) 0.9 %
3 SYRINGE (ML) INJECTION EVERY 12 HOURS SCHEDULED
Status: CANCELLED | OUTPATIENT
Start: 2023-12-04

## 2023-12-04 RX ORDER — METOPROLOL SUCCINATE 50 MG/1
50 TABLET, EXTENDED RELEASE ORAL DAILY
Qty: 90 TABLET | Refills: 3 | Status: SHIPPED | OUTPATIENT
Start: 2023-12-04 | End: 2023-12-07 | Stop reason: HOSPADM

## 2023-12-04 RX ORDER — SODIUM CHLORIDE 9 MG/ML
40 INJECTION, SOLUTION INTRAVENOUS AS NEEDED
Status: CANCELLED | OUTPATIENT
Start: 2023-12-04

## 2023-12-04 RX ORDER — SCOLOPAMINE TRANSDERMAL SYSTEM 1 MG/1
1 PATCH, EXTENDED RELEASE TRANSDERMAL ONCE
Status: CANCELLED | OUTPATIENT
Start: 2023-12-04 | End: 2023-12-04

## 2023-12-04 RX ORDER — ACETAMINOPHEN 10 MG/ML
1000 INJECTION, SOLUTION INTRAVENOUS ONCE
Status: CANCELLED | OUTPATIENT
Start: 2023-12-04 | End: 2023-12-04

## 2023-12-04 RX ORDER — CHLORHEXIDINE GLUCONATE ORAL RINSE 1.2 MG/ML
15 SOLUTION DENTAL SEE ADMIN INSTRUCTIONS
Status: CANCELLED | OUTPATIENT
Start: 2023-12-04

## 2023-12-04 RX ORDER — SODIUM CHLORIDE, SODIUM LACTATE, POTASSIUM CHLORIDE, CALCIUM CHLORIDE 600; 310; 30; 20 MG/100ML; MG/100ML; MG/100ML; MG/100ML
100 INJECTION, SOLUTION INTRAVENOUS CONTINUOUS
Status: CANCELLED | OUTPATIENT
Start: 2023-12-04

## 2023-12-04 NOTE — H&P (VIEW-ONLY)
[facility] BARIATRIC SURGERY  [address] [place] [address]  [address] [place] [address]  Dept: [phone]  12/4/2023      Tirso Palomo.  [id number]  [id number]  1960  female    Date of last surgery: 11/30/2023ESOPHAGOGASTRODUODENOSCOPY with esophageal balloon dilation to 20mm      Chief Complaint: BH Post-Op Bariatric Surgery:   Tirso Palomo is status post procedure listed above  HPI: This is a 63-year-old lady with a history of gastric sleeve in March of this year.  About 2 months ago she began experiencing epigastric pain and reflux and dysphagia.  Last week upper endoscopy demonstrated hiatal hernia and mild stricture of the gastric body.  Upper GI results are demonstrated below.  FL Upper GI Water Soluble    Result Date: 11/29/2023  Impression: 1. There is poor motility and advancement of contrast column within the esophagus with the patient in supine position. On upright position, there is normal clearance of contrast from the esophagus. 2. There is is a very small sliding esophageal hiatal hernia. No large hernia is identified. 3. No jana reflux is identified. 4. Normal postsurgical appearance of the stomach. Gastric emptying appears grossly unremarkable. Electronically Signed: Brittany Caro MD  11/29/2023 1:42 PM EST  Workstation ID: [id number]     Wt Readings from Last 10 Encounters:   12/04/23 99.7 kg (219 lb 12.8 oz)   11/30/23 100 kg (221 lb 1.9 oz)   11/27/23 99.3 kg (219 lb)   10/19/23 98.3 kg (216 lb 12.8 oz)   09/29/23 98.2 kg (216 lb 9.6 oz)   07/05/23 98.9 kg (218 lb)   06/21/23 98 kg (216 lb)   06/05/23 99 kg (218 lb 3.2 oz)   04/19/23 102 kg (223 lb 12.8 oz)   03/30/23 105 kg (231 lb 9.6 oz)      ROS  Constitutional: Negative.    HENT: Negative.    Eyes: Negative.    Respiratory: Negative.    Cardiovascular: Negative.    Gastrointestinal: Negative.    Endocrine: Negative.    Genitourinary: Negative.     Musculoskeletal: Negative.    Skin: Negative.    Allergic/Immunologic: Negative.    Neurological: Negative.    Hematological: Negative.    Psychiatric/Behavioral: Negative.    Patient Active Problem List   Diagnosis    Chronic fatigue    Essential hypertension    Snoring    Hemorrhoids    Multiple joint pain    Acquired hypothyroidism    Screening mammogram, encounter for    Screening for cervical cancer    Screening for colon cancer    Screening for hyperlipidemia    Encounter for general adult medical examination with abnormal findings    Vitamin D deficiency    Urinary incontinence    Stress incontinence of urine    Spondylosis of lumbosacral spine without myelopathy    Class 1 obesity due to excess calories with serious comorbidity and body mass index (BMI) of 34.0 to 34.9 in adult    Menopause present    Hypermobility of urethra    History of colonic polyps    Female bladder prolapse    Hormone replacement therapy    Abscess of right breast    Normal pelvic exam    Epidermal inclusion cyst    Status post sleeve gastrectomy    Chronic idiopathic constipation    History of tobacco use    Vaginal dryness    Alopecia    Change in bowel habit    Family history of colon cancer    H/O bariatric surgery    Obesity, Class I, BMI 30-34.9       Past Medical History:   Diagnosis Date    Allergic     Arthritis     Elevated cholesterol     Hiatal hernia     Hypertension     Hypothyroidism     Insomnia     Sciatica      Past Surgical History:   Procedure Laterality Date    TUBAL ABDOMINAL LIGATION  1993    BREAST SURGERY Right 2012    biopsy    COLONOSCOPY  2018 2018 2023    ENDOSCOPY N/A 02/09/2023    Procedure: ESOPHAGOGASTRODUODENOSCOPY with gastric biopsy;  Surgeon: Yue Jones MD;  Location: [facility] ENDOSCOPY;  Service: General;  Laterality: N/A;  normal    GASTRIC SLEEVE LAPAROSCOPIC N/A 03/16/2023    Procedure: GASTRIC SLEEVE LAPAROSCOPIC WITH DAVINCI ROBOT; HIATAL HERNIA REPAIR;  Surgeon: Yue Jones MD;   Location: [facility] MAIN OR;  Service: Robotics - DaVinci;  Laterality: N/A;    ENDOSCOPY N/A 11/30/2023    Procedure: ESOPHAGOGASTRODUODENOSCOPY with esophageal balloon dilation to 20mm;  Surgeon: Yue Jones MD;  Location: [facility] ENDOSCOPY;  Service: General;  Laterality: N/A;  stricture of gastric body    TOENAIL EXCISION      01/2022      The following portions of the patient's history were reviewed and updated as appropriate: allergies, current medications, past family history, past medical history, past social history, past surgical history, and problem list.    Vitals:    12/04/23 0856   BP: 118/74   Pulse: 60   SpO2: 100%       Physical Exam  Awake and alert  Normal mental status  Normal pulmonary effort  Abdomen appropriate tenderness  Incisions no erythema  Extremities no tenderness or swelling      Assessment:     Hiatal hernia with GERD  Stricture of the gastric body    Post-op, the patient has hiatal hernia with GERD and also a mild stricture of the gastric body..     Plan:     Proceed to the operating room for hiatal hernia repair and at that time I will also assess the stomach to see if any kind of gastropexy or omentopexy would help to straighten out the sleeve and reduce the severity of the angulation at the angularis incisura.

## 2023-12-04 NOTE — PROGRESS NOTES
[facility] BAR SURG [facility] BARIATRIC SURGERY  [address] [place] [address]  [address] [place] [address]  Dept: [phone]  12/4/2023      Tirso Palomo.  [id number]  [id number]  1960  female    Date of last surgery: 11/30/2023ESOPHAGOGASTRODUODENOSCOPY with esophageal balloon dilation to 20mm      Chief Complaint: BH Post-Op Bariatric Surgery:   Tirso Palomo is status post procedure listed above  HPI: This is a 63-year-old lady with a history of gastric sleeve in March of this year.  About 2 months ago she began experiencing epigastric pain and reflux and dysphagia.  Last week upper endoscopy demonstrated hiatal hernia and mild stricture of the gastric body.  Upper GI results are demonstrated below.  FL Upper GI Water Soluble    Result Date: 11/29/2023  Impression: 1. There is poor motility and advancement of contrast column within the esophagus with the patient in supine position. On upright position, there is normal clearance of contrast from the esophagus. 2. There is is a very small sliding esophageal hiatal hernia. No large hernia is identified. 3. No jana reflux is identified. 4. Normal postsurgical appearance of the stomach. Gastric emptying appears grossly unremarkable. Electronically Signed: Brittany Caro MD  11/29/2023 1:42 PM EST  Workstation ID: [id number]     Wt Readings from Last 10 Encounters:   12/04/23 99.7 kg (219 lb 12.8 oz)   11/30/23 100 kg (221 lb 1.9 oz)   11/27/23 99.3 kg (219 lb)   10/19/23 98.3 kg (216 lb 12.8 oz)   09/29/23 98.2 kg (216 lb 9.6 oz)   07/05/23 98.9 kg (218 lb)   06/21/23 98 kg (216 lb)   06/05/23 99 kg (218 lb 3.2 oz)   04/19/23 102 kg (223 lb 12.8 oz)   03/30/23 105 kg (231 lb 9.6 oz)      ROS  Constitutional: Negative.    HENT: Negative.    Eyes: Negative.    Respiratory: Negative.    Cardiovascular: Negative.    Gastrointestinal: Negative.    Endocrine: Negative.    Genitourinary: Negative.     Musculoskeletal: Negative.    Skin: Negative.    Allergic/Immunologic: Negative.    Neurological: Negative.    Hematological: Negative.    Psychiatric/Behavioral: Negative.    Patient Active Problem List   Diagnosis    Chronic fatigue    Essential hypertension    Snoring    Hemorrhoids    Multiple joint pain    Acquired hypothyroidism    Screening mammogram, encounter for    Screening for cervical cancer    Screening for colon cancer    Screening for hyperlipidemia    Encounter for general adult medical examination with abnormal findings    Vitamin D deficiency    Urinary incontinence    Stress incontinence of urine    Spondylosis of lumbosacral spine without myelopathy    Class 1 obesity due to excess calories with serious comorbidity and body mass index (BMI) of 34.0 to 34.9 in adult    Menopause present    Hypermobility of urethra    History of colonic polyps    Female bladder prolapse    Hormone replacement therapy    Abscess of right breast    Normal pelvic exam    Epidermal inclusion cyst    Status post sleeve gastrectomy    Chronic idiopathic constipation    History of tobacco use    Vaginal dryness    Alopecia    Change in bowel habit    Family history of colon cancer    H/O bariatric surgery    Obesity, Class I, BMI 30-34.9       Past Medical History:   Diagnosis Date    Allergic     Arthritis     Elevated cholesterol     Hiatal hernia     Hypertension     Hypothyroidism     Insomnia     Sciatica      Past Surgical History:   Procedure Laterality Date    TUBAL ABDOMINAL LIGATION  1993    BREAST SURGERY Right 2012    biopsy    COLONOSCOPY  2018 2018 2023    ENDOSCOPY N/A 02/09/2023    Procedure: ESOPHAGOGASTRODUODENOSCOPY with gastric biopsy;  Surgeon: Yue Jones MD;  Location: [facility] ENDOSCOPY;  Service: General;  Laterality: N/A;  normal    GASTRIC SLEEVE LAPAROSCOPIC N/A 03/16/2023    Procedure: GASTRIC SLEEVE LAPAROSCOPIC WITH DAVINCI ROBOT; HIATAL HERNIA REPAIR;  Surgeon: Yue Jones MD;   Location: [facility] MAIN OR;  Service: Robotics - DaVinci;  Laterality: N/A;    ENDOSCOPY N/A 11/30/2023    Procedure: ESOPHAGOGASTRODUODENOSCOPY with esophageal balloon dilation to 20mm;  Surgeon: Yue Jones MD;  Location: [facility] ENDOSCOPY;  Service: General;  Laterality: N/A;  stricture of gastric body    TOENAIL EXCISION      01/2022      The following portions of the patient's history were reviewed and updated as appropriate: allergies, current medications, past family history, past medical history, past social history, past surgical history, and problem list.    Vitals:    12/04/23 0856   BP: 118/74   Pulse: 60   SpO2: 100%       Physical Exam  Awake and alert  Normal mental status  Normal pulmonary effort  Abdomen appropriate tenderness  Incisions no erythema  Extremities no tenderness or swelling      Assessment:     Hiatal hernia with GERD  Stricture of the gastric body    Post-op, the patient has hiatal hernia with GERD and also a mild stricture of the gastric body..     Plan:     Proceed to the operating room for hiatal hernia repair and at that time I will also assess the stomach to see if any kind of gastropexy or omentopexy would help to straighten out the sleeve and reduce the severity of the angulation at the angularis incisura.

## 2023-12-05 ENCOUNTER — LAB (OUTPATIENT)
Dept: LAB | Facility: HOSPITAL | Age: 63
End: 2023-12-05
Payer: COMMERCIAL

## 2023-12-05 ENCOUNTER — HOSPITAL ENCOUNTER (OUTPATIENT)
Dept: CARDIOLOGY | Facility: HOSPITAL | Age: 63
Discharge: HOME | End: 2023-12-05
Payer: COMMERCIAL

## 2023-12-05 DIAGNOSIS — K21.9 HIATAL HERNIA WITH GERD: ICD-10-CM

## 2023-12-05 DIAGNOSIS — K44.9 HIATAL HERNIA WITH GERD: ICD-10-CM

## 2023-12-05 LAB
ABO GROUP BLD: NORMAL
ANION GAP SERPL CALCULATED.3IONS-SCNC: 10.7 MMOL/L (ref 5–15)
BASOPHILS # BLD AUTO: 0.04 10*3/MM3 (ref 0–0.2)
BASOPHILS NFR BLD AUTO: 0.6 % (ref 0–1.5)
BLD GP AB SCN SERPL QL: NEGATIVE
BUN SERPL-MCNC: 8 MG/DL (ref 8–23)
BUN/CREAT SERPL: 9.3 (ref 7–25)
CALCIUM SPEC-SCNC: 10 MG/DL (ref 8.6–10.5)
CHLORIDE SERPL-SCNC: 107 MMOL/L (ref 98–107)
CO2 SERPL-SCNC: 25.3 MMOL/L (ref 22–29)
CREAT SERPL-MCNC: 0.86 MG/DL (ref 0.57–1)
DEPRECATED RDW RBC AUTO: 38.4 FL (ref 37–54)
EGFRCR SERPLBLD CKD-EPI 2021: 75.1 ML/MIN/1.73
EOSINOPHIL # BLD AUTO: 0.13 10*3/MM3 (ref 0–0.4)
EOSINOPHIL NFR BLD AUTO: 2.1 % (ref 0.3–6.2)
ERYTHROCYTE [DISTWIDTH] IN BLOOD BY AUTOMATED COUNT: 12.7 % (ref 12.3–15.4)
GLUCOSE SERPL-MCNC: 97 MG/DL (ref 65–99)
HCT VFR BLD AUTO: 39.2 % (ref 34–46.6)
HGB BLD-MCNC: 13.2 G/DL (ref 12–15.9)
IMM GRANULOCYTES # BLD AUTO: 0.02 10*3/MM3 (ref 0–0.05)
IMM GRANULOCYTES NFR BLD AUTO: 0.3 % (ref 0–0.5)
LYMPHOCYTES # BLD AUTO: 2.2 10*3/MM3 (ref 0.7–3.1)
LYMPHOCYTES NFR BLD AUTO: 35.5 % (ref 19.6–45.3)
MCH RBC QN AUTO: 28.5 PG (ref 26.6–33)
MCHC RBC AUTO-ENTMCNC: 33.7 G/DL (ref 31.5–35.7)
MCV RBC AUTO: 84.7 FL (ref 79–97)
MONOCYTES # BLD AUTO: 0.51 10*3/MM3 (ref 0.1–0.9)
MONOCYTES NFR BLD AUTO: 8.2 % (ref 5–12)
NEUTROPHILS NFR BLD AUTO: 3.3 10*3/MM3 (ref 1.7–7)
NEUTROPHILS NFR BLD AUTO: 53.3 % (ref 42.7–76)
NRBC BLD AUTO-RTO: 0 /100 WBC (ref 0–0.2)
PLATELET # BLD AUTO: 318 10*3/MM3 (ref 140–450)
PMV BLD AUTO: 11.1 FL (ref 6–12)
POTASSIUM SERPL-SCNC: 4.3 MMOL/L (ref 3.5–5.2)
RBC # BLD AUTO: 4.63 10*6/MM3 (ref 3.77–5.28)
RH BLD: NEGATIVE
SODIUM SERPL-SCNC: 143 MMOL/L (ref 136–145)
T&S EXPIRATION DATE: NORMAL
WBC NRBC COR # BLD AUTO: 6.2 10*3/MM3 (ref 3.4–10.8)

## 2023-12-05 PROCEDURE — 86901 BLOOD TYPING SEROLOGIC RH(D): CPT

## 2023-12-05 PROCEDURE — 93005 ELECTROCARDIOGRAM TRACING: CPT

## 2023-12-05 PROCEDURE — 85025 COMPLETE CBC W/AUTO DIFF WBC: CPT | Performed by: SURGERY

## 2023-12-05 PROCEDURE — 86900 BLOOD TYPING SEROLOGIC ABO: CPT

## 2023-12-05 PROCEDURE — 86850 RBC ANTIBODY SCREEN: CPT

## 2023-12-05 PROCEDURE — 80048 BASIC METABOLIC PNL TOTAL CA: CPT | Performed by: SURGERY

## 2023-12-05 PROCEDURE — 36415 COLL VENOUS BLD VENIPUNCTURE: CPT | Performed by: SURGERY

## 2023-12-06 ENCOUNTER — ANESTHESIA EVENT (OUTPATIENT)
Dept: PERIOP | Facility: HOSPITAL | Age: 63
End: 2023-12-06
Payer: COMMERCIAL

## 2023-12-06 ENCOUNTER — ANESTHESIA (OUTPATIENT)
Dept: PERIOP | Facility: HOSPITAL | Age: 63
End: 2023-12-06
Payer: COMMERCIAL

## 2023-12-06 ENCOUNTER — HOSPITAL ENCOUNTER (OUTPATIENT)
Facility: HOSPITAL | Age: 63
Discharge: HOME | End: 2023-12-07
Attending: SURGERY | Admitting: SURGERY
Payer: COMMERCIAL

## 2023-12-06 DIAGNOSIS — K21.9 HIATAL HERNIA WITH GERD: ICD-10-CM

## 2023-12-06 DIAGNOSIS — K44.9 HIATAL HERNIA WITH GERD: ICD-10-CM

## 2023-12-06 LAB
ALBUMIN SERPL-MCNC: 4 G/DL (ref 3.5–5.2)
ALBUMIN/GLOB SERPL: 1.5 G/DL
ALP SERPL-CCNC: 68 U/L (ref 39–117)
ALT SERPL W P-5'-P-CCNC: 25 U/L (ref 1–33)
ANION GAP SERPL CALCULATED.3IONS-SCNC: 10 MMOL/L (ref 5–15)
AST SERPL-CCNC: 33 U/L (ref 1–32)
BASOPHILS # BLD AUTO: 0.1 10*3/MM3 (ref 0–0.2)
BASOPHILS NFR BLD AUTO: 0.7 % (ref 0–1.5)
BILIRUB SERPL-MCNC: 0.2 MG/DL (ref 0–1.2)
BUN SERPL-MCNC: 9 MG/DL (ref 8–23)
BUN/CREAT SERPL: 10.6 (ref 7–25)
CALCIUM SPEC-SCNC: 8.8 MG/DL (ref 8.6–10.5)
CHLORIDE SERPL-SCNC: 103 MMOL/L (ref 98–107)
CO2 SERPL-SCNC: 24 MMOL/L (ref 22–29)
CREAT SERPL-MCNC: 0.85 MG/DL (ref 0.57–1)
DEPRECATED RDW RBC AUTO: 45.9 FL (ref 37–54)
EGFRCR SERPLBLD CKD-EPI 2021: 76.1 ML/MIN/1.73
EOSINOPHIL # BLD AUTO: 0.1 10*3/MM3 (ref 0–0.4)
EOSINOPHIL NFR BLD AUTO: 0.7 % (ref 0.3–6.2)
ERYTHROCYTE [DISTWIDTH] IN BLOOD BY AUTOMATED COUNT: 14.6 % (ref 12.3–15.4)
GLOBULIN UR ELPH-MCNC: 2.7 GM/DL
GLUCOSE SERPL-MCNC: 129 MG/DL (ref 65–99)
HCT VFR BLD AUTO: 43.6 % (ref 34–46.6)
HGB BLD-MCNC: 13.9 G/DL (ref 12–15.9)
LYMPHOCYTES # BLD AUTO: 0.9 10*3/MM3 (ref 0.7–3.1)
LYMPHOCYTES NFR BLD AUTO: 6.5 % (ref 19.6–45.3)
MAGNESIUM SERPL-MCNC: 2.2 MG/DL (ref 1.6–2.4)
MCH RBC QN AUTO: 28.6 PG (ref 26.6–33)
MCHC RBC AUTO-ENTMCNC: 31.9 G/DL (ref 31.5–35.7)
MCV RBC AUTO: 89.6 FL (ref 79–97)
MONOCYTES # BLD AUTO: 0.3 10*3/MM3 (ref 0.1–0.9)
MONOCYTES NFR BLD AUTO: 2 % (ref 5–12)
NEUTROPHILS NFR BLD AUTO: 12.5 10*3/MM3 (ref 1.7–7)
NEUTROPHILS NFR BLD AUTO: 90.1 % (ref 42.7–76)
NRBC BLD AUTO-RTO: 0.1 /100 WBC (ref 0–0.2)
PHOSPHATE SERPL-MCNC: 3 MG/DL (ref 2.5–4.5)
PLATELET # BLD AUTO: 314 10*3/MM3 (ref 140–450)
PMV BLD AUTO: 9.5 FL (ref 6–12)
POTASSIUM SERPL-SCNC: 4.2 MMOL/L (ref 3.5–5.2)
PROT SERPL-MCNC: 6.7 G/DL (ref 6–8.5)
RBC # BLD AUTO: 4.87 10*6/MM3 (ref 3.77–5.28)
SODIUM SERPL-SCNC: 137 MMOL/L (ref 136–145)
WBC NRBC COR # BLD AUTO: 13.8 10*3/MM3 (ref 3.4–10.8)

## 2023-12-06 PROCEDURE — 43281 LAP PARAESOPHAG HERN REPAIR: CPT | Performed by: NURSE PRACTITIONER

## 2023-12-06 PROCEDURE — 85025 COMPLETE CBC W/AUTO DIFF WBC: CPT | Performed by: SURGERY

## 2023-12-06 PROCEDURE — 25810000003 LACTATED RINGERS SOLUTION: Performed by: SURGERY

## 2023-12-06 PROCEDURE — 25810000003 LACTATED RINGERS PER 1000 ML: Performed by: SURGERY

## 2023-12-06 PROCEDURE — 25010000002 MAGNESIUM SULFATE PER 500 MG OF MAGNESIUM: Performed by: NURSE ANESTHETIST, CERTIFIED REGISTERED

## 2023-12-06 PROCEDURE — 25010000002 CLONIDINE PER 1 MG: Performed by: SURGERY

## 2023-12-06 PROCEDURE — 25010000002 THIAMINE HCL 200 MG/2ML SOLUTION: Performed by: SURGERY

## 2023-12-06 PROCEDURE — G0378 HOSPITAL OBSERVATION PER HR: HCPCS

## 2023-12-06 PROCEDURE — 25010000002 DEXAMETHASONE PER 1 MG: Performed by: NURSE ANESTHETIST, CERTIFIED REGISTERED

## 2023-12-06 PROCEDURE — 25010000002 ONDANSETRON PER 1 MG: Performed by: NURSE ANESTHETIST, CERTIFIED REGISTERED

## 2023-12-06 PROCEDURE — 25010000002 PROPOFOL 200 MG/20ML EMULSION: Performed by: NURSE ANESTHETIST, CERTIFIED REGISTERED

## 2023-12-06 PROCEDURE — 25010000002 SUGAMMADEX 200 MG/2ML SOLUTION: Performed by: NURSE ANESTHETIST, CERTIFIED REGISTERED

## 2023-12-06 PROCEDURE — 25010000002 ACETAMINOPHEN 10 MG/ML SOLUTION: Performed by: SURGERY

## 2023-12-06 PROCEDURE — 25010000002 ROPIVACAINE PER 1 MG: Performed by: SURGERY

## 2023-12-06 PROCEDURE — 25010000002 CEFAZOLIN PER 500 MG: Performed by: SURGERY

## 2023-12-06 PROCEDURE — 25010000002 KETOROLAC TROMETHAMINE PER 15 MG: Performed by: SURGERY

## 2023-12-06 PROCEDURE — 43281 LAP PARAESOPHAG HERN REPAIR: CPT | Performed by: SURGERY

## 2023-12-06 PROCEDURE — 83735 ASSAY OF MAGNESIUM: CPT | Performed by: SURGERY

## 2023-12-06 PROCEDURE — 84100 ASSAY OF PHOSPHORUS: CPT | Performed by: SURGERY

## 2023-12-06 PROCEDURE — 25010000002 HYDROMORPHONE 1 MG/ML SOLUTION: Performed by: NURSE ANESTHETIST, CERTIFIED REGISTERED

## 2023-12-06 PROCEDURE — 80053 COMPREHEN METABOLIC PANEL: CPT | Performed by: SURGERY

## 2023-12-06 PROCEDURE — 25010000002 FENTANYL CITRATE (PF) 100 MCG/2ML SOLUTION: Performed by: NURSE ANESTHETIST, CERTIFIED REGISTERED

## 2023-12-06 PROCEDURE — 25010000002 EPINEPHRINE 1 MG/ML SOLUTION: Performed by: SURGERY

## 2023-12-06 DEVICE — ABSORBABLE WOUND CLOSURE DEVICE
Type: IMPLANTABLE DEVICE | Site: ABDOMEN | Status: FUNCTIONAL
Brand: V-LOC 180

## 2023-12-06 DEVICE — ABSORBABLE WOUND CLOSURE DEVICE
Type: IMPLANTABLE DEVICE | Site: ABDOMEN | Status: FUNCTIONAL
Brand: SYNETURE

## 2023-12-06 DEVICE — PBT NON ABSORBABLE WOUND CLOSURE DEVICE
Type: IMPLANTABLE DEVICE | Site: ABDOMEN | Status: FUNCTIONAL
Brand: V-LOC

## 2023-12-06 RX ORDER — DIPHENHYDRAMINE HYDROCHLORIDE 50 MG/ML
25 INJECTION INTRAMUSCULAR; INTRAVENOUS EVERY 4 HOURS PRN
Status: DISCONTINUED | OUTPATIENT
Start: 2023-12-06 | End: 2023-12-07 | Stop reason: HOSPADM

## 2023-12-06 RX ORDER — ENOXAPARIN SODIUM 100 MG/ML
40 INJECTION SUBCUTANEOUS DAILY
Status: DISCONTINUED | OUTPATIENT
Start: 2023-12-07 | End: 2023-12-07 | Stop reason: HOSPADM

## 2023-12-06 RX ORDER — EPHEDRINE SULFATE 5 MG/ML
5 INJECTION INTRAVENOUS ONCE AS NEEDED
Status: DISCONTINUED | OUTPATIENT
Start: 2023-12-06 | End: 2023-12-06 | Stop reason: HOSPADM

## 2023-12-06 RX ORDER — HYDRALAZINE HYDROCHLORIDE 20 MG/ML
5 INJECTION INTRAMUSCULAR; INTRAVENOUS
Status: DISCONTINUED | OUTPATIENT
Start: 2023-12-06 | End: 2023-12-06 | Stop reason: HOSPADM

## 2023-12-06 RX ORDER — FLUMAZENIL 0.1 MG/ML
0.2 INJECTION INTRAVENOUS AS NEEDED
Status: DISCONTINUED | OUTPATIENT
Start: 2023-12-06 | End: 2023-12-06 | Stop reason: HOSPADM

## 2023-12-06 RX ORDER — ONDANSETRON 4 MG/1
8 TABLET, FILM COATED ORAL EVERY 6 HOURS PRN
Status: DISCONTINUED | OUTPATIENT
Start: 2023-12-06 | End: 2023-12-07 | Stop reason: HOSPADM

## 2023-12-06 RX ORDER — OXYCODONE HYDROCHLORIDE 5 MG/1
10 TABLET ORAL EVERY 4 HOURS PRN
Status: DISCONTINUED | OUTPATIENT
Start: 2023-12-06 | End: 2023-12-06 | Stop reason: HOSPADM

## 2023-12-06 RX ORDER — MEPERIDINE HYDROCHLORIDE 25 MG/ML
12.5 INJECTION INTRAMUSCULAR; INTRAVENOUS; SUBCUTANEOUS
Status: DISCONTINUED | OUTPATIENT
Start: 2023-12-06 | End: 2023-12-06 | Stop reason: HOSPADM

## 2023-12-06 RX ORDER — NALOXONE HCL 0.4 MG/ML
0.1 VIAL (ML) INJECTION
Status: DISCONTINUED | OUTPATIENT
Start: 2023-12-06 | End: 2023-12-07 | Stop reason: HOSPADM

## 2023-12-06 RX ORDER — METOPROLOL SUCCINATE 50 MG/1
50 TABLET, EXTENDED RELEASE ORAL DAILY
Status: DISCONTINUED | OUTPATIENT
Start: 2023-12-07 | End: 2023-12-07 | Stop reason: HOSPADM

## 2023-12-06 RX ORDER — SODIUM CHLORIDE 0.9 % (FLUSH) 0.9 %
3 SYRINGE (ML) INJECTION EVERY 12 HOURS SCHEDULED
Status: DISCONTINUED | OUTPATIENT
Start: 2023-12-06 | End: 2023-12-07 | Stop reason: HOSPADM

## 2023-12-06 RX ORDER — HYDROMORPHONE HYDROCHLORIDE 2 MG/1
2 TABLET ORAL EVERY 4 HOURS PRN
Status: DISCONTINUED | OUTPATIENT
Start: 2023-12-06 | End: 2023-12-07 | Stop reason: HOSPADM

## 2023-12-06 RX ORDER — IPRATROPIUM BROMIDE AND ALBUTEROL SULFATE 2.5; .5 MG/3ML; MG/3ML
3 SOLUTION RESPIRATORY (INHALATION) ONCE AS NEEDED
Status: DISCONTINUED | OUTPATIENT
Start: 2023-12-06 | End: 2023-12-06 | Stop reason: HOSPADM

## 2023-12-06 RX ORDER — HYDRALAZINE HYDROCHLORIDE 20 MG/ML
20-30 INJECTION INTRAMUSCULAR; INTRAVENOUS
Status: DISCONTINUED | OUTPATIENT
Start: 2023-12-06 | End: 2023-12-07 | Stop reason: HOSPADM

## 2023-12-06 RX ORDER — SODIUM CHLORIDE 9 MG/ML
40 INJECTION, SOLUTION INTRAVENOUS AS NEEDED
Status: DISCONTINUED | OUTPATIENT
Start: 2023-12-06 | End: 2023-12-06 | Stop reason: HOSPADM

## 2023-12-06 RX ORDER — ACETAMINOPHEN 500 MG
1000 TABLET ORAL EVERY 6 HOURS
Qty: 16 TABLET | Refills: 0 | Status: DISCONTINUED | OUTPATIENT
Start: 2023-12-06 | End: 2023-12-07 | Stop reason: HOSPADM

## 2023-12-06 RX ORDER — PANTOPRAZOLE SODIUM 40 MG/10ML
40 INJECTION, POWDER, LYOPHILIZED, FOR SOLUTION INTRAVENOUS ONCE
Status: COMPLETED | OUTPATIENT
Start: 2023-12-06 | End: 2023-12-06

## 2023-12-06 RX ORDER — ONDANSETRON 2 MG/ML
INJECTION INTRAMUSCULAR; INTRAVENOUS AS NEEDED
Status: DISCONTINUED | OUTPATIENT
Start: 2023-12-06 | End: 2023-12-06 | Stop reason: SURG

## 2023-12-06 RX ORDER — FENTANYL CITRATE 50 UG/ML
INJECTION, SOLUTION INTRAMUSCULAR; INTRAVENOUS AS NEEDED
Status: DISCONTINUED | OUTPATIENT
Start: 2023-12-06 | End: 2023-12-06 | Stop reason: SURG

## 2023-12-06 RX ORDER — LIDOCAINE HYDROCHLORIDE 10 MG/ML
INJECTION, SOLUTION EPIDURAL; INFILTRATION; INTRACAUDAL; PERINEURAL AS NEEDED
Status: DISCONTINUED | OUTPATIENT
Start: 2023-12-06 | End: 2023-12-06 | Stop reason: SURG

## 2023-12-06 RX ORDER — SODIUM CHLORIDE, SODIUM LACTATE, POTASSIUM CHLORIDE, CALCIUM CHLORIDE 600; 310; 30; 20 MG/100ML; MG/100ML; MG/100ML; MG/100ML
100 INJECTION, SOLUTION INTRAVENOUS CONTINUOUS
Status: DISCONTINUED | OUTPATIENT
Start: 2023-12-06 | End: 2023-12-07

## 2023-12-06 RX ORDER — CHLORHEXIDINE GLUCONATE ORAL RINSE 1.2 MG/ML
15 SOLUTION DENTAL SEE ADMIN INSTRUCTIONS
Status: COMPLETED | OUTPATIENT
Start: 2023-12-06 | End: 2023-12-06

## 2023-12-06 RX ORDER — GABAPENTIN 300 MG/1
300 CAPSULE ORAL EVERY 8 HOURS SCHEDULED
Status: DISCONTINUED | OUTPATIENT
Start: 2023-12-06 | End: 2023-12-07 | Stop reason: HOSPADM

## 2023-12-06 RX ORDER — SPIRONOLACTONE 100 MG/1
100 TABLET, FILM COATED ORAL 2 TIMES DAILY
Status: DISCONTINUED | OUTPATIENT
Start: 2023-12-06 | End: 2023-12-07 | Stop reason: HOSPADM

## 2023-12-06 RX ORDER — PANTOPRAZOLE SODIUM 40 MG/1
40 TABLET, DELAYED RELEASE ORAL EVERY MORNING
Status: DISCONTINUED | OUTPATIENT
Start: 2023-12-07 | End: 2023-12-07 | Stop reason: HOSPADM

## 2023-12-06 RX ORDER — LEVOTHYROXINE SODIUM 0.07 MG/1
75 TABLET ORAL
Status: DISCONTINUED | OUTPATIENT
Start: 2023-12-07 | End: 2023-12-07 | Stop reason: HOSPADM

## 2023-12-06 RX ORDER — SCOLOPAMINE TRANSDERMAL SYSTEM 1 MG/1
1 PATCH, EXTENDED RELEASE TRANSDERMAL ONCE
Status: DISCONTINUED | OUTPATIENT
Start: 2023-12-06 | End: 2023-12-06

## 2023-12-06 RX ORDER — PROCHLORPERAZINE EDISYLATE 5 MG/ML
10 INJECTION INTRAMUSCULAR; INTRAVENOUS EVERY 6 HOURS PRN
Status: DISCONTINUED | OUTPATIENT
Start: 2023-12-06 | End: 2023-12-07 | Stop reason: HOSPADM

## 2023-12-06 RX ORDER — ROCURONIUM BROMIDE 10 MG/ML
INJECTION, SOLUTION INTRAVENOUS AS NEEDED
Status: DISCONTINUED | OUTPATIENT
Start: 2023-12-06 | End: 2023-12-06 | Stop reason: SURG

## 2023-12-06 RX ORDER — OXYCODONE HYDROCHLORIDE 5 MG/1
5 TABLET ORAL ONCE AS NEEDED
Status: DISCONTINUED | OUTPATIENT
Start: 2023-12-06 | End: 2023-12-06 | Stop reason: HOSPADM

## 2023-12-06 RX ORDER — ALBUTEROL SULFATE 2.5 MG/3ML
2.5 SOLUTION RESPIRATORY (INHALATION) EVERY 4 HOURS PRN
Status: DISCONTINUED | OUTPATIENT
Start: 2023-12-06 | End: 2023-12-07 | Stop reason: HOSPADM

## 2023-12-06 RX ORDER — EPHEDRINE SULFATE 5 MG/ML
INJECTION INTRAVENOUS AS NEEDED
Status: DISCONTINUED | OUTPATIENT
Start: 2023-12-06 | End: 2023-12-06 | Stop reason: SURG

## 2023-12-06 RX ORDER — ACETAMINOPHEN 160 MG/5ML
1000 SOLUTION ORAL EVERY 6 HOURS
Status: DISCONTINUED | OUTPATIENT
Start: 2023-12-06 | End: 2023-12-07 | Stop reason: HOSPADM

## 2023-12-06 RX ORDER — ONDANSETRON 2 MG/ML
4 INJECTION INTRAMUSCULAR; INTRAVENOUS ONCE AS NEEDED
Status: DISCONTINUED | OUTPATIENT
Start: 2023-12-06 | End: 2023-12-06 | Stop reason: HOSPADM

## 2023-12-06 RX ORDER — ACETAMINOPHEN 10 MG/ML
1000 INJECTION, SOLUTION INTRAVENOUS ONCE
Status: COMPLETED | OUTPATIENT
Start: 2023-12-06 | End: 2023-12-06

## 2023-12-06 RX ORDER — METOCLOPRAMIDE HYDROCHLORIDE 5 MG/ML
10 INJECTION INTRAMUSCULAR; INTRAVENOUS EVERY 6 HOURS PRN
Status: DISCONTINUED | OUTPATIENT
Start: 2023-12-06 | End: 2023-12-07 | Stop reason: HOSPADM

## 2023-12-06 RX ORDER — SODIUM CHLORIDE, SODIUM LACTATE, POTASSIUM CHLORIDE, CALCIUM CHLORIDE 600; 310; 30; 20 MG/100ML; MG/100ML; MG/100ML; MG/100ML
100 INJECTION, SOLUTION INTRAVENOUS CONTINUOUS
Status: DISCONTINUED | OUTPATIENT
Start: 2023-12-06 | End: 2023-12-06

## 2023-12-06 RX ORDER — NALOXONE HCL 0.4 MG/ML
0.4 VIAL (ML) INJECTION AS NEEDED
Status: DISCONTINUED | OUTPATIENT
Start: 2023-12-06 | End: 2023-12-06 | Stop reason: HOSPADM

## 2023-12-06 RX ORDER — DEXAMETHASONE SODIUM PHOSPHATE 4 MG/ML
INJECTION, SOLUTION INTRA-ARTICULAR; INTRALESIONAL; INTRAMUSCULAR; INTRAVENOUS; SOFT TISSUE AS NEEDED
Status: DISCONTINUED | OUTPATIENT
Start: 2023-12-06 | End: 2023-12-06 | Stop reason: SURG

## 2023-12-06 RX ORDER — PHENYLEPHRINE HCL IN 0.9% NACL 1 MG/10 ML
SYRINGE (ML) INTRAVENOUS AS NEEDED
Status: DISCONTINUED | OUTPATIENT
Start: 2023-12-06 | End: 2023-12-06 | Stop reason: SURG

## 2023-12-06 RX ORDER — LABETALOL HYDROCHLORIDE 5 MG/ML
5 INJECTION, SOLUTION INTRAVENOUS
Status: DISCONTINUED | OUTPATIENT
Start: 2023-12-06 | End: 2023-12-06 | Stop reason: HOSPADM

## 2023-12-06 RX ORDER — ONDANSETRON 2 MG/ML
8 INJECTION INTRAMUSCULAR; INTRAVENOUS EVERY 6 HOURS PRN
Status: DISCONTINUED | OUTPATIENT
Start: 2023-12-06 | End: 2023-12-07 | Stop reason: HOSPADM

## 2023-12-06 RX ORDER — SODIUM CHLORIDE 0.9 % (FLUSH) 0.9 %
3-10 SYRINGE (ML) INJECTION AS NEEDED
Status: DISCONTINUED | OUTPATIENT
Start: 2023-12-06 | End: 2023-12-06 | Stop reason: HOSPADM

## 2023-12-06 RX ORDER — PROPOFOL 10 MG/ML
INJECTION, EMULSION INTRAVENOUS AS NEEDED
Status: DISCONTINUED | OUTPATIENT
Start: 2023-12-06 | End: 2023-12-06 | Stop reason: SURG

## 2023-12-06 RX ORDER — CYANOCOBALAMIN 1000 UG/ML
1000 INJECTION, SOLUTION INTRAMUSCULAR; SUBCUTANEOUS ONCE
Status: COMPLETED | OUTPATIENT
Start: 2023-12-07 | End: 2023-12-07

## 2023-12-06 RX ORDER — GLYCOPYRROLATE 0.2 MG/ML
INJECTION INTRAMUSCULAR; INTRAVENOUS AS NEEDED
Status: DISCONTINUED | OUTPATIENT
Start: 2023-12-06 | End: 2023-12-06 | Stop reason: SURG

## 2023-12-06 RX ORDER — MAGNESIUM SULFATE HEPTAHYDRATE 500 MG/ML
INJECTION, SOLUTION INTRAMUSCULAR; INTRAVENOUS AS NEEDED
Status: DISCONTINUED | OUTPATIENT
Start: 2023-12-06 | End: 2023-12-06 | Stop reason: SURG

## 2023-12-06 RX ORDER — THIAMINE HYDROCHLORIDE 100 MG/ML
100 INJECTION, SOLUTION INTRAMUSCULAR; INTRAVENOUS ONCE
Status: COMPLETED | OUTPATIENT
Start: 2023-12-06 | End: 2023-12-06

## 2023-12-06 RX ADMIN — SCOPALAMINE 1 PATCH: 1 PATCH, EXTENDED RELEASE TRANSDERMAL at 12:55

## 2023-12-06 RX ADMIN — FENTANYL CITRATE 50 MCG: 50 INJECTION, SOLUTION INTRAMUSCULAR; INTRAVENOUS at 14:03

## 2023-12-06 RX ADMIN — MAGNESIUM SULFATE HEPTAHYDRATE 1 G: 500 INJECTION, SOLUTION INTRAMUSCULAR; INTRAVENOUS at 14:18

## 2023-12-06 RX ADMIN — GLYCOPYRROLATE 0.2 MCG: 0.2 INJECTION INTRAMUSCULAR; INTRAVENOUS at 14:14

## 2023-12-06 RX ADMIN — PROPOFOL 175 MCG/KG/MIN: 10 INJECTION, EMULSION INTRAVENOUS at 14:22

## 2023-12-06 RX ADMIN — DEXAMETHASONE SODIUM PHOSPHATE 4 MG: 4 INJECTION, SOLUTION INTRAMUSCULAR; INTRAVENOUS at 14:23

## 2023-12-06 RX ADMIN — FENTANYL CITRATE 50 MCG: 50 INJECTION, SOLUTION INTRAMUSCULAR; INTRAVENOUS at 14:27

## 2023-12-06 RX ADMIN — ONDANSETRON 4 MG: 2 INJECTION INTRAMUSCULAR; INTRAVENOUS at 14:23

## 2023-12-06 RX ADMIN — CEFAZOLIN 2 G: 2 INJECTION, POWDER, FOR SOLUTION INTRAMUSCULAR; INTRAVENOUS at 14:00

## 2023-12-06 RX ADMIN — ACETAMINOPHEN 1000 MG: 500 TABLET, FILM COATED ORAL at 19:49

## 2023-12-06 RX ADMIN — Medication 100 MCG: at 14:51

## 2023-12-06 RX ADMIN — 0.12% CHLORHEXIDINE GLUCONATE 15 ML: 1.2 RINSE ORAL at 12:54

## 2023-12-06 RX ADMIN — PROPOFOL 150 MG: 10 INJECTION, EMULSION INTRAVENOUS at 14:07

## 2023-12-06 RX ADMIN — ROCURONIUM BROMIDE 50 MG: 10 INJECTION, SOLUTION INTRAVENOUS at 14:03

## 2023-12-06 RX ADMIN — HYDROMORPHONE HYDROCHLORIDE 0.5 MG: 1 INJECTION, SOLUTION INTRAMUSCULAR; INTRAVENOUS; SUBCUTANEOUS at 16:11

## 2023-12-06 RX ADMIN — THIAMINE HYDROCHLORIDE 100 MG: 100 INJECTION, SOLUTION INTRAMUSCULAR; INTRAVENOUS at 19:49

## 2023-12-06 RX ADMIN — ACETAMINOPHEN 1000 MG: 1000 INJECTION INTRAVENOUS at 12:55

## 2023-12-06 RX ADMIN — GABAPENTIN 300 MG: 300 CAPSULE ORAL at 22:11

## 2023-12-06 RX ADMIN — SODIUM CHLORIDE, SODIUM LACTATE, POTASSIUM CHLORIDE, AND CALCIUM CHLORIDE 100 ML/HR: .6; .31; .03; .02 INJECTION, SOLUTION INTRAVENOUS at 12:55

## 2023-12-06 RX ADMIN — EPHEDRINE SULFATE 5 MG: 5 INJECTION INTRAVENOUS at 15:07

## 2023-12-06 RX ADMIN — GLYCOPYRROLATE 0.2 MCG: 0.2 INJECTION INTRAMUSCULAR; INTRAVENOUS at 14:24

## 2023-12-06 RX ADMIN — LIDOCAINE HYDROCHLORIDE 50 MG: 10 INJECTION, SOLUTION EPIDURAL; INFILTRATION; INTRACAUDAL; PERINEURAL at 14:03

## 2023-12-06 RX ADMIN — SUGAMMADEX 200 MG: 100 INJECTION, SOLUTION INTRAVENOUS at 15:24

## 2023-12-06 RX ADMIN — SPIRONOLACTONE 100 MG: 100 TABLET ORAL at 22:11

## 2023-12-06 RX ADMIN — HYOSCYAMINE SULFATE 125 MCG: 0.12 TABLET ORAL; SUBLINGUAL at 17:03

## 2023-12-06 RX ADMIN — SODIUM CHLORIDE, SODIUM LACTATE, POTASSIUM CHLORIDE, AND CALCIUM CHLORIDE: .6; .31; .03; .02 INJECTION, SOLUTION INTRAVENOUS at 15:25

## 2023-12-06 RX ADMIN — HYDROMORPHONE HYDROCHLORIDE 1 MG: 1 INJECTION, SOLUTION INTRAMUSCULAR; INTRAVENOUS; SUBCUTANEOUS at 16:32

## 2023-12-06 RX ADMIN — PROPOFOL 200 MG: 10 INJECTION, EMULSION INTRAVENOUS at 14:03

## 2023-12-06 RX ADMIN — SODIUM CHLORIDE, POTASSIUM CHLORIDE, SODIUM LACTATE AND CALCIUM CHLORIDE 500 ML: 600; 310; 30; 20 INJECTION, SOLUTION INTRAVENOUS at 12:55

## 2023-12-06 RX ADMIN — PANTOPRAZOLE SODIUM 40 MG: 40 INJECTION, POWDER, LYOPHILIZED, FOR SOLUTION INTRAVENOUS at 12:54

## 2023-12-06 NOTE — OP NOTE
LAPAROSCOPIC HIATAL HERNIA REPAIR WITH DAVINCI ROBOT  Procedure Report    Patient Name:  Tirso Palomo  YOB: 1960    Date of Surgery:  12/6/2023     Indications: 63-year-old lady with a history of gastric sleeve and previous hiatal hernia pair presents with recurrent hiatal hernia.    Pre-op Diagnosis:   Hiatal hernia with GERD [K44.9, K21.9]       Post-Op Diagnosis Codes:     * Hiatal hernia with GERD [K44.9, K21.9]    Procedure/CPT® Codes:      Procedure(s):  LAPAROSCOPIC HIATAL HERNIA REPAIR; LIGAMENTUN TERES CARDIOPEXY WITH DAVINCI ROBOT    Staff:  Surgeon(s):  Yue Jones MD    Assistant: Vicenta Dove APRN  Assistant: Vicenta Dove APRN   was responsible for performing the following activities: Retraction, Suction, Irrigation, Suturing, Closing, and Placing Dressing and their skilled assistance was necessary for the success of this case.   Anesthesia: General    Estimated Blood Loss: minimal    Implants:    Implant Name Type Inv. Item Serial No.  Lot No. LRB No. Used Action   DEV CLS WND VLOC/PBT JOSE NONABS 1/2CIR SZ2/0 27MM 15CM JOHANNE - [id number] Implant DEV CLS WND VLOC/PBT JOSE NONABS 1/2CIR SZ2/0 27MM 15CM JOHANNE  COVID[employer] [id number] N/A 2 Implanted   DEV CLS WND VLOC/PBT NONABS TPR/PT 1/[id number] SZ1 45CM JOHANNE - [id number] Implant DEV CLS WND VLOC/PBT NONABS TPR/PT 1/1YPA05YB SZ1 45CM JOHANNE  [employer] [id number] N/A 1 Implanted   DEV CLS WND VLOC/180 JOSE ABS 1/2CIR V20 SZ3/0 26MM 30CM GRN - [id number] Implant DEV CLS WND VLOC/180 JOSE ABS 1/2CIR V20 SZ3/0 26MM 30CM GRN  COVIDIEN [id number] N/A 1 Implanted       Specimen:          None        Findings: Hiatal hernia sliding-type 2    Complications: none    Description of Procedure: General endotracheal anesthesia was provided.  The abdomen was prepped and draped in a sterile fashion.  An Optiview technique was used to enter the peritoneum with an 8 mm trocar.  The peritoneum was entered and there was no evidence of injury.  The  peritoneum was insufflated to 15 mmHg.  A 12 port was placed in the right upper quadrant and another 8 mm trocar was placed at the midline.  Another 8 mm trocar was placed in the left upper quadrant.  The trocars were attached to the da Tess robot and I took control the surgical console.    I was able to retract the liver using a #1 V-Loc suture and placing a suture in the epigastrium and then the anterior hiatus in the right upper quadrant.  After this I began to mobilize the upper stomach from the surrounding right and left crura.  After mobilizing it posteriorly I then mobilized it anteriorly and also on the left side.  I also took down all scar tissue attached to the left side of the gastric sleeve to further mobilize the stomach.  After this was complete there was a 40 ViSiGi in place in the esophagus.  I then closed the posterior hiatus and then performed a gastropexy of the lower portion of the hiatus to the posterior stomach.  I also closed the anterior hiatus while the 40 [place] ViSiGi was in place.  I then performed an omentopexy with a 3-0 V-Loc suture throughout the length of the gastric staple line.  I then performed a ligamentum teres cardio pexy by mobilizing the falciform using the vessel sealer.  After was mobilized I sewed it to the anterior stomach with interrupted Ethibond 0 sutures.  After this all the trocars were removed and the skin was closed with Monocryl 4-0.      Yue Jones MD     Date: 12/6/2023  Time: 15:29 EST

## 2023-12-06 NOTE — ANESTHESIA PREPROCEDURE EVALUATION
Anesthesia Evaluation     Patient summary reviewed and Nursing notes reviewed   NPO Solid Status: > 8 hours  NPO Liquid Status: > 8 hours           Airway   Mallampati: II  TM distance: >3 FB  Neck ROM: full  No difficulty expected  Dental - normal exam     Pulmonary - normal exam   Cardiovascular - normal exam  Exercise tolerance: good (4-7 METS)    ECG reviewed    (+) hypertension, hyperlipidemia      Neuro/Psych  (+) numbness  GI/Hepatic/Renal/Endo    (+) obesity, hiatal hernia, GERD, thyroid problem     Musculoskeletal     Abdominal  - normal exam    Bowel sounds: normal.   Substance History      OB/GYN          Other   arthritis,                 Anesthesia Plan    ASA 2     general   total IV anesthesia    Anesthetic plan, risks, benefits, and alternatives have been provided, discussed and informed consent has been obtained with: patient.    Plan discussed with CRNA.    CODE STATUS:

## 2023-12-06 NOTE — PLAN OF CARE
Goal Outcome Evaluation:  Plan of Care Reviewed With: patient        Progress: no change  Outcome Evaluation: Pt admitted from PACU, oriented to room, spouse at bedside. VSS, no concerns at this time.

## 2023-12-07 VITALS
OXYGEN SATURATION: 99 % | DIASTOLIC BLOOD PRESSURE: 70 MMHG | WEIGHT: 218 LBS | TEMPERATURE: 97.3 F | HEART RATE: 59 BPM | BODY MASS INDEX: 34.14 KG/M2 | SYSTOLIC BLOOD PRESSURE: 111 MMHG | RESPIRATION RATE: 18 BRPM

## 2023-12-07 PROBLEM — K44.9 HIATAL HERNIA: Status: ACTIVE | Noted: 2023-12-07

## 2023-12-07 LAB
QT INTERVAL: 431 MS
QTC INTERVAL: 418 MS

## 2023-12-07 PROCEDURE — 25010000002 ENOXAPARIN PER 10 MG: Performed by: SURGERY

## 2023-12-07 PROCEDURE — G0378 HOSPITAL OBSERVATION PER HR: HCPCS

## 2023-12-07 PROCEDURE — 99024 POSTOP FOLLOW-UP VISIT: CPT | Performed by: SURGERY

## 2023-12-07 PROCEDURE — 25010000002 THIAMINE HCL 200 MG/2ML SOLUTION 2 ML VIAL: Performed by: SURGERY

## 2023-12-07 PROCEDURE — 25010000002 CYANOCOBALAMIN PER 1000 MCG: Performed by: SURGERY

## 2023-12-07 PROCEDURE — 25810000003 SODIUM CHLORIDE 0.9 % SOLUTION 1,000 ML FLEX CONT: Performed by: SURGERY

## 2023-12-07 RX ORDER — ACETAMINOPHEN 500 MG
1000 TABLET ORAL EVERY 6 HOURS PRN
Qty: 60 TABLET | Refills: 0 | Status: SHIPPED | OUTPATIENT
Start: 2023-12-07

## 2023-12-07 RX ADMIN — SPIRONOLACTONE 100 MG: 100 TABLET ORAL at 08:08

## 2023-12-07 RX ADMIN — CYANOCOBALAMIN 1000 MCG: 1000 INJECTION, SOLUTION INTRAMUSCULAR; SUBCUTANEOUS at 08:08

## 2023-12-07 RX ADMIN — ACETAMINOPHEN 1000 MG: 500 TABLET, FILM COATED ORAL at 00:55

## 2023-12-07 RX ADMIN — Medication 3 ML: at 08:08

## 2023-12-07 RX ADMIN — PANTOPRAZOLE SODIUM 40 MG: 40 TABLET, DELAYED RELEASE ORAL at 06:06

## 2023-12-07 RX ADMIN — ACETAMINOPHEN 1000 MG: 500 TABLET, FILM COATED ORAL at 06:06

## 2023-12-07 RX ADMIN — LEVOTHYROXINE SODIUM 75 MCG: 0.07 TABLET ORAL at 06:06

## 2023-12-07 RX ADMIN — ENOXAPARIN SODIUM 40 MG: 100 INJECTION SUBCUTANEOUS at 08:08

## 2023-12-07 RX ADMIN — THIAMINE HYDROCHLORIDE 100 ML/HR: 100 INJECTION, SOLUTION INTRAMUSCULAR; INTRAVENOUS at 00:55

## 2023-12-07 NOTE — DISCHARGE SUMMARY
Date of Discharge:  12/7/2023    Discharge Diagnosis: hiatal hernia repair    Presenting Problem/History of Present Illness  Active Hospital Problems    Diagnosis  POA    **Hiatal hernia with GERD [K44.9, K21.9]  Unknown    Hiatal hernia [K44.9]  Yes    Morbid obesity [E66.01]  Yes      Resolved Hospital Problems   No resolved problems to display.           Hospital Course  Patient is a 63 y.o. female presented with hiatal hernia. She underwent repair and discharged home the next day.      Procedures Performed    Procedure(s):  LAPAROSCOPIC HIATAL HERNIA REPAIR; LIGAMENTUN TERES CARDIOPEXY WITH DAVINCI ROBOT  -------------------       Consults:   Consults       No orders found for last 30 day(s).            Pertinent Test Results:     Condition on Discharge:  good    Vital Signs  Temp:  [96.8 °F (36 °C)-97.9 °F (36.6 °C)] 97.6 °F (36.4 °C)  Heart Rate:  [55-69] 55  Resp:  [12-25] 17  BP: ()/(47-80) 111/70    Physical Exam:     General Appearance:  Alert, cooperative, in no acute distress   Head:  Normocephalic, without obvious abnormality, atraumatic   Eyes:  Lids and lashes normal, conjunctivae and sclerae normal, no icterus, no pallor, corneas clear, PERRLA   Ears:  Ears appear intact with no abnormalities noted   Throat:  No oral lesions, no thrush, oral mucosa moist   Neck:  No adenopathy, supple, trachea midline, no thyromegaly, no carotid bruit, no JVD   Back:  No kyphosis present, no scoliosis present, no skin lesions, erythema or scars, no tenderness to percussion, or palpation, range of motion normal   Lungs:  Clear to auscultation,respirations regular, even and unlabored    Heart:  Regular rhythm and normal rate, normal S1 and S2, no murmur, no gallop, no rub, no click   Breast Exam:  Deferred   Abdomen:  Normal bowel sounds, no masses, no organomegaly, soft non-tender, non-distended, no guarding, no rebound tenderness   Genitalia:  Deferred   Extremities:  Moves all extremities well, no edema, no  cyanosis, no redness   Pulses:  Pulses palpable and equal bilaterally   Skin:  No bleeding, bruising or rash   Lymph nodes:  No palpable adenopathy   Neurologic:  Cranial nerves 2 - 12 grossly intact, sensation intact, DTR present and equal bilaterally       Discharge Disposition  Home or Self Care    Discharge Medications     Discharge Medications        New Medications        Instructions Start Date   Acetaminophen 500 MG pack   1,000 mg, Oral, Every 6 Hours PRN      omeprazole 40 MG capsule  Commonly known as: priLOSEC   40 mg, Oral, Daily             Continue These Medications        Instructions Start Date   cholecalciferol 25 MCG (1000 UT) tablet  Commonly known as: VITAMIN D3   1,000 Units, Oral, Daily      clobetasol 0.05 % external solution  Commonly known as: TEMOVATE   APPLY 4 DROPS ON THE SCALP AND GENTLY MASSAGE IN WITH CLEAN FINGERTIPS.      COLON CLEANSER PO   Oral, HOLD 5-7  days before surgery      Estrogens Conjugated 0.625 MG/GM vaginal cream  Commonly known as: PREMARIN   Vaginal, Daily, Use as directed 3 times a week for first week then start 2 times a week intravaginally. Only use for 2 weeks out of the month      famotidine 10 MG tablet  Commonly known as: PEPCID   10 mg, Oral, 2 Times Daily      levothyroxine 75 MCG tablet  Commonly known as: SYNTHROID, LEVOTHROID   75 mcg, Oral, Daily      Minoxidil 5 % foam   1 application , Apply externally, As Needed      multivitamin with minerals tablet tablet   1 tablet, Oral, Daily, Bariatric      polyethylene glycol 17 GM/SCOOP powder  Commonly known as: MiraLax   mix 1 capful (17 grams) in liquid and drink by mouth daily      vitamin E 100 UNIT capsule   100 Units, Oral, Daily, Nutrafol              Stop These Medications      metoprolol succinate XL 50 MG 24 hr tablet  Commonly known as: TOPROL-XL     spironolactone 100 MG tablet  Commonly known as: ALDACTONE              Discharge Diet:     Activity at Discharge:     Follow-up Appointments  Future  Appointments   Date Time Provider Department Center   12/11/2023 11:30 AM Oriana Mcmillan APRN [facility] None   3/20/2024  8:30 AM Oriana Mcmillan APRN [facility] None         Test Results Pending at Discharge       Yue Jones MD  12/07/23  10:44 EST    Time:

## 2023-12-07 NOTE — CASE MANAGEMENT/SOCIAL WORK
Case Management Discharge Note      Final Note: HOME         Selected Continued Care - Discharged on 12/7/2023 Admission date: 12/6/2023 - Discharge disposition: Home or Self Care                  Transportation Services  Private: Car    Final Discharge Disposition Code: 01 - home or self-care

## 2023-12-07 NOTE — PLAN OF CARE
Goal Outcome Evaluation:      Pt has been ambulating this shift. Ravinder clears diet. IVF continued. Potential d/c today.

## 2023-12-07 NOTE — PLAN OF CARE
Goal Outcome Evaluation:              Outcome Evaluation: Pt doing well this shift. c/o  minimal abd pain, treated per MAR. No s/sx of distress noted. Pt anticipates d/c home this shift. VSS call light in reach, plan of care on going.

## 2023-12-08 NOTE — ANESTHESIA POSTPROCEDURE EVALUATION
Patient: Tirso Palomo    Procedure Summary       Date: 12/06/23 Room / Location: [facility] OR 04 / [facility] MAIN OR    Anesthesia Start: 1357 Anesthesia Stop: 1543    Procedure: LAPAROSCOPIC HIATAL HERNIA REPAIR; LIGAMENTUN TERES CARDIOPEXY WITH DAVINCI ROBOT (Abdomen) Diagnosis:       Hiatal hernia with GERD      (Hiatal hernia with GERD [K44.9, K21.9])    Surgeons: Yue Jones MD Provider: Van Duran MD    Anesthesia Type: general ASA Status: 2            Anesthesia Type: general    Vitals  Vitals Value Taken Time   /73 12/06/23 1729   Temp 97.8 °F (36.6 °C) 12/06/23 1728   Pulse 66 12/06/23 1729   Resp 14 12/06/23 1728   SpO2 100 % 12/06/23 1729   Vitals shown include unfiled device data.        Post Anesthesia Care and Evaluation    Patient location during evaluation: PACU  Patient participation: complete - patient participated  Level of consciousness: awake  Pain scale: See nurse's notes for pain score.  Pain management: adequate    Airway patency: patent  Anesthetic complications: No anesthetic complications  PONV Status: none  Cardiovascular status: acceptable  Respiratory status: acceptable and spontaneous ventilation  Hydration status: acceptable    Comments: Patient seen and examined postoperatively; vital signs stable; SpO2 greater than or equal to 90%; cardiopulmonary status stable; nausea/vomiting adequately controlled; pain adequately controlled; no apparent anesthesia complications; patient discharged from anesthesia care when discharge criteria were met

## 2023-12-11 ENCOUNTER — TELEPHONE (OUTPATIENT)
Dept: FAMILY MEDICINE CLINIC | Facility: CLINIC | Age: 63
End: 2023-12-11
Payer: COMMERCIAL

## 2023-12-11 ENCOUNTER — OFFICE VISIT (OUTPATIENT)
Dept: BARIATRICS/WEIGHT MGMT | Facility: CLINIC | Age: 63
End: 2023-12-11
Payer: COMMERCIAL

## 2023-12-11 VITALS
SYSTOLIC BLOOD PRESSURE: 162 MMHG | HEART RATE: 64 BPM | HEIGHT: 67 IN | DIASTOLIC BLOOD PRESSURE: 100 MMHG | BODY MASS INDEX: 33.81 KG/M2 | OXYGEN SATURATION: 99 % | WEIGHT: 215.4 LBS

## 2023-12-11 DIAGNOSIS — Z51.89 VISIT FOR WOUND CHECK: ICD-10-CM

## 2023-12-11 DIAGNOSIS — E66.9 OBESITY, CLASS I, BMI 30-34.9: ICD-10-CM

## 2023-12-11 DIAGNOSIS — Z87.19 HISTORY OF REPAIR OF HIATAL HERNIA: Primary | ICD-10-CM

## 2023-12-11 DIAGNOSIS — Z98.890 HISTORY OF REPAIR OF HIATAL HERNIA: Primary | ICD-10-CM

## 2023-12-11 PROCEDURE — 99024 POSTOP FOLLOW-UP VISIT: CPT | Performed by: NURSE PRACTITIONER

## 2023-12-11 RX ORDER — FLUCONAZOLE 150 MG/1
150 TABLET ORAL ONCE
Qty: 1 TABLET | Refills: 0 | Status: SHIPPED | OUTPATIENT
Start: 2023-12-11 | End: 2023-12-11

## 2023-12-11 NOTE — TELEPHONE ENCOUNTER
Following message received from [employer]:    During recent hospital visit was told to stop Metoprolol 50 mg and discussB/P and HR with provider.  Please advise if an appt is needed or phone visit to discuss. Thank you     No appointments available within TCM timeframe. Please advise

## 2023-12-11 NOTE — PROGRESS NOTES
"[facility]  [address] [place] [address]  [address] [place] [address]  Dept: [phone]  12/11/2023      Tirso Palomo.  [id number]  [id number]  1960  female    Date of last surgery: 12/6/2023Laparoscopic Hiatal Hernia Repair; Ligamentun Teres Cardiopexy With Davinci Robot      Chief Complaint: BH Post-Op Bariatric Surgery:   Tirso Palomo is status post procedure listed above  HPI:     Wt Readings from Last 10 Encounters:   12/11/23 97.7 kg (215 lb 6.4 oz)   12/06/23 98.9 kg (218 lb)   12/04/23 99.7 kg (219 lb 12.8 oz)   11/30/23 100 kg (221 lb 1.9 oz)   11/27/23 99.3 kg (219 lb)   10/19/23 98.3 kg (216 lb 12.8 oz)   09/29/23 98.2 kg (216 lb 9.6 oz)   07/05/23 98.9 kg (218 lb)   06/21/23 98 kg (216 lb)   06/05/23 99 kg (218 lb 3.2 oz)   1 week hiatal hernia repair      Today's weight is 97.7 kg (215 lb 6.4 oz) pounds,BMI 33.74 has a  loss of 3 pounds since the last visit and weight loss since sleeve surgery is 30 pounds. The patient reports a decreased portion size and loss of appetite.      Tirso Palomo denies nausea or vomiting, or GERD      Diet and Exercise: Diet history reviewed and discussed with the patient. Weight loss/gains to date discussed with the patient.       Cream of chicken soup, protein shakes, 1/2 cut tea, water ,milk   Minimal abdominal pain, minimal nausea, no vomiting, \"having some redness in groin area\"      Review of Systems   Constitutional:  Positive for activity change and appetite change.   Cardiovascular: Negative.    Gastrointestinal: Negative.    Musculoskeletal:  Positive for myalgias.         Patient Active Problem List   Diagnosis    Chronic fatigue    Essential hypertension    Snoring    Hemorrhoids    Multiple joint pain    Acquired hypothyroidism    Screening mammogram, encounter for    Screening for cervical cancer    Screening for colon cancer    Screening for " hyperlipidemia    Encounter for general adult medical examination with abnormal findings    Vitamin D deficiency    Urinary incontinence    Stress incontinence of urine    Spondylosis of lumbosacral spine without myelopathy    Class 1 obesity due to excess calories with serious comorbidity and body mass index (BMI) of 34.0 to 34.9 in adult    Menopause present    Hypermobility of urethra    History of colonic polyps    Female bladder prolapse    Hormone replacement therapy    Abscess of right breast    Normal pelvic exam    Epidermal inclusion cyst    Status post sleeve gastrectomy    Chronic idiopathic constipation    History of tobacco use    Vaginal dryness    Alopecia    Change in bowel habit    Family history of colon cancer    H/O bariatric surgery    Obesity, Class I, BMI 30-34.9    Morbid obesity    Hiatal hernia with GERD    Hiatal hernia       Past Medical History:   Diagnosis Date    Allergic     Arthritis     Elevated cholesterol     Hiatal hernia     Hiatal hernia with GERD 12/4/2023    Hypertension     Hypothyroidism     Insomnia     Sciatica      Past Surgical History:   Procedure Laterality Date    TUBAL ABDOMINAL LIGATION  1993    BREAST SURGERY Right 2012    biopsy    COLONOSCOPY  2018 2018 2023    ENDOSCOPY N/A 02/09/2023    Procedure: ESOPHAGOGASTRODUODENOSCOPY with gastric biopsy;  Surgeon: Yue Jones MD;  Location: [facility] ENDOSCOPY;  Service: General;  Laterality: N/A;  normal    GASTRIC SLEEVE LAPAROSCOPIC N/A 03/16/2023    Procedure: GASTRIC SLEEVE LAPAROSCOPIC WITH DAVINCI ROBOT; HIATAL HERNIA REPAIR;  Surgeon: Yue Jones MD;  Location: [facility] MAIN OR;  Service: Robotics - DaVinci;  Laterality: N/A;    ENDOSCOPY N/A 11/30/2023    Procedure: ESOPHAGOGASTRODUODENOSCOPY with esophageal balloon dilation to 20mm;  Surgeon: Yue Jones MD;  Location: [facility] ENDOSCOPY;  Service: General;  Laterality: N/A;  stricture of gastric body    HIATAL HERNIA REPAIR N/A 12/6/2023    Procedure:  LAPAROSCOPIC HIATAL HERNIA REPAIR; LIGAMENTUN TERES CARDIOPEXY WITH DAVINCI ROBOT;  Surgeon: Yue Jones MD;  Location: [facility] MAIN OR;  Service: Robotics - DaVinci;  Laterality: N/A;    TOENAIL EXCISION      01/2022      The following portions of the patient's history were reviewed and updated as appropriate: allergies, current medications, past medical history, past social history, past surgical history, and problem list.    Vitals:    12/11/23 1117   BP: 162/100   Pulse: 64   SpO2: 99%       Physical Exam  Constitutional:       Appearance: Normal appearance. She is obese.   Pulmonary:      Effort: Pulmonary effort is normal.   Abdominal:      General: Abdomen is flat.      Palpations: Abdomen is soft.      Comments: Incisions clean, dry and intact    Skin:     General: Skin is warm and dry.   Neurological:      General: No focal deficit present.      Mental Status: She is alert and oriented to person, place, and time.   Psychiatric:         Mood and Affect: Mood normal.         Behavior: Behavior normal.         Thought Content: Thought content normal.         Judgment: Judgment normal.             Assessment:      1 week hiatal hernia repair   Post-op, the patient is doing well. She is tolerating po fluids and protein shakes well. Diet progression reviewed with pt today. Ok to continue with 1 week full liquids and 1 week mushy foods before going back on solid foods. Minimal abdominal pain reported and only with movement. Incisions clean dry and intact. Pt did reports a raw , red feeling in her groin area and states she is prone to yeast infections post surgery. Will send in 1 dose diflucan today for pt. Encourage pt to follow up with pcp if this does not help. Plan to follow up for next scheduled bariatric follow up in March.      Plan:     Encouraged patient to be sure to get plenty of lean protein per day through small frequent meals all with a protein source.   Activity restrictions: none.   Recommended  patient be sure to get at least 70 grams of protein per day by eating small, frequent meals all with high lean protein choices. Be sure to limit/cut back on daily carbohydrate intake. Discussed with the patient the recommended amount of water per day to intake- half of body weight in ounces. Reviewed vitamin requirements. Be sure to do routine exercise, 150 minutes per week minimum, including both cardio and strength training.     Instructions / Recommendations: dietary counseling recommended, recommended a daily protein intake of  grams, vitamin supplement(s) recommended, recommended exercising at least 150 minutes per week, behavior modifications recommended and instructed to call the office for concerns, questions, or problems.     The patient was instructed to follow up in 3 months.     The patient was counseled regarding diet and exercise. Total time spent face to face was 20 minutes and 15 minutes was spent counseling.     DARINEL Pompa  [facility]

## 2023-12-15 ENCOUNTER — OFFICE VISIT (OUTPATIENT)
Dept: FAMILY MEDICINE CLINIC | Facility: CLINIC | Age: 63
End: 2023-12-15
Payer: COMMERCIAL

## 2023-12-15 VITALS
SYSTOLIC BLOOD PRESSURE: 130 MMHG | HEART RATE: 100 BPM | OXYGEN SATURATION: 98 % | TEMPERATURE: 97.3 F | HEIGHT: 67 IN | WEIGHT: 214.2 LBS | DIASTOLIC BLOOD PRESSURE: 82 MMHG | BODY MASS INDEX: 33.62 KG/M2 | RESPIRATION RATE: 18 BRPM

## 2023-12-15 DIAGNOSIS — I10 ESSENTIAL HYPERTENSION: Primary | ICD-10-CM

## 2023-12-15 DIAGNOSIS — Z87.891 HISTORY OF TOBACCO USE: ICD-10-CM

## 2023-12-15 DIAGNOSIS — Z23 INFLUENZA VACCINE NEEDED: ICD-10-CM

## 2023-12-15 DIAGNOSIS — E66.09 CLASS 1 OBESITY DUE TO EXCESS CALORIES WITH SERIOUS COMORBIDITY AND BODY MASS INDEX (BMI) OF 34.0 TO 34.9 IN ADULT: ICD-10-CM

## 2023-12-15 DIAGNOSIS — Z12.31 SCREENING MAMMOGRAM, ENCOUNTER FOR: ICD-10-CM

## 2023-12-15 RX ORDER — LOSARTAN POTASSIUM 25 MG/1
12.5 TABLET ORAL DAILY
Qty: 30 TABLET | Refills: 1 | Status: SHIPPED | OUTPATIENT
Start: 2023-12-15 | End: 2023-12-19 | Stop reason: SDUPTHER

## 2023-12-15 NOTE — PROGRESS NOTES
Subjective   Tirso Palomo is a 63 y.o. female. Presents to [facility]    Chief Complaint   Patient presents with    Hypertension       Hypertension  This is a chronic problem. The current episode started more than 1 year ago. Pertinent negatives include no chest pain, malaise/fatigue or shortness of breath. Risk factors for coronary artery disease include obesity, post-menopausal state and family history. The current treatment provides mild improvement.        I personally reviewed and updated the patient's allergies, medications, problem list, and past medical, surgical, social, and family history. I have reviewed and confirmed the accuracy of the History of Present Illness and Review of Symptoms as documented by the MA/LPN/RN. Miryam Martinez MD    Allergies:  Allergies   Allergen Reactions    Benadryl [Diphenhydramine] Other (See Comments)     Makes her too sleepy    Lisinopril Cough    Topamax [Topiramate] GI Intolerance    Actigall [Ursodiol] Other (See Comments)     Burning in lining of stomach     Amlodipine Palpitations       Social History:  Social History     Socioeconomic History    Marital status:    Tobacco Use    Smoking status: Former     Packs/day: 0.25     Years: 30.00     Additional pack years: 0.00     Total pack years: 7.50     Types: Cigarettes     Start date: 1990     Quit date: 2010     Years since quittin.4     Passive exposure: Past    Smokeless tobacco: Never    Tobacco comments:     Quit cold turkey   Vaping Use    Vaping Use: Never used   Substance and Sexual Activity    Alcohol use: No    Drug use: No    Sexual activity: Yes     Partners: Male     Birth control/protection: Post-menopausal       Family History:  Family History   Problem Relation Age of Onset    Cancer Mother         breast    Hypertension Father     Heart attack Father     Cancer Father         prostate    Obesity Brother     Hypertension Brother     Arthritis Brother     Breast  cancer Maternal Aunt     Heart disease Maternal Grandmother     Diabetes Maternal Grandmother     Obesity Maternal Grandfather     Stroke Maternal Grandfather     Hypertension Maternal Grandfather     Cancer Paternal Grandmother     Stroke Paternal Grandfather     Heart attack Paternal Grandfather     Hypertension Paternal Grandfather        Past Medical History :  Patient Active Problem List   Diagnosis    Chronic fatigue    Essential hypertension    Snoring    Hemorrhoids    Multiple joint pain    Acquired hypothyroidism    Screening mammogram, encounter for    Screening for cervical cancer    Screening for colon cancer    Screening for hyperlipidemia    Encounter for general adult medical examination with abnormal findings    Vitamin D deficiency    Urinary incontinence    Stress incontinence of urine    Spondylosis of lumbosacral spine without myelopathy    Class 1 obesity due to excess calories with serious comorbidity and body mass index (BMI) of 34.0 to 34.9 in adult    Menopause present    Hypermobility of urethra    History of colonic polyps    Female bladder prolapse    Hormone replacement therapy    Abscess of right breast    Normal pelvic exam    Epidermal inclusion cyst    Status post sleeve gastrectomy    Chronic idiopathic constipation    History of tobacco use    Vaginal dryness    Alopecia    Change in bowel habit    Family history of colon cancer    H/O bariatric surgery    Obesity, Class I, BMI 30-34.9    Morbid obesity    Hiatal hernia with GERD    Hiatal hernia    Influenza vaccine needed       Medication List:    Current Outpatient Medications:     acetaminophen (TYLENOL) 500 MG tablet, Take 2 tablets by mouth Every 6 (Six) Hours As Needed (pain)., Disp: 60 tablet, Rfl: 0    cholecalciferol (VITAMIN D3) 25 MCG (1000 UT) tablet, Take 1 tablet by mouth Daily., Disp: , Rfl:     clobetasol (TEMOVATE) 0.05 % external solution, APPLY 4 DROPS ON THE SCALP AND GENTLY MASSAGE IN WITH CLEAN FINGERTIPS.,  Disp: , Rfl:     Estrogens Conjugated (PREMARIN) 0.625 MG/GM vaginal cream, Insert  into the vagina Daily. Use as directed 3 times a week for first week then start 2 times a week intravaginally. Only use for 2 weeks out of the month, Disp: 42.5 g, Rfl: 12    famotidine (PEPCID) 10 MG tablet, Take 1 tablet by mouth 2 (Two) Times a Day., Disp: , Rfl:     levothyroxine (SYNTHROID, LEVOTHROID) 75 MCG tablet, Take 1 tablet by mouth Daily., Disp: 90 tablet, Rfl: 1    Minoxidil 5 % foam, Apply 1 application topically As Needed., Disp: , Rfl:     Misc Natural Products (COLON CLEANSER PO), Take  by mouth. HOLD 5-7  days before surgery, Disp: , Rfl:     multivitamin with minerals tablet tablet, Take 1 tablet by mouth Daily. Bariatric, Disp: , Rfl:     omeprazole (priLOSEC) 40 MG capsule, Take 1 capsule by mouth Daily., Disp: 30 capsule, Rfl: 6    polyethylene glycol (MiraLax) 17 GM/SCOOP powder, mix 1 capful (17 grams) in liquid and drink by mouth daily, Disp: 510 g, Rfl: 1    vitamin E 100 UNIT capsule, Take 1 capsule by mouth Daily. Nutrafol, Disp: , Rfl:     losartan (COZAAR) 25 MG tablet, Take 0.5 tablets by mouth Daily., Disp: 30 tablet, Rfl: 1    Past Surgical History:  Past Surgical History:   Procedure Laterality Date    BREAST SURGERY Right 2012    biopsy    COLONOSCOPY  2018 2018 2023    ENDOSCOPY N/A 02/09/2023    Procedure: ESOPHAGOGASTRODUODENOSCOPY with gastric biopsy;  Surgeon: Yue Jones MD;  Location: [facility] ENDOSCOPY;  Service: General;  Laterality: N/A;  normal    ENDOSCOPY N/A 11/30/2023    Procedure: ESOPHAGOGASTRODUODENOSCOPY with esophageal balloon dilation to 20mm;  Surgeon: Yue Jones MD;  Location: [facility] ENDOSCOPY;  Service: General;  Laterality: N/A;  stricture of gastric body    GASTRIC SLEEVE LAPAROSCOPIC N/A 03/16/2023    Procedure: GASTRIC SLEEVE LAPAROSCOPIC WITH DAVINCI ROBOT; HIATAL HERNIA REPAIR;  Surgeon: Yue Jones MD;  Location: [facility] MAIN OR;  Service: Robotics - [employer];   "Laterality: N/A;    HIATAL HERNIA REPAIR N/A 12/6/2023    Procedure: LAPAROSCOPIC HIATAL HERNIA REPAIR; LIGAMENTUN TERES CARDIOPEXY WITH [employer] ROBOT;  Surgeon: Yue Jones MD;  [facility] MAIN [facility];  Service: Robotics - [employer];  Laterality: N/A;    TOENAIL EXCISION      01/2022    TUBAL ABDOMINAL LIGATION  1993         Physical Exam:      Vital Signs:    Vitals:    12/15/23 1347   BP: 130/82   Pulse: 100   Resp: 18   Temp: 97.3 °F (36.3 °C)   SpO2: 98%        /82 (BP Location: Right arm, Patient Position: Sitting, Cuff Size: Adult)   Pulse 100   Temp 97.3 °F (36.3 °C) (Temporal)   Resp 18   Ht 170.2 cm (67\")   Wt 97.2 kg (214 lb 3.2 oz)   LMP  (LMP Unknown)   SpO2 98% Comment: room air  BMI 33.55 kg/m²     Wt Readings from Last 3 Encounters:   12/15/23 97.2 kg (214 lb 3.2 oz)   12/11/23 97.7 kg (215 lb 6.4 oz)   12/06/23 98.9 kg (218 lb)       Result Review :                Physical Exam  Vitals reviewed.   Constitutional:       Appearance: Normal appearance. She is well-developed.   HENT:      Head: Normocephalic and atraumatic.   Eyes:      General:         Right eye: No discharge.         Left eye: No discharge.   Cardiovascular:      Rate and Rhythm: Normal rate and regular rhythm.      Heart sounds: Normal heart sounds. No murmur heard.     No friction rub. No gallop.   Pulmonary:      Effort: Pulmonary effort is normal. No respiratory distress.      Breath sounds: Normal breath sounds. No wheezing or rales.   Skin:     General: Skin is warm and dry.      Findings: No rash.   Neurological:      Mental Status: She is alert and oriented to person, place, and time.      Coordination: Coordination normal.      Gait: Gait normal.   Psychiatric:         Behavior: Behavior is cooperative.         Assessment and Plan:  Problems Addressed this Visit          Cardiac and Vasculature    Essential hypertension - Primary     Hypertension is  labile .  Dietary sodium restriction.  Ambulatory blood " pressure monitoring.  Blood pressure will be reassessed at the next regular appointment.  Start low dose losartan if her BP remains above or at 140/90            Endocrine and Metabolic    Class 1 obesity due to excess calories with serious comorbidity and body mass index (BMI) of 34.0 to 34.9 in adult       Genitourinary and Reproductive     Screening mammogram, encounter for    Relevant Orders    Mammo Screening Digital Tomosynthesis Bilateral With CAD       Tobacco    History of tobacco use       Other    Influenza vaccine needed    Relevant Orders    Fluzone (or Fluarix & Flulaval for VFC) >6mos     Diagnoses         Codes Comments    Essential hypertension    -  Primary ICD-10-CM: I10  ICD-9-CM: 401.9     Class 1 obesity due to excess calories with serious comorbidity and body mass index (BMI) of 34.0 to 34.9 in adult     ICD-10-CM: E66.09, Z68.34  ICD-9-CM: 278.00, V85.34     History of tobacco use     ICD-10-CM: Z87.891  ICD-9-CM: V15.82     Influenza vaccine needed     ICD-10-CM: Z23  ICD-9-CM: V04.81     Screening mammogram, encounter for     ICD-10-CM: Z12.31  ICD-9-CM: V76.12                     An After Visit Summary and PPPS were given to the patient.

## 2023-12-15 NOTE — ASSESSMENT & PLAN NOTE
Hypertension is  labile .  Dietary sodium restriction.  Ambulatory blood pressure monitoring.  Blood pressure will be reassessed at the next regular appointment.  Start low dose losartan if her BP remains above or at 140/90

## 2023-12-19 DIAGNOSIS — I10 ESSENTIAL HYPERTENSION: ICD-10-CM

## 2023-12-19 RX ORDER — LOSARTAN POTASSIUM 25 MG/1
12.5 TABLET ORAL DAILY
Qty: 30 TABLET | Refills: 1 | Status: SHIPPED | OUTPATIENT
Start: 2023-12-19

## 2023-12-29 ENCOUNTER — HOSPITAL ENCOUNTER (OUTPATIENT)
Dept: MAMMOGRAPHY | Facility: HOSPITAL | Age: 63
Discharge: HOME | End: 2023-12-29
Admitting: FAMILY MEDICINE
Payer: COMMERCIAL

## 2023-12-29 DIAGNOSIS — Z12.31 SCREENING MAMMOGRAM, ENCOUNTER FOR: ICD-10-CM

## 2023-12-29 PROCEDURE — 77067 SCR MAMMO BI INCL CAD: CPT

## 2023-12-29 PROCEDURE — 77063 BREAST TOMOSYNTHESIS BI: CPT

## 2024-01-02 NOTE — PROGRESS NOTES
Subjective   Tirso Palomo is a 63 y.o. female. Presents to [facility]    Chief Complaint   Patient presents with    Hypertension       Hypertension  This is a chronic problem. The current episode started more than 1 year ago. The problem is uncontrolled. Associated symptoms include headaches. Pertinent negatives include no chest pain, malaise/fatigue, palpitations or shortness of breath. Risk factors for coronary artery disease include obesity, post-menopausal state and family history. Current antihypertension treatment includes angiotensin blockers. The current treatment provides mild improvement.        I personally reviewed and updated the patient's allergies, medications, problem list, and past medical, surgical, social, and family history. I have reviewed and confirmed the accuracy of the History of Present Illness and Review of Symptoms as documented by the MA/LPN/RN. Miryam Martinez MD    Allergies:  Allergies   Allergen Reactions    Benadryl [Diphenhydramine] Other (See Comments)     Makes her too sleepy    Lisinopril Cough    Topamax [Topiramate] GI Intolerance    Actigall [Ursodiol] Other (See Comments)     Burning in lining of stomach     Amlodipine Palpitations       Social History:  Social History     Socioeconomic History    Marital status:    Tobacco Use    Smoking status: Former     Packs/day: 0.25     Years: 30.00     Additional pack years: 0.00     Total pack years: 7.50     Types: Cigarettes     Start date: 1990     Quit date: 2010     Years since quittin.4     Passive exposure: Past    Smokeless tobacco: Never    Tobacco comments:     Quit cold turkey   Vaping Use    Vaping Use: Never used   Substance and Sexual Activity    Alcohol use: No    Drug use: No    Sexual activity: Yes     Partners: Male     Birth control/protection: Post-menopausal       Family History:  Family History   Problem Relation Age of Onset    Breast cancer Mother     Cancer Mother          breast    Hypertension Father     Heart attack Father     Cancer Father         prostate    Obesity Brother     Hypertension Brother     Arthritis Brother     Heart disease Maternal Grandmother     Diabetes Maternal Grandmother     Cancer Paternal Grandmother     Breast cancer Maternal Aunt     Obesity Maternal Grandfather     Stroke Maternal Grandfather     Hypertension Maternal Grandfather     Stroke Paternal Grandfather     Heart attack Paternal Grandfather     Hypertension Paternal Grandfather        Past Medical History :  Patient Active Problem List   Diagnosis    Chronic fatigue    Essential hypertension    Snoring    Hemorrhoids    Multiple joint pain    Acquired hypothyroidism    Screening mammogram, encounter for    Screening for cervical cancer    Screening for colon cancer    Encounter for general adult medical examination with abnormal findings    Vitamin D deficiency    Urinary incontinence    Stress incontinence of urine    Spondylosis of lumbosacral spine without myelopathy    Class 1 obesity due to excess calories with serious comorbidity and body mass index (BMI) of 34.0 to 34.9 in adult    Menopause present    Hypermobility of urethra    History of colonic polyps    Female bladder prolapse    Hormone replacement therapy    Abscess of right breast    Normal pelvic exam    Epidermal inclusion cyst    Status post sleeve gastrectomy    Chronic idiopathic constipation    History of tobacco use    Vaginal dryness    Alopecia    Change in bowel habit    Family history of colon cancer    H/O bariatric surgery    Morbid obesity    Hiatal hernia with GERD    Hiatal hernia    Influenza vaccine needed    Dyslipidemia       Medication List:    Current Outpatient Medications:     acetaminophen (TYLENOL) 500 MG tablet, Take 2 tablets by mouth Every 6 (Six) Hours As Needed (pain)., Disp: 60 tablet, Rfl: 0    cholecalciferol (VITAMIN D3) 25 MCG (1000 UT) tablet, Take 1 tablet by mouth Daily., Disp: , Rfl:      clobetasol (TEMOVATE) 0.05 % external solution, APPLY 4 DROPS ON THE SCALP AND GENTLY MASSAGE IN WITH CLEAN FINGERTIPS., Disp: , Rfl:     Estrogens Conjugated (PREMARIN) 0.625 MG/GM vaginal cream, Insert  into the vagina Daily. Use as directed 3 times a week for first week then start 2 times a week intravaginally. Only use for 2 weeks out of the month, Disp: 42.5 g, Rfl: 12    famotidine (PEPCID) 10 MG tablet, Take 1 tablet by mouth 2 (Two) Times a Day., Disp: , Rfl:     levothyroxine (SYNTHROID, LEVOTHROID) 75 MCG tablet, Take 1 tablet by mouth Daily., Disp: 90 tablet, Rfl: 1    losartan (COZAAR) 25 MG tablet, Take 1 tablet by mouth Daily., Disp: 30 tablet, Rfl: 12    Minoxidil 5 % foam, Apply 1 application topically As Needed., Disp: , Rfl:     Misc Natural Products (COLON CLEANSER PO), Take  by mouth. HOLD 5-7  days before surgery, Disp: , Rfl:     multivitamin with minerals tablet tablet, Take 1 tablet by mouth Daily. Bariatric, Disp: , Rfl:     omeprazole (priLOSEC) 40 MG capsule, Take 1 capsule by mouth Daily., Disp: 30 capsule, Rfl: 6    polyethylene glycol (MiraLax) 17 GM/SCOOP powder, mix 1 capful (17 grams) in liquid and drink by mouth daily, Disp: 510 g, Rfl: 1    vitamin E 100 UNIT capsule, Take 1 capsule by mouth Daily. Nutrafol, Disp: , Rfl:     Past Surgical History:  Past Surgical History:   Procedure Laterality Date    BREAST SURGERY Right 2012    biopsy    COLONOSCOPY  2018 2018 2023    ENDOSCOPY N/A 02/09/2023    Procedure: ESOPHAGOGASTRODUODENOSCOPY with gastric biopsy;  Surgeon: Yue Jones MD;  Location: [facility] ENDOSCOPY;  Service: General;  Laterality: N/A;  normal    ENDOSCOPY N/A 11/30/2023    Procedure: ESOPHAGOGASTRODUODENOSCOPY with esophageal balloon dilation to 20mm;  Surgeon: Yue Jones MD;  Location: [facility] ENDOSCOPY;  Service: General;  Laterality: N/A;  stricture of gastric body    GASTRIC SLEEVE LAPAROSCOPIC N/A 03/16/2023    Procedure: GASTRIC SLEEVE LAPAROSCOPIC WITH  "DA[employer]INCI ROBOT; HIATAL HERNIA REPAIR;  Surgeon: Yue Jones MD;  Location: [facility] MAIN OR;  Service: Robotics - [employer];  Laterality: N/A;    HIATAL HERNIA REPAIR N/A 12/6/2023    Procedure: LAPAROSCOPIC HIATAL HERNIA REPAIR; LIGAMENTUN TERES CARDIOPEXY WITH DAVINCI ROBOT;  Surgeon: Yue Jones MD;  Location: [facility] MAIN OR;  Service: Robotics - [employer];  Laterality: N/A;    TOENAIL EXCISION      01/2022    TUBAL ABDOMINAL LIGATION  1993         Physical Exam:      Vital Signs:    Vitals:    01/08/24 1218   BP: 138/90   Pulse:    Resp:    Temp:    SpO2:         /90   Pulse 83   Temp 97.7 °F (36.5 °C) (Temporal)   Resp 18   Ht 170.2 cm (67\")   Wt 99.4 kg (219 lb 3.2 oz)   LMP  (LMP Unknown)   SpO2 98% Comment: room air  BMI 34.33 kg/m²     Wt Readings from Last 3 Encounters:   01/08/24 99.4 kg (219 lb 3.2 oz)   12/15/23 97.2 kg (214 lb 3.2 oz)   12/11/23 97.7 kg (215 lb 6.4 oz)       Result Review :                Physical Exam  Vitals reviewed.   Constitutional:       Appearance: Normal appearance. She is well-developed.   HENT:      Head: Normocephalic and atraumatic.   Eyes:      General:         Right eye: No discharge.         Left eye: No discharge.   Cardiovascular:      Rate and Rhythm: Normal rate and regular rhythm.      Heart sounds: Normal heart sounds. No murmur heard.     No friction rub. No gallop.   Pulmonary:      Effort: Pulmonary effort is normal. No respiratory distress.      Breath sounds: Normal breath sounds. No wheezing or rales.   Skin:     General: Skin is warm and dry.      Findings: No rash.   Neurological:      Mental Status: She is alert and oriented to person, place, and time.      Coordination: Coordination normal.      Gait: Gait normal.   Psychiatric:         Behavior: Behavior is cooperative.         Assessment and Plan:  Problems Addressed this Visit          Cardiac and Vasculature    Essential hypertension - Primary     Hypertension is worsening.  Dietary sodium " restriction.  Weight loss.  Medication changes per orders.  Blood pressure will be reassessed in 4 weeks.  Blood pressure is not at goal. Increase losartan to 25mg from 12.5mg    Diagnosis, treatment and and course discussed. Potential side effects discussed. Return if there is worsening or persistence of symptoms.            Relevant Medications    losartan (COZAAR) 25 MG tablet    Other Relevant Orders    Comprehensive Metabolic Panel    CBC & Differential    TSH    Magnesium    Dyslipidemia    Relevant Orders    Comprehensive Metabolic Panel    Lipid Panel With / Chol / HDL Ratio       Endocrine and Metabolic    Acquired hypothyroidism    Vitamin D deficiency     She has history of gastric surgery.   Will check vitamin D         Relevant Orders    Vitamin D,25-Hydroxy    Class 1 obesity due to excess calories with serious comorbidity and body mass index (BMI) of 34.0 to 34.9 in adult    H/O bariatric surgery    Relevant Orders    CBC & Differential    Magnesium       Tobacco    History of tobacco use     Diagnoses         Codes Comments    Essential hypertension    -  Primary ICD-10-CM: I10  ICD-9-CM: 401.9     Class 1 obesity due to excess calories with serious comorbidity and body mass index (BMI) of 34.0 to 34.9 in adult     ICD-10-CM: E66.09, Z68.34  ICD-9-CM: 278.00, V85.34     History of tobacco use     ICD-10-CM: Z87.891  ICD-9-CM: V15.82     Dyslipidemia     ICD-10-CM: E78.5  ICD-9-CM: 272.4     Acquired hypothyroidism     ICD-10-CM: E03.9  ICD-9-CM: 244.9     Vitamin D deficiency     ICD-10-CM: E55.9  ICD-9-CM: 268.9     H/O bariatric surgery     ICD-10-CM: Z98.84  ICD-9-CM: V45.86                     An After Visit Summary and PPPS were given to the patient.

## 2024-01-08 ENCOUNTER — OFFICE VISIT (OUTPATIENT)
Dept: FAMILY MEDICINE CLINIC | Facility: CLINIC | Age: 64
End: 2024-01-08
Payer: COMMERCIAL

## 2024-01-08 VITALS
SYSTOLIC BLOOD PRESSURE: 138 MMHG | WEIGHT: 219.2 LBS | HEIGHT: 67 IN | DIASTOLIC BLOOD PRESSURE: 90 MMHG | RESPIRATION RATE: 18 BRPM | BODY MASS INDEX: 34.4 KG/M2 | OXYGEN SATURATION: 98 % | HEART RATE: 83 BPM | TEMPERATURE: 97.7 F

## 2024-01-08 DIAGNOSIS — E03.9 ACQUIRED HYPOTHYROIDISM: ICD-10-CM

## 2024-01-08 DIAGNOSIS — Z87.891 HISTORY OF TOBACCO USE: ICD-10-CM

## 2024-01-08 DIAGNOSIS — Z98.84 H/O BARIATRIC SURGERY: ICD-10-CM

## 2024-01-08 DIAGNOSIS — E78.5 DYSLIPIDEMIA: ICD-10-CM

## 2024-01-08 DIAGNOSIS — E66.09 CLASS 1 OBESITY DUE TO EXCESS CALORIES WITH SERIOUS COMORBIDITY AND BODY MASS INDEX (BMI) OF 34.0 TO 34.9 IN ADULT: ICD-10-CM

## 2024-01-08 DIAGNOSIS — E55.9 VITAMIN D DEFICIENCY: ICD-10-CM

## 2024-01-08 DIAGNOSIS — I10 ESSENTIAL HYPERTENSION: Primary | ICD-10-CM

## 2024-01-08 PROBLEM — Z13.220 SCREENING FOR HYPERLIPIDEMIA: Status: RESOLVED | Noted: 2021-04-13 | Resolved: 2024-01-08

## 2024-01-08 PROBLEM — E66.811 OBESITY, CLASS I, BMI 30-34.9: Status: RESOLVED | Noted: 2023-11-27 | Resolved: 2024-01-08

## 2024-01-08 PROBLEM — E66.9 OBESITY, CLASS I, BMI 30-34.9: Status: RESOLVED | Noted: 2023-11-27 | Resolved: 2024-01-08

## 2024-01-08 RX ORDER — LOSARTAN POTASSIUM 25 MG/1
25 TABLET ORAL DAILY
Qty: 30 TABLET | Refills: 12 | Status: SHIPPED | OUTPATIENT
Start: 2024-01-08

## 2024-01-18 NOTE — ASSESSMENT & PLAN NOTE
Hypertension is worsening.  Dietary sodium restriction.  Weight loss.  Medication changes per orders.  Blood pressure will be reassessed in 4 weeks.  Blood pressure is not at goal. Increase losartan to 25mg from 12.5mg    Diagnosis, treatment and and course discussed. Potential side effects discussed. Return if there is worsening or persistence of symptoms.

## 2024-01-27 DIAGNOSIS — K59.04 CHRONIC IDIOPATHIC CONSTIPATION: ICD-10-CM

## 2024-01-27 RX ORDER — OMEPRAZOLE 40 MG/1
40 CAPSULE, DELAYED RELEASE ORAL DAILY
Qty: 30 CAPSULE | Refills: 6 | Status: CANCELLED | OUTPATIENT
Start: 2024-01-27

## 2024-01-27 RX ORDER — POLYETHYLENE GLYCOL 3350 17 G/17G
17 POWDER, FOR SOLUTION ORAL DAILY
Qty: 510 G | Refills: 1 | Status: CANCELLED | OUTPATIENT
Start: 2024-01-27

## 2024-01-29 DIAGNOSIS — K59.04 CHRONIC IDIOPATHIC CONSTIPATION: ICD-10-CM

## 2024-01-29 RX ORDER — OMEPRAZOLE 40 MG/1
40 CAPSULE, DELAYED RELEASE ORAL DAILY
Qty: 30 CAPSULE | Refills: 6 | Status: SHIPPED | OUTPATIENT
Start: 2024-01-29

## 2024-01-29 RX ORDER — POLYETHYLENE GLYCOL 3350 17 G/17G
17 POWDER, FOR SOLUTION ORAL DAILY
Qty: 510 G | Refills: 1 | Status: SHIPPED | OUTPATIENT
Start: 2024-01-29

## 2024-02-06 LAB
25(OH)D3+25(OH)D2 SERPL-MCNC: 53.3 NG/ML (ref 30–100)
ALBUMIN SERPL-MCNC: 4.8 G/DL (ref 3.9–4.9)
ALBUMIN/GLOB SERPL: 1.8 {RATIO} (ref 1.2–2.2)
ALP SERPL-CCNC: 93 IU/L (ref 44–121)
ALT SERPL-CCNC: 24 IU/L (ref 0–32)
AST SERPL-CCNC: 30 IU/L (ref 0–40)
BASOPHILS # BLD AUTO: 0 X10E3/UL (ref 0–0.2)
BASOPHILS NFR BLD AUTO: 1 %
BILIRUB SERPL-MCNC: 0.3 MG/DL (ref 0–1.2)
BUN SERPL-MCNC: 10 MG/DL (ref 8–27)
BUN/CREAT SERPL: 11 (ref 12–28)
CALCIUM SERPL-MCNC: 10 MG/DL (ref 8.7–10.3)
CHLORIDE SERPL-SCNC: 104 MMOL/L (ref 96–106)
CHOLEST SERPL-MCNC: 254 MG/DL (ref 100–199)
CHOLEST/HDLC SERPL: 3.3 RATIO (ref 0–4.4)
CO2 SERPL-SCNC: 23 MMOL/L (ref 20–29)
CREAT SERPL-MCNC: 0.91 MG/DL (ref 0.57–1)
EGFRCR SERPLBLD CKD-EPI 2021: 70 ML/MIN/1.73
EOSINOPHIL # BLD AUTO: 0.1 X10E3/UL (ref 0–0.4)
EOSINOPHIL NFR BLD AUTO: 2 %
ERYTHROCYTE [DISTWIDTH] IN BLOOD BY AUTOMATED COUNT: 13.1 % (ref 11.7–15.4)
GLOBULIN SER CALC-MCNC: 2.6 G/DL (ref 1.5–4.5)
GLUCOSE SERPL-MCNC: 97 MG/DL (ref 70–99)
HCT VFR BLD AUTO: 40.9 % (ref 34–46.6)
HDLC SERPL-MCNC: 78 MG/DL
HGB BLD-MCNC: 13.3 G/DL (ref 11.1–15.9)
IMM GRANULOCYTES # BLD AUTO: 0 X10E3/UL (ref 0–0.1)
IMM GRANULOCYTES NFR BLD AUTO: 0 %
LDLC SERPL CALC-MCNC: 150 MG/DL (ref 0–99)
LYMPHOCYTES # BLD AUTO: 2.6 X10E3/UL (ref 0.7–3.1)
LYMPHOCYTES NFR BLD AUTO: 34 %
MAGNESIUM SERPL-MCNC: 2.2 MG/DL (ref 1.6–2.3)
MCH RBC QN AUTO: 26.9 PG (ref 26.6–33)
MCHC RBC AUTO-ENTMCNC: 32.5 G/DL (ref 31.5–35.7)
MCV RBC AUTO: 83 FL (ref 79–97)
MONOCYTES # BLD AUTO: 0.6 X10E3/UL (ref 0.1–0.9)
MONOCYTES NFR BLD AUTO: 8 %
NEUTROPHILS # BLD AUTO: 4.1 X10E3/UL (ref 1.4–7)
NEUTROPHILS NFR BLD AUTO: 55 %
PLATELET # BLD AUTO: 353 X10E3/UL (ref 150–450)
POTASSIUM SERPL-SCNC: 4.8 MMOL/L (ref 3.5–5.2)
PROT SERPL-MCNC: 7.4 G/DL (ref 6–8.5)
RBC # BLD AUTO: 4.94 X10E6/UL (ref 3.77–5.28)
SODIUM SERPL-SCNC: 141 MMOL/L (ref 134–144)
TRIGL SERPL-MCNC: 147 MG/DL (ref 0–149)
TSH SERPL DL<=0.005 MIU/L-ACNC: 4.26 UIU/ML (ref 0.45–4.5)
VLDLC SERPL CALC-MCNC: 26 MG/DL (ref 5–40)
WBC # BLD AUTO: 7.5 X10E3/UL (ref 3.4–10.8)

## 2024-02-08 NOTE — PROGRESS NOTES
The ABCs of the Annual Wellness Visit  [place] to Medicare Visit    Chief Complaint   Patient presents with    Welcome To Medicare    Hypertension    Hyperlipidemia       Subjective   History of Present Illness:  Tirso Palomo is a 63 y.o. female who presents for a  Welcome to Medicare Visit.    HEALTH RISK ASSESSMENT    Recent Hospitalizations:  No hospitalization(s) within the last year.    Current Medical Providers:  Patient Care Team:  Miryam Martinez MD as PCP - General (Family Medicine)  Yue Jones MD as Consulting Physician (Bariatrics)    Smoking Status:  Social History     Tobacco Use   Smoking Status Former    Packs/day: 0.25    Years: 30.00    Additional pack years: 0.00    Total pack years: 7.50    Types: Cigarettes    Start date: 1990    Quit date: 2010    Years since quittin.5    Passive exposure: Past   Smokeless Tobacco Never   Tobacco Comments    Quit cold turkey       Alcohol Consumption:  Social History     Substance and Sexual Activity   Alcohol Use No       Depression Screen:       2024     3:22 PM   PHQ-2/PHQ-9 Depression Screening   Little Interest or Pleasure in Doing Things 0-->not at all   Feeling Down, Depressed or Hopeless 0-->not at all   PHQ-9: Brief Depression Severity Measure Score 0       Fall Risk Screen:  TEE Fall Risk Assessment was completed, and patient is at LOW risk for falls.Assessment completed on:2024    Health Habits and Functional and Cognitive Screenin/12/2024     3:21 PM   Functional & Cognitive Status   Do you have difficulty preparing food and eating? No   Do you have difficulty bathing yourself, getting dressed or grooming yourself? No   Do you have difficulty using the toilet? No   Do you have difficulty moving around from place to place? No   Do you have trouble with steps or getting out of a bed or a chair? No   Current Diet Well Balanced Diet   Dental Exam Up to date   Eye Exam Up to date   Exercise (times per week) 5  times per week   Current Exercises Include Walking;Bicycling Outdoors   Do you need help using the phone?  No   Are you deaf or do you have serious difficulty hearing?  No   Do you need help to go to places out of walking distance? No   Do you need help shopping? No   Do you need help preparing meals?  No   Do you need help with housework?  No   Do you need help with laundry? No   Do you need help taking your medications? No   Do you need help managing money? No   Do you ever drive or ride in a car without wearing a seat belt? No   Have you felt unusual stress, anger or loneliness in the last month? No   Who do you live with? Spouse   If you need help, do you have trouble finding someone available to you? No   Have you been bothered in the last four weeks by sexual problems? No   Do you have difficulty concentrating, remembering or making decisions? No         Does the patient have evidence of cognitive impairment? No    Asprin use counseling:Does not need ASA (and currently is not on it)    Visual Acuity:    Vision Screening    Right eye Left eye Both eyes   Without correction      With correction 20/15 20/15 20/15       Age-appropriate Screening Schedule:  Refer to the list below for future screening recommendations based on patient's age, sex and/or medical conditions. Orders for these recommended tests are listed in the plan section. The patient has been provided with a written plan.    Health Maintenance   Topic Date Due    TDAP/TD VACCINES (1 - Tdap) Never done    ZOSTER VACCINE (1 of 2) Never done    RSV Vaccine - Adults (1 - 1-dose 60+ series) Never done    HEPATITIS C SCREENING  Never done    DXA SCAN  03/19/2021    COVID-19 Vaccine (5 - 2023-24 season) 09/01/2023    Pneumococcal Vaccine 65+ (1 of 1 - PCV) Never done    INFLUENZA VACCINE  03/31/2024 (Originally 8/1/2023)    ANNUAL PHYSICAL  06/05/2024    BMI FOLLOWUP  09/29/2024    LIPID PANEL  02/05/2025    PAP SMEAR  07/22/2025    MAMMOGRAM  12/29/2025     COLORECTAL CANCER SCREENING  12/06/2030              The following portions of the patient's history were reviewed and updated as appropriate: allergies, current medications, past family history, past medical history, past social history, past surgical history, and problem list.    Outpatient Medications Prior to Visit   Medication Sig Dispense Refill    acetaminophen (TYLENOL) 500 MG tablet Take 2 tablets by mouth Every 6 (Six) Hours As Needed (pain). 60 tablet 0    cholecalciferol (VITAMIN D3) 25 MCG (1000 UT) tablet Take 1 tablet by mouth Daily.      clobetasol (TEMOVATE) 0.05 % external solution APPLY 4 DROPS ON THE SCALP AND GENTLY MASSAGE IN WITH CLEAN FINGERTIPS.      famotidine (PEPCID) 10 MG tablet Take 1 tablet by mouth 2 (Two) Times a Day.      levothyroxine (SYNTHROID, LEVOTHROID) 75 MCG tablet Take 1 tablet by mouth Daily. 90 tablet 1    losartan (COZAAR) 25 MG tablet Take 1 tablet by mouth Daily. 30 tablet 12    Minoxidil 5 % foam Apply 1 application topically As Needed.      Misc Natural Products (COLON CLEANSER PO) Take  by mouth. HOLD 5-7  days before surgery      multivitamin with minerals tablet tablet Take 1 tablet by mouth Daily. Bariatric      omeprazole (priLOSEC) 40 MG capsule Take 1 capsule by mouth Daily. 30 capsule 6    polyethylene glycol (MiraLax) 17 GM/SCOOP powder mix 1 capful (17 grams) in liquid and drink by mouth daily (Patient taking differently: 17 g Daily. Pt taking on occation) 510 g 1    vitamin E 100 UNIT capsule Take 1 capsule by mouth Daily. Nutrafol      Estrogens Conjugated (PREMARIN) 0.625 MG/GM vaginal cream Insert  into the vagina Daily. Use as directed 3 times a week for first week then start 2 times a week intravaginally. Only use for 2 weeks out of the month 42.5 g 12    Estrogens Conjugated (PREMARIN) 0.625 MG/GM vaginal cream Insert  into the vagina Daily.       No facility-administered medications prior to visit.       Patient Active Problem List   Diagnosis     Chronic fatigue    Essential hypertension    Snoring    Hemorrhoids    Multiple joint pain    Acquired hypothyroidism    Screening mammogram, encounter for    Screening for cervical cancer    Screening for colon cancer    Encounter for general adult medical examination with abnormal findings    Vitamin D deficiency    Urinary incontinence    Stress incontinence of urine    Spondylosis of lumbosacral spine without myelopathy    Class 1 obesity due to excess calories with serious comorbidity and body mass index (BMI) of 34.0 to 34.9 in adult    Menopause present    Hypermobility of urethra    History of colonic polyps    Female bladder prolapse    Abscess of right breast    Normal pelvic exam    Epidermal inclusion cyst    Status post sleeve gastrectomy    Chronic idiopathic constipation    History of tobacco use    Vaginal dryness    Alopecia    Change in bowel habit    Family history of colon cancer    H/O bariatric surgery    Hiatal hernia with GERD    Hiatal hernia    Influenza vaccine needed    Mixed hyperlipidemia    Numerous moles       Advanced Care Planning:  ACP discussion was held with the patient during this visit. Patient does not have an advance directive, information provided.    Discussion with Patientregarding advanced directives. POST form discussed at length and reviewed with patient. I spent over 16 minutes  with patient reviewing information and documenting  in the chart.   Reviewed medical interventions with patient and the differences between each: Comfort, Limited and Full.  Discussed artificially administered nutrition, patient is aware that if she is alert and oriented they can change their mind at any time. Advised to discuss with healthcare representative if they can comply with wishes. Patient encouraged to have a meeting to discuss his decision regarding advanced care directives and goals of care with extended family and significant  friends  In regard to the POST form:While options were  "reviewed with patient today, she is unable to make decisions at this time. She is going to think about it, review the forms provided and should she decide to complete the document let my office know. and advised to give to family members, place in an easily accessible place and take with them if going to the hospital or Emergency room.       Compared to one year ago, the patient feels her physical health is the same.  Compared to one year ago, the patient feels her mental health is the same.    Reviewed chart for potential of high risk medication in the elderly: no  Reviewed chart for potential of harmful drug interactions in the elderly:not applicable    Objective         Vitals:    02/12/24 1504   BP: 144/92   BP Location: Right arm   Patient Position: Sitting   Cuff Size: Large Adult   Pulse: 95   Resp: 20   Temp: 96.9 °F (36.1 °C)   TempSrc: Temporal   SpO2: 98%   Weight: 99.7 kg (219 lb 12.8 oz)   Height: 170.2 cm (67.01\")   PainSc: 0-No pain       Body mass index is 34.42 kg/m².  Discussed the patient's BMI with her. The BMI is above average; BMI management plan is completed.          Lab Results   Component Value Date    CHOL 189 04/23/2021     Lab Results   Component Value Date    TRIG 147 02/05/2024    TRIG 141 05/31/2023    TRIG 133 06/02/2022     Lab Results   Component Value Date    HDL 78 02/05/2024    HDL 54 05/31/2023    HDL 46 06/02/2022     Lab Results   Component Value Date     (H) 02/05/2024     (H) 05/31/2023     (H) 06/02/2022     Lab Results   Component Value Date    VLDL 26 02/05/2024    VLDL 25 05/31/2023    VLDL 24 06/02/2022     No results found for: \"LDLHDL\"            ECG 12 Lead    Date/Time: 2/12/2024 3:38 PM  Performed by: Miryam Martinez MD    Authorized by: Miryam Martinez MD  Comparison: compared with previous ECG from 12/5/2023  Similar to previous ECG  Rhythm: sinus rhythm  Rate: normal  ST Segments: ST segments normal  T Waves: T waves normal  QRS axis: " normal    Clinical impression: normal ECG          Assessment & Plan   Medicare Risks and Personalized Health Plan    Advance Directive Discussion    The above risks/problems have been discussed with the patient.  Pertinent information has been shared with the patient in the After Visit Summary.  Follow up plans and orders are seen below in the Assessment/Plan Section.    +++++E/M portion medically necessary secondary to new or uncontrolled chronic problem+++++++    Subjective   Grzegorzskip Palomo is here for:    Chief Complaint   Patient presents with    Welcome To Medicare    Hypertension    Hyperlipidemia       Hyperlipidemia  This is a chronic problem. The current episode started more than 1 year ago. Recent lipid tests were reviewed and are high. Exacerbating diseases include obesity. Pertinent negatives include no chest pain or shortness of breath.   Hypertension  This is a chronic problem. The current episode started more than 1 year ago. The problem has been worse since onset. The problem is uncontrolled. Pertinent negatives include no chest pain or shortness of breath. Risk factors for coronary artery disease include obesity and dyslipidemia. Current antihypertension treatment includes angiotensin blockers. The current treatment provides moderate improvement. Compliance problems include diet.          Physical Exam:  Review of Systems   Constitutional:  Negative for activity change and fever.   HENT:  Negative for ear pain, rhinorrhea, sinus pressure and voice change.    Eyes:  Negative for visual disturbance.   Respiratory:  Negative for cough and shortness of breath.    Cardiovascular:  Negative for chest pain.   Gastrointestinal:  Negative for abdominal pain, diarrhea, nausea and vomiting.   Endocrine: Negative for cold intolerance and heat intolerance.   Genitourinary:  Negative for frequency and urgency.   Musculoskeletal:  Negative for arthralgias.   Skin:  Negative for rash.   Neurological:  Negative  for syncope.   Hematological:  Does not bruise/bleed easily.   Psychiatric/Behavioral:  Negative for depressed mood. The patient is not nervous/anxious.         Physical Exam  Vitals and nursing note reviewed.   Constitutional:       General: She is not in acute distress.     Appearance: She is well-developed. She is not diaphoretic.   HENT:      Head: Normocephalic and atraumatic.      Right Ear: Tympanic membrane and external ear normal.      Left Ear: Tympanic membrane and external ear normal.      Nose: Nose normal.      Mouth/Throat:      Pharynx: No oropharyngeal exudate.   Eyes:      General: No scleral icterus.        Right eye: No discharge.         Left eye: No discharge.      Conjunctiva/sclera: Conjunctivae normal.      Pupils: Pupils are equal, round, and reactive to light.   Neck:      Thyroid: No thyromegaly.      Trachea: No tracheal deviation.   Cardiovascular:      Rate and Rhythm: Normal rate and regular rhythm.      Heart sounds: Normal heart sounds. No murmur heard.     No friction rub. No gallop.   Pulmonary:      Effort: Pulmonary effort is normal. No respiratory distress.      Breath sounds: Normal breath sounds. No stridor. No wheezing or rales.   Chest:   Breasts:     Right: No inverted nipple, mass, nipple discharge, skin change or tenderness.      Left: No inverted nipple, mass, nipple discharge, skin change or tenderness.   Abdominal:      General: Bowel sounds are normal. There is no distension.      Palpations: Abdomen is soft. There is no mass.      Tenderness: There is no abdominal tenderness. There is no guarding or rebound.   Genitourinary:     Labia:         Right: No rash, tenderness or lesion.         Left: No rash, tenderness or lesion.       Vagina: No vaginal discharge, erythema, bleeding or lesions.      Cervix: No discharge or lesion.      Adnexa:         Right: No mass or tenderness.          Left: No mass or tenderness.     Musculoskeletal:         General: No tenderness  or deformity. Normal range of motion.      Cervical back: Normal range of motion and neck supple.   Lymphadenopathy:      Cervical: No cervical adenopathy.   Skin:     General: Skin is warm and dry.      Capillary Refill: Capillary refill takes less than 2 seconds.      Coloration: Skin is not pale.      Findings: No erythema or rash.   Neurological:      General: No focal deficit present.      Mental Status: She is alert and oriented to person, place, and time.      Cranial Nerves: No cranial nerve deficit.      Sensory: No sensory deficit.      Motor: No tremor, atrophy or abnormal muscle tone.      Coordination: Coordination normal.      Gait: Gait normal.      Deep Tendon Reflexes: Reflexes are normal and symmetric. Reflexes normal.   Psychiatric:         Behavior: Behavior normal.         Thought Content: Thought content normal.         Cognition and Memory: Memory is not impaired. She does not exhibit impaired recent memory or impaired remote memory.         Judgment: Judgment normal.         Result Review :   The following data was reviewed by: Miryam Martinez MD on 02/12/2024:  CMP          12/5/2023    09:08 12/6/2023    20:06 2/5/2024    10:34   CMP   Glucose 97  129  97    BUN 8  9  10    Creatinine 0.86  0.85  0.91    EGFR 75.1  76.1     Sodium 143  137  141    Potassium 4.3  4.2  4.8    Chloride 107  103  104    Calcium 10.0  8.8  10.0    Total Protein   7.4    Total Protein  6.7     Albumin  4.0  4.8    Globulin   2.6    Globulin  2.7     Total Bilirubin  0.2  0.3    Alkaline Phosphatase  68  93    AST (SGOT)  33  30    ALT (SGPT)  25  24    Albumin/Globulin Ratio  1.5     BUN/Creatinine Ratio 9.3  10.6  11    Anion Gap 10.7  10.0       CBC w/diff          12/5/2023    09:08 12/6/2023    18:51 2/5/2024    10:34   CBC w/Diff   WBC 6.20  13.80  7.5    RBC 4.63  4.87  4.94    Hemoglobin 13.2  13.9  13.3    Hematocrit 39.2  43.6  40.9    MCV 84.7  89.6  83    MCH 28.5  28.6  26.9    MCHC 33.7  31.9  32.5     RDW 12.7  14.6  13.1    Platelets 318  314  353    Neutrophil Rel % 53.3  90.1  55    Immature Granulocyte Rel % 0.3      Lymphocyte Rel % 35.5  6.5  34    Monocyte Rel % 8.2  2.0  8    Eosinophil Rel % 2.1  0.7  2    Basophil Rel % 0.6  0.7  1      Lipid Panel          5/31/2023    09:11 2/5/2024    10:34   Lipid Panel   Total Cholesterol 193  254    Triglycerides 141  147    HDL Cholesterol 54  78    VLDL Cholesterol 25  26    LDL Cholesterol  114  150      TSH          2/27/2023    11:53 5/31/2023    09:11 2/5/2024    10:34   TSH   TSH 2.750  2.360  4.260           Assessment and Plan:  Problem List Items Addressed This Visit          Cardiac and Vasculature    Essential hypertension    Current Assessment & Plan     Hypertension is borderline  Continue current treatment regimen.  Dietary sodium restriction.  Weight loss.  Ambulatory blood pressure monitoring.  Blood pressure will be reassessedin 3 months.         Mixed hyperlipidemia    Current Assessment & Plan      Lipid abnormalities are worsening    Plan: Discussed diet changes    Counseled patient on lifestyle modifications to help control hyperlipidemia.     Patient Treatment Goals:   LDL goal is less than 70  LDL goal is under 100    Followup in 3 months.            Endocrine and Metabolic    Acquired hypothyroidism    Vitamin D deficiency    Class 1 obesity due to excess calories with serious comorbidity and body mass index (BMI) of 34.0 to 34.9 in adult       Skin    Numerous moles    Current Assessment & Plan     Referral to skin check         Relevant Orders    Ambulatory Referral to Dermatology (Completed)     Other Visit Diagnoses       Welcome to Medicare preventive visit    -  Primary    Relevant Orders    POCT urinalysis dipstick, manual (Completed)    ECG 12 Lead    Class 2 severe obesity due to excess calories with serious comorbidity and body mass index (BMI) of 35.0 to 35.9 in adult                         An After Visit Summary and PPPS  were given to the patient.    Discussed wearing sunscreen, seatbelts. Avoidance or caution with alcohol. Safe sex practices discussed. Discussed screening as appropriate for age.

## 2024-02-12 ENCOUNTER — OFFICE VISIT (OUTPATIENT)
Dept: FAMILY MEDICINE CLINIC | Facility: CLINIC | Age: 64
End: 2024-02-12
Payer: COMMERCIAL

## 2024-02-12 VITALS
SYSTOLIC BLOOD PRESSURE: 144 MMHG | BODY MASS INDEX: 34.5 KG/M2 | RESPIRATION RATE: 20 BRPM | HEIGHT: 67 IN | WEIGHT: 219.8 LBS | HEART RATE: 95 BPM | TEMPERATURE: 96.9 F | OXYGEN SATURATION: 98 % | DIASTOLIC BLOOD PRESSURE: 92 MMHG

## 2024-02-12 DIAGNOSIS — D22.9 NUMEROUS MOLES: ICD-10-CM

## 2024-02-12 DIAGNOSIS — E66.09 CLASS 1 OBESITY DUE TO EXCESS CALORIES WITH SERIOUS COMORBIDITY AND BODY MASS INDEX (BMI) OF 34.0 TO 34.9 IN ADULT: ICD-10-CM

## 2024-02-12 DIAGNOSIS — E03.9 ACQUIRED HYPOTHYROIDISM: ICD-10-CM

## 2024-02-12 DIAGNOSIS — Z00.00 WELCOME TO MEDICARE PREVENTIVE VISIT: Primary | ICD-10-CM

## 2024-02-12 DIAGNOSIS — I10 ESSENTIAL HYPERTENSION: ICD-10-CM

## 2024-02-12 DIAGNOSIS — E78.2 MIXED HYPERLIPIDEMIA: ICD-10-CM

## 2024-02-12 DIAGNOSIS — E55.9 VITAMIN D DEFICIENCY: ICD-10-CM

## 2024-02-12 DIAGNOSIS — E66.01 CLASS 2 SEVERE OBESITY DUE TO EXCESS CALORIES WITH SERIOUS COMORBIDITY AND BODY MASS INDEX (BMI) OF 35.0 TO 35.9 IN ADULT: ICD-10-CM

## 2024-02-12 PROBLEM — Z79.890 HORMONE REPLACEMENT THERAPY: Status: RESOLVED | Noted: 2022-06-02 | Resolved: 2024-02-12

## 2024-02-12 LAB
BILIRUB BLD-MCNC: NEGATIVE MG/DL
CLARITY, POC: CLEAR
COLOR UR: YELLOW
GLUCOSE UR STRIP-MCNC: NEGATIVE MG/DL
KETONES UR QL: NEGATIVE
LEUKOCYTE EST, POC: NEGATIVE
NITRITE UR-MCNC: NEGATIVE MG/ML
PH UR: 5 [PH] (ref 5–8)
PROT UR STRIP-MCNC: ABNORMAL MG/DL
RBC # UR STRIP: NEGATIVE /UL
SP GR UR: 1.02 (ref 1–1.03)
UROBILINOGEN UR QL: NORMAL

## 2024-02-20 PROBLEM — E78.2 MIXED HYPERLIPIDEMIA: Status: ACTIVE | Noted: 2024-01-08

## 2024-02-20 NOTE — ASSESSMENT & PLAN NOTE
Hypertension is borderline  Continue current treatment regimen.  Dietary sodium restriction.  Weight loss.  Ambulatory blood pressure monitoring.  Blood pressure will be reassessedin 3 months.

## 2024-02-20 NOTE — ASSESSMENT & PLAN NOTE
Lipid abnormalities are worsening    Plan: Discussed diet changes    Counseled patient on lifestyle modifications to help control hyperlipidemia.     Patient Treatment Goals:   LDL goal is less than 70  LDL goal is under 100    Followup in 3 months.

## 2024-03-06 DIAGNOSIS — E03.9 ACQUIRED HYPOTHYROIDISM: ICD-10-CM

## 2024-03-06 RX ORDER — LEVOTHYROXINE SODIUM 0.07 MG/1
75 TABLET ORAL DAILY
Qty: 90 TABLET | Refills: 1 | Status: SHIPPED | OUTPATIENT
Start: 2024-03-06

## 2024-03-12 ENCOUNTER — TELEPHONE (OUTPATIENT)
Dept: FAMILY MEDICINE CLINIC | Facility: CLINIC | Age: 64
End: 2024-03-12
Payer: COMMERCIAL

## 2024-03-12 NOTE — TELEPHONE ENCOUNTER
----- Message from Tirso Palomo sent at 3/12/2024  2:08 PM EDT -----  Regarding: One Year F/U appt with Oriana Mcmillan NP  Contact: [phone]  Dr Michelle henderson to bother you with this but can't  be helped. It is time for me to have my one year f/ u appt with NP Oriana Mcmillan  [facility]. It has been 1 yr since bariatric sx. And with my new [facility] Medicare Plan they have advised that I need  a referral to that office to see her.  Can you put this in for me or do I need appt with you to get referral for this f/u.    Thank you  Angela Palomo

## 2024-03-14 DIAGNOSIS — Z98.84 H/O BARIATRIC SURGERY: Primary | ICD-10-CM

## 2024-03-16 DIAGNOSIS — I10 ESSENTIAL HYPERTENSION: ICD-10-CM

## 2024-03-17 RX ORDER — LOSARTAN POTASSIUM 25 MG/1
25 TABLET ORAL DAILY
Qty: 30 TABLET | Refills: 12 | Status: SHIPPED | OUTPATIENT
Start: 2024-03-17

## 2024-04-03 ENCOUNTER — TELEPHONE (OUTPATIENT)
Dept: BARIATRICS/WEIGHT MGMT | Facility: CLINIC | Age: 64
End: 2024-04-03
Payer: COMMERCIAL

## 2024-04-03 NOTE — TELEPHONE ENCOUNTER
Spoke with pt, she was driving and could not schedule follow up appt at that time. Pt has referral that needs to be attached to next visit. Pt is going to call back///1YR PO DOS 3.16.23

## 2024-05-02 NOTE — PROGRESS NOTES
Subjective   Tirso Palomo is a 63 y.o. female. Presents to [facility]    Chief Complaint   Patient presents with    Arm Pain    Leg Pain       Arm Pain   The incident occurred more than 1 week ago. There was no injury mechanism. The pain is present in the left fingers, left forearm, left hand, left shoulder, left wrist and left elbow. The quality of the pain is described as burning. The pain radiates to the left arm. The pain is at a severity of 2/10. The pain is mild. Associated symptoms include numbness and tingling. The symptoms are aggravated by palpation. She has tried rest for the symptoms. The treatment provided no relief.   Leg Pain   The incident occurred more than 1 week ago. There was no injury mechanism. The pain is present in the left leg, left hip, left thigh and left knee. The quality of the pain is described as burning. The pain is at a severity of 2/10. The pain is mild. The pain has been Fluctuating since onset. Associated symptoms include numbness and tingling. Pertinent negatives include no inability to bear weight. The symptoms are aggravated by weight bearing and palpation. She has tried rest for the symptoms. The treatment provided no relief.      She did a 4 K last month and then she had pain walking on left side. It feels like a burning on the left hip to left knee. She is not having back pain she says.   Laying on her left arm makes it go numb. Left shoulder hurts some. Burning pain from shoulder to elbow with picking things up.     Massage helped the pain in arm and leg    I personally reviewed and updated the patient's allergies, medications, problem list, and past medical, surgical, social, and family history. I have reviewed and confirmed the accuracy of the History of Present Illness and Review of Symptoms as documented by the MA/LPN/RN. Miryam Martinez MD    Allergies:  Allergies   Allergen Reactions    Benadryl [Diphenhydramine] Other (See Comments)     Makes  her too sleepy    Lisinopril Cough    Topamax [Topiramate] GI Intolerance    Actigall [Ursodiol] Other (See Comments)     Burning in lining of stomach     Amlodipine Palpitations       Social History:  Social History     Socioeconomic History    Marital status:    Tobacco Use    Smoking status: Former     Current packs/day: 0.00     Average packs/day: 0.3 packs/day for 30.0 years (7.5 ttl pk-yrs)     Types: Cigarettes     Start date: 1990     Quit date: 2010     Years since quittin.8     Passive exposure: Past    Smokeless tobacco: Never    Tobacco comments:     Quit cold turkey   Vaping Use    Vaping status: Never Used   Substance and Sexual Activity    Alcohol use: No    Drug use: No    Sexual activity: Yes     Partners: Male     Birth control/protection: Post-menopausal       Family History:  Family History   Problem Relation Age of Onset    Breast cancer Mother     Cancer Mother         breast    Hypertension Father     Heart attack Father     Cancer Father         prostate    Obesity Brother     Hypertension Brother     Arthritis Brother     Heart disease Maternal Grandmother     Diabetes Maternal Grandmother     Cancer Paternal Grandmother     Breast cancer Maternal Aunt     Obesity Maternal Grandfather     Stroke Maternal Grandfather     Hypertension Maternal Grandfather     Stroke Paternal Grandfather     Heart attack Paternal Grandfather     Hypertension Paternal Grandfather        Past Medical History :  Patient Active Problem List   Diagnosis    Chronic fatigue    Essential hypertension    Snoring    Hemorrhoids    Multiple joint pain    Acquired hypothyroidism    Screening mammogram, encounter for    Screening for cervical cancer    Screening for colon cancer    Encounter for general adult medical examination with abnormal findings    Vitamin D deficiency    Urinary incontinence    Stress incontinence of urine    Spondylosis of lumbosacral spine without myelopathy    Class 1 obesity  due to excess calories with serious comorbidity and body mass index (BMI) of 34.0 to 34.9 in adult    Menopause present    Hypermobility of urethra    History of colonic polyps    Female bladder prolapse    Abscess of right breast    Normal pelvic exam    Epidermal inclusion cyst    Status post sleeve gastrectomy    Chronic idiopathic constipation    History of tobacco use    Vaginal dryness    Alopecia    Change in bowel habit    Family history of colon cancer    H/O bariatric surgery    Hiatal hernia with GERD    Hiatal hernia    Influenza vaccine needed    Mixed hyperlipidemia    Numerous moles    Cervical radiculopathy    Lumbar radiculopathy    Spasm of muscle of lower back       Medication List:    Current Outpatient Medications:     acetaminophen (TYLENOL) 500 MG tablet, Take 2 tablets by mouth Every 6 (Six) Hours As Needed (pain)., Disp: 60 tablet, Rfl: 0    cholecalciferol (VITAMIN D3) 25 MCG (1000 UT) tablet, Take 1 tablet by mouth Daily., Disp: , Rfl:     clobetasol (TEMOVATE) 0.05 % external solution, APPLY 4 DROPS ON THE SCALP AND GENTLY MASSAGE IN WITH CLEAN FINGERTIPS., Disp: , Rfl:     Estrogens Conjugated (PREMARIN) 0.625 MG/GM vaginal cream, Insert  into the vagina Daily., Disp: , Rfl:     famotidine (PEPCID) 10 MG tablet, Take 1 tablet by mouth 2 (Two) Times a Day., Disp: , Rfl:     levothyroxine (SYNTHROID, LEVOTHROID) 75 MCG tablet, TAKE 1 TABLET BY MOUTH EVERY DAY, Disp: 90 tablet, Rfl: 1    losartan (COZAAR) 25 MG tablet, Take 1 tablet by mouth Daily., Disp: 30 tablet, Rfl: 12    Minoxidil 5 % foam, Apply 1 application topically As Needed., Disp: , Rfl:     Misc Natural Products (COLON CLEANSER PO), Take  by mouth. HOLD 5-7  days before surgery, Disp: , Rfl:     multivitamin with minerals (HAIR SKIN AND NAILS FORMULA PO), Take 1 tablet by mouth Daily., Disp: , Rfl:     multivitamin with minerals tablet tablet, Take 1 tablet by mouth Daily. Bariatric, Disp: , Rfl:     omeprazole (priLOSEC) 40  "MG capsule, Take 1 capsule by mouth Daily., Disp: 30 capsule, Rfl: 6    polyethylene glycol (MiraLax) 17 GM/SCOOP powder, mix 1 capful (17 grams) in liquid and drink by mouth daily (Patient taking differently: 17 g Daily. Pt taking on occation), Disp: 510 g, Rfl: 1    Xiidra 5 % ophthalmic solution, instill 1 drop into both eyes twice a day, Disp: , Rfl:     baclofen (LIORESAL) 10 MG tablet, Take 1 tablet by mouth At Night As Needed for Muscle Spasms., Disp: 30 tablet, Rfl: 0    Past Surgical History:  Past Surgical History:   Procedure Laterality Date    BREAST SURGERY Right 2012    biopsy    COLONOSCOPY  2018    2018 2023    ENDOSCOPY N/A 02/09/2023    Procedure: ESOPHAGOGASTRODUODENOSCOPY with gastric biopsy;  Surgeon: Yue Jones MD;  Location: [facility] ENDOSCOPY;  Service: General;  Laterality: N/A;  normal    ENDOSCOPY N/A 11/30/2023    Procedure: ESOPHAGOGASTRODUODENOSCOPY with esophageal balloon dilation to 20mm;  Surgeon: Yue Jones MD;  Location: [facility] ENDOSCOPY;  Service: General;  Laterality: N/A;  stricture of gastric body    GASTRIC SLEEVE LAPAROSCOPIC N/A 03/16/2023    Procedure: GASTRIC SLEEVE LAPAROSCOPIC WITH DAVINCI ROBOT; HIATAL HERNIA REPAIR;  Surgeon: Yue Jones MD;  Location: [facility] MAIN OR;  Service: Robotics - [employer];  Laterality: N/A;    HIATAL HERNIA REPAIR N/A 12/6/2023    Procedure: LAPAROSCOPIC HIATAL HERNIA REPAIR; LIGAMENTUN TERES CARDIOPEXY WITH DAVINCI ROBOT;  Surgeon: Yue Jones MD;  Location: [facility] MAIN OR;  Service: Robotics - [employer];  Laterality: N/A;    TOENAIL EXCISION      01/2022    TUBAL ABDOMINAL LIGATION  1993         Physical Exam:      Vital Signs:    Vitals:    05/06/24 1207   BP: 134/84   Pulse: 92   Resp: 17   Temp: 97.1 °F (36.2 °C)   SpO2: 96%        /84 (BP Location: Right arm, Patient Position: Sitting, Cuff Size: Adult)   Pulse 92   Temp 97.1 °F (36.2 °C) (Temporal)   Resp 17   Ht 170.2 cm (67.01\")   Wt 102 kg (225 lb 3.2 oz)   LMP  (LMP " Unknown)   SpO2 96% Comment: room air  BMI 35.26 kg/m²     Wt Readings from Last 3 Encounters:   05/06/24 102 kg (225 lb 3.2 oz)   02/12/24 99.7 kg (219 lb 12.8 oz)   01/08/24 99.4 kg (219 lb 3.2 oz)       Result Review :                Physical Exam  Vitals reviewed.   Constitutional:       General: She is not in acute distress.     Appearance: Normal appearance. She is well-developed. She is not diaphoretic.   HENT:      Head: Normocephalic and atraumatic.   Eyes:      General:         Right eye: No discharge.         Left eye: No discharge.   Cardiovascular:      Rate and Rhythm: Normal rate and regular rhythm.      Heart sounds: Normal heart sounds. No murmur heard.     No friction rub. No gallop.   Pulmonary:      Effort: Pulmonary effort is normal. No respiratory distress.      Breath sounds: Normal breath sounds. No wheezing or rales.   Musculoskeletal:         General: No deformity.      Left shoulder: No swelling, bony tenderness or crepitus. Normal range of motion. Normal strength.      Lumbar back: No deformity, spasms or tenderness. Normal range of motion.   Skin:     General: Skin is warm and dry.      Findings: No rash.   Neurological:      Mental Status: She is alert and oriented to person, place, and time.      Motor: No abnormal muscle tone.      Coordination: Coordination normal.      Gait: Gait normal.      Deep Tendon Reflexes: Reflexes normal.   Psychiatric:         Behavior: Behavior is cooperative.         Assessment and Plan:  Problems Addressed this Visit          Endocrine and Metabolic    Class 1 obesity due to excess calories with serious comorbidity and body mass index (BMI) of 34.0 to 34.9 in adult       Musculoskeletal and Injuries    Spasm of muscle of lower back     Ice three times a day for about 10-15 minutes for the first 1-2 days. Then may alternate heat and ice. Better body mechanics discussed. Home exercises discussed and hand out given. Discussed nsaids and if they can be  taken. May need imaging and or PT if persists.  Discussed red flags, if there is severe pain, fever with pain, loss of movement in one or both legs pain, numbness in groin or both legs, trouble urinating or defecating on oneself, then patient is to go to the ER.            Relevant Medications    baclofen (LIORESAL) 10 MG tablet    Other Relevant Orders    Ambulatory Referral to Physical Therapy for Evaluation & Treatment       Neuro    Cervical radiculopathy     Ice three times a day for about 10-15 minutes for the first 1-2 days. Then may alternate heat and ice. Better body mechanics discussed. Home exercises discussed and hand out given. Discussed nsaids and if they can be taken. May need imaging and or PT if persists.  Discussed red flags, if there is severe pain, fever with pain, loss of movement in one or both legs pain, numbness in groin or both legs, trouble urinating or defecating on oneself, then patient is to go to the ER.            Relevant Medications    baclofen (LIORESAL) 10 MG tablet    Other Relevant Orders    XR Spine Cervical Complete 4 or 5 View (Completed)    Ambulatory Referral to Physical Therapy for Evaluation & Treatment    Lumbar radiculopathy - Primary    Relevant Medications    baclofen (LIORESAL) 10 MG tablet    Other Relevant Orders    XR Spine Lumbar 2 or 3 View (Completed)    Ambulatory Referral to Physical Therapy for Evaluation & Treatment       Tobacco    History of tobacco use     Diagnoses         Codes Comments    Lumbar radiculopathy    -  Primary ICD-10-CM: M54.16  ICD-9-CM: 724.4     Spasm of muscle of lower back     ICD-10-CM: M62.830  ICD-9-CM: 724.8     Cervical radiculopathy     ICD-10-CM: M54.12  ICD-9-CM: 723.4     History of tobacco use     ICD-10-CM: Z87.891  ICD-9-CM: V15.82     Class 1 obesity due to excess calories with serious comorbidity and body mass index (BMI) of 34.0 to 34.9 in adult     ICD-10-CM: E66.09, Z68.34  ICD-9-CM: 278.00, V85.34                           An After Visit Summary and PPPS were given to the patient.

## 2024-05-06 ENCOUNTER — OFFICE VISIT (OUTPATIENT)
Dept: FAMILY MEDICINE CLINIC | Facility: CLINIC | Age: 64
End: 2024-05-06
Payer: COMMERCIAL

## 2024-05-06 VITALS
OXYGEN SATURATION: 96 % | TEMPERATURE: 97.1 F | RESPIRATION RATE: 17 BRPM | SYSTOLIC BLOOD PRESSURE: 134 MMHG | WEIGHT: 225.2 LBS | HEIGHT: 67 IN | BODY MASS INDEX: 35.35 KG/M2 | DIASTOLIC BLOOD PRESSURE: 84 MMHG | HEART RATE: 92 BPM

## 2024-05-06 DIAGNOSIS — M54.12 CERVICAL RADICULOPATHY: ICD-10-CM

## 2024-05-06 DIAGNOSIS — M62.830 SPASM OF MUSCLE OF LOWER BACK: ICD-10-CM

## 2024-05-06 DIAGNOSIS — M54.16 LUMBAR RADICULOPATHY: Primary | ICD-10-CM

## 2024-05-06 DIAGNOSIS — E66.09 CLASS 1 OBESITY DUE TO EXCESS CALORIES WITH SERIOUS COMORBIDITY AND BODY MASS INDEX (BMI) OF 34.0 TO 34.9 IN ADULT: ICD-10-CM

## 2024-05-06 DIAGNOSIS — Z87.891 HISTORY OF TOBACCO USE: ICD-10-CM

## 2024-05-06 PROCEDURE — 3079F DIAST BP 80-89 MM HG: CPT | Performed by: FAMILY MEDICINE

## 2024-05-06 PROCEDURE — 1160F RVW MEDS BY RX/DR IN RCRD: CPT | Performed by: FAMILY MEDICINE

## 2024-05-06 PROCEDURE — 99214 OFFICE O/P EST MOD 30 MIN: CPT | Performed by: FAMILY MEDICINE

## 2024-05-06 PROCEDURE — 1125F AMNT PAIN NOTED PAIN PRSNT: CPT | Performed by: FAMILY MEDICINE

## 2024-05-06 PROCEDURE — 1159F MED LIST DOCD IN RCRD: CPT | Performed by: FAMILY MEDICINE

## 2024-05-06 PROCEDURE — 3075F SYST BP GE 130 - 139MM HG: CPT | Performed by: FAMILY MEDICINE

## 2024-05-06 RX ORDER — LIFITEGRAST 50 MG/ML
SOLUTION/ DROPS OPHTHALMIC
COMMUNITY
Start: 2024-03-20

## 2024-05-06 RX ORDER — BACLOFEN 10 MG/1
10 TABLET ORAL NIGHTLY PRN
Qty: 30 TABLET | Refills: 0 | Status: SHIPPED | OUTPATIENT
Start: 2024-05-06

## 2024-05-08 ENCOUNTER — HOSPITAL ENCOUNTER (OUTPATIENT)
Dept: GENERAL RADIOLOGY | Facility: HOSPITAL | Age: 64
Discharge: HOME | End: 2024-05-08
Payer: COMMERCIAL

## 2024-05-08 DIAGNOSIS — M54.12 CERVICAL RADICULOPATHY: ICD-10-CM

## 2024-05-08 DIAGNOSIS — M54.16 LUMBAR RADICULOPATHY: ICD-10-CM

## 2024-05-08 PROCEDURE — 72050 X-RAY EXAM NECK SPINE 4/5VWS: CPT

## 2024-05-08 PROCEDURE — 72100 X-RAY EXAM L-S SPINE 2/3 VWS: CPT

## 2024-05-10 DIAGNOSIS — Z78.0 ASYMPTOMATIC MENOPAUSE: Primary | ICD-10-CM

## 2024-05-13 ENCOUNTER — TELEPHONE (OUTPATIENT)
Dept: FAMILY MEDICINE CLINIC | Facility: CLINIC | Age: 64
End: 2024-05-13
Payer: COMMERCIAL

## 2024-05-13 DIAGNOSIS — E55.9 VITAMIN D DEFICIENCY: Primary | ICD-10-CM

## 2024-05-13 DIAGNOSIS — E78.2 MIXED HYPERLIPIDEMIA: ICD-10-CM

## 2024-05-13 DIAGNOSIS — E03.9 ACQUIRED HYPOTHYROIDISM: ICD-10-CM

## 2024-05-15 ENCOUNTER — TREATMENT (OUTPATIENT)
Dept: PHYSICAL THERAPY | Facility: CLINIC | Age: 64
End: 2024-05-15
Payer: COMMERCIAL

## 2024-05-15 DIAGNOSIS — R53.1 WEAKNESS GENERALIZED: ICD-10-CM

## 2024-05-15 DIAGNOSIS — M54.12 RADICULOPATHY, CERVICAL: Primary | ICD-10-CM

## 2024-05-15 DIAGNOSIS — M54.16 RADICULOPATHY, LUMBAR REGION: ICD-10-CM

## 2024-05-15 DIAGNOSIS — M25.552 PAIN OF LEFT HIP: ICD-10-CM

## 2024-05-15 DIAGNOSIS — M25.512 ACUTE PAIN OF LEFT SHOULDER: ICD-10-CM

## 2024-05-15 PROCEDURE — 97163 PT EVAL HIGH COMPLEX 45 MIN: CPT | Performed by: PHYSICAL THERAPIST

## 2024-05-15 PROCEDURE — 97012 MECHANICAL TRACTION THERAPY: CPT | Performed by: PHYSICAL THERAPIST

## 2024-05-15 NOTE — PROGRESS NOTES
Physical Therapy Initial Evaluation and Plan of Care  [address] [place] [address] [place] [address]    Patient: Tirso Palomo   : 1960  Diagnosis/ICD-10 Code:  Radiculopathy, cervical [M54.12]  Referring practitioner: Miryam Martinez MD  Date of Initial Visit: 5/15/2024  Today's Date: 5/15/2024  Patient seen for 1 sessions           Subjective Questionnaire: PT Functional Test: Quick DASH = 48% impairment      Subjective Evaluation    History of Present Illness  Mechanism of injury: Pt reports having L arm pain from shoulder to elbow that started ~8 wk ago. Reports she did have hernia repair in Dec and L shoulder felt a little different after that and then increased 8 wk ago with no reason or cause. States L hand goes numb every night. Has numbness in R hand at night also but not every night. She got a massage and states arm was not as bad. Reaching up increases numbness of L arm. Laying on L side makes it worse too. She states she works through this to get things down. Pt is L hand dominant. Denies any neck pain currently. Did go to chiropractor and states she is able to turn her neck better.    Has had L leg pain for ~1 yr along outside of thigh. This pain increased after doing a 4K just over 1 month ago. Cannot lay or sleep on L side. Describes lateral L thigh as deep ache. Has chronic LBP that has not worsened. Pt likes to walk for exercise daily but has not been able to do this because of pain. Has virtual 6K she is doing this month but does not have to do this walk. Has a 5K on  that she wants to do. Prolonged sitting increases low back pain. Prolonged walking increases achiness in L thigh. Ice and heat give temporary relief. Takes Aleve daily, muscle relaxer prn. Had lumbar injection several years ago and states the first one helped for about 6 mo but not help after that.   PMH: bariatric surgery 3/2023.      Patient Occupation: retired Quality of life: good    Pain  Current pain rating:  5  At best pain rating: 3  At worst pain rating: 10  Location: L upper arm, L lateral thigh  Quality: discomfort, dull ache and radiating  Relieving factors: change in position and medications  Aggravating factors: sleeping, standing, prolonged positioning, squatting and lifting  Progression: worsening    Hand dominance: left           Objective          Postural Observations    Additional Postural Observation Details  Elevated and rounded shoulders B, increased thoracic kyphosis, increased lumbar lordosis.    Palpation   Left   Muscle spasm in the upper trapezius.   Tenderness of the gluteus medius, infraspinatus, middle trapezius, piriformis, rhomboids, supraspinatus and TFL.     Tenderness     Left Shoulder   Tenderness in the medial scapula and supraspinatus tendon.     Left Hip   Tenderness in the greater trochanter.     Active Range of Motion   Cervical/Thoracic Spine   Normal active range of motion  Left Hip   Normal active range of motion    Right Hip   Normal active range of motion    Additional Active Range of Motion Details  B shoulder AROM WNL  Lumbar AROM WFL.    Strength/Myotome Testing   Cervical Spine     Right   Normal strength    Left Shoulder     Planes of Motion   Flexion: 4+   Abduction: 4+   External rotation at 0°: 4   Internal rotation at 0°: 4     Left Elbow   Flexion: 4+  Extension: 4+    Left Wrist/Hand   Wrist extension: 4+  Wrist flexion: 4+    Left Hip   Planes of Motion   Flexion: 3+  Extension: 3  Abduction: 3-    Right Hip   Planes of Motion   Flexion: 4-  Extension: 3  Abduction: 3+    Left Knee   Flexion: 4+  Extension: 4+    Right Knee   Flexion: 4+  Extension: 4+    Left Ankle/Foot   Dorsiflexion: 4+    Right Ankle/Foot   Dorsiflexion: 4+    Tests   Cervical     Left   Positive cervical distraction.     Left Shoulder   Positive empty can.   Negative full can.     Lumbar     Left   Positive passive SLR.     Right   Negative passive SLR.           Assessment & Plan        Assessment  Impairments: abnormal muscle firing, abnormal muscle tone, abnormal or restricted ROM, activity intolerance, impaired physical strength, lacks appropriate home exercise program and pain with function   Functional limitations: carrying objects, lifting, sleeping, walking, uncomfortable because of pain, moving in bed, standing, reaching behind back, reaching overhead and unable to perform repetitive tasks   Assessment details: Pt is 64 yo female with c/o L shoulder pain, L UE radicular pain, low back pain, and L lateral thigh pain. Pt presents with decreased strength of L shoulder. Pt with positive L empty can test with pain. L UE radicular symptoms improved with distraction. L shoulder symptoms consistent with rotator cuff tendonitis. L hip and thigh pain consistent greater trochanter bursitis and ITB pain. Lumbar pain improved with manual distraction. PT is having difficulty with transitional movements. Pain with laying on L side. Difficulty sleeping. Difficulty reaching overhead with L UE. Pt is L hand dominant. Quick DASH indicates 48% impairment.    Patient presents with the impairments listed above and based on the objective findings and the physical therapy evaluation, the patient's condition has the potential to improve in response to therapy.   The patient's condition and/or services required are at a level of complexity that necessitates the skill & supervision of a physical therapist.    Prognosis: good    Goals  Plan Goals: STG to be met in 3 wk:  - Pt to report 50% improvement in L UE radicular symptoms.  - Pt to report 50% decrease in L lateral thigh pain when walking.  - Pt to demonstrated improved posture with min verbal cues.  LTG to be met in 12 wk:  - Improve Quick DASH to 20% or less impairment.  - Increase L shoulder ext rotation and IR strength to 4+/5 for improved stability when reaching overhead.  - Increase B hip strength to 4/5 or greater in all directions for ease with all  transitional movements.  - Pt to report ability to walk >30 min at moderate intensity with max L thigh pain rating at 3/10 in order to walk 5K next month.    Plan  Therapy options: will be seen for skilled therapy services  Planned modality interventions: thermotherapy (hydrocollator packs), electrical stimulation/Russian stimulation, traction and dry needling  Other planned modality interventions: modalities as indicated  Planned therapy interventions: body mechanics training, flexibility, home exercise program, functional ROM exercises, manual therapy, postural training, soft tissue mobilization, strengthening, stretching, therapeutic activities, transfer training, abdominal trunk stabilization, neuromuscular re-education and gait training  Frequency: 1x week  Duration in weeks: 12  Treatment plan discussed with: patient        Timed:         Manual Therapy:         mins  15749;     Therapeutic Exercise:    5     mins  91733;     Neuromuscular Ti:        mins  07928;    Therapeutic Activity:          mins  87904;     Gait Training:           mins  99132;     Ultrasound:          mins  82752;    Ionto                                   mins   54040  Self - Care                          mins  65824    Un-Timed:  Electrical Stimulation:         mins  36547 ( );  Dry Needling          20561/20560  Traction     15     mins 68617  Can Repos          mins 51865  Low Eval          Mins  32580  Mod Eval          Mins  23989  High Eval                       40     Mins  34704      Timed Treatment:   5   mins   Total Treatment:     60   mins      PT SIGNATURE: Yamel Emmanuel PT, CLT  [place] Lic # [id number]  Electronically signed by Yamel Emmanuel PT, 05/15/24, 9:08 AM EDT    Initial Certification  Certification Period: 5/15/2024 thru 8/12/2024  I certify that the therapy services are furnished while this patient is under my care.  The services outlined above are required by this patient, and will be reviewed every  90 days.     PHYSICIAN: Miryam Martinez MD _____________________________________________________  NPI: [id number]                                      DATE:    Please sign and return via fax to [phone]. Thank you, [facility].

## 2024-05-23 ENCOUNTER — HOSPITAL ENCOUNTER (OUTPATIENT)
Dept: BONE DENSITY | Facility: HOSPITAL | Age: 64
Discharge: HOME | End: 2024-05-23
Admitting: FAMILY MEDICINE
Payer: COMMERCIAL

## 2024-05-23 ENCOUNTER — TREATMENT (OUTPATIENT)
Dept: PHYSICAL THERAPY | Facility: CLINIC | Age: 64
End: 2024-05-23
Payer: COMMERCIAL

## 2024-05-23 DIAGNOSIS — M54.12 RADICULOPATHY, CERVICAL: Primary | ICD-10-CM

## 2024-05-23 DIAGNOSIS — R53.1 WEAKNESS GENERALIZED: ICD-10-CM

## 2024-05-23 DIAGNOSIS — M25.512 ACUTE PAIN OF LEFT SHOULDER: ICD-10-CM

## 2024-05-23 DIAGNOSIS — Z78.0 ASYMPTOMATIC MENOPAUSE: ICD-10-CM

## 2024-05-23 DIAGNOSIS — M54.16 RADICULOPATHY, LUMBAR REGION: ICD-10-CM

## 2024-05-23 DIAGNOSIS — M25.552 PAIN OF LEFT HIP: ICD-10-CM

## 2024-05-23 PROCEDURE — 97140 MANUAL THERAPY 1/> REGIONS: CPT | Performed by: PHYSICAL THERAPIST

## 2024-05-23 PROCEDURE — 77080 DXA BONE DENSITY AXIAL: CPT

## 2024-05-23 PROCEDURE — 97012 MECHANICAL TRACTION THERAPY: CPT | Performed by: PHYSICAL THERAPIST

## 2024-05-23 PROCEDURE — 97110 THERAPEUTIC EXERCISES: CPT | Performed by: PHYSICAL THERAPIST

## 2024-05-23 NOTE — PROGRESS NOTES
Physical Therapy Daily Treatment Note      Patient: Tirso Palomo   : 1960  Diagnosis/ICD-10 Code:  Radiculopathy, cervical [M54.12]   Problems Addressed this Visit    None  Visit Diagnoses       Radiculopathy, cervical    -  Primary    Radiculopathy, lumbar region        Acute pain of left shoulder        Pain of left hip        Weakness generalized              Diagnoses         Codes Comments    Radiculopathy, cervical    -  Primary ICD-10-CM: M54.12  ICD-9-CM: 723.4     Radiculopathy, lumbar region     ICD-10-CM: M54.16  ICD-9-CM: 724.4     Acute pain of left shoulder     ICD-10-CM: M25.512  ICD-9-CM: 719.41     Pain of left hip     ICD-10-CM: M25.552  ICD-9-CM: 719.45     Weakness generalized     ICD-10-CM: R53.1  ICD-9-CM: 780.79           Referring practitioner: No ref. provider found  Date of Initial Visit: Type: THERAPY  Noted: 5/15/2024  Today's Date: 2024    VISIT#: 2    Subjective : pt reports she had a massage yesterday and that L shoulder is feeling better. States L leg was feeling a lot better until she stood for several hours at a viewing. Pain down L ITB then returned. Has been doing stretches.    Objective : Added clamshells.  Performed manual techniques, followed by ther ex and ended with traction.  See Exercise, Manual, and Modality Logs for complete treatment.     Assessment/Plan : Pt responding well to PT with decreased L ITB pain but pain then increased after standing at . Responding well to cervical traction with improving L UT pain.     Goals  Plan Goals: STG to be met in 3 wk:  - Pt to report 50% improvement in L UE radicular symptoms.  - Pt to report 50% decrease in L lateral thigh pain when walking.  - Pt to demonstrated improved posture with min verbal cues.  LTG to be met in 12 wk:  - Improve Quick DASH to 20% or less impairment.  - Increase L shoulder ext rotation and IR strength to 4+/5 for improved stability when reaching overhead.  - Increase B hip strength  to 4/5 or greater in all directions for ease with all transitional movements.  - Pt to report ability to walk >30 min at moderate intensity with max L thigh pain rating at 3/10 in order to walk 5K next month.    Progress per Plan of Care and Progress strengthening /stabilization /functional activity         Timed:         Manual Therapy:    10     mins  86540;     Therapeutic Exercise:    20     mins  52270;     Neuromuscular Ti:        mins  75561;    Therapeutic Activity:          mins  29024;     Gait Training:           mins  25429;     Ultrasound:          mins  04043;    Ionto                                   mins   35219  Self Care                            mins   92623    Un-Timed:  Electrical Stimulation:         mins  71935 ( );  Dry Needling          mins 87083/04572  Traction     15     mins 02416  Canalith Repos                   mins  67493  Low Eval          Mins  35809  Mod Eval          Mins  82930  High Eval                            Mins  79003  Re-Eval                               mins  86284    Timed Treatment:   30   mins   Total Treatment:     45   mins    Yamel Emmanuel, PT, CLT, Cert DN  Physical Therapist  [place] Lic # [id number]

## 2024-05-29 ENCOUNTER — OFFICE VISIT (OUTPATIENT)
Dept: BARIATRICS/WEIGHT MGMT | Facility: CLINIC | Age: 64
End: 2024-05-29
Payer: COMMERCIAL

## 2024-05-29 ENCOUNTER — TREATMENT (OUTPATIENT)
Dept: PHYSICAL THERAPY | Facility: CLINIC | Age: 64
End: 2024-05-29
Payer: COMMERCIAL

## 2024-05-29 VITALS
BODY MASS INDEX: 35.11 KG/M2 | HEIGHT: 67 IN | SYSTOLIC BLOOD PRESSURE: 154 MMHG | OXYGEN SATURATION: 99 % | HEART RATE: 64 BPM | DIASTOLIC BLOOD PRESSURE: 79 MMHG | WEIGHT: 223.7 LBS

## 2024-05-29 DIAGNOSIS — R53.1 WEAKNESS GENERALIZED: ICD-10-CM

## 2024-05-29 DIAGNOSIS — M54.16 RADICULOPATHY, LUMBAR REGION: ICD-10-CM

## 2024-05-29 DIAGNOSIS — E66.9 OBESITY, CLASS II, BMI 35-39.9: Primary | ICD-10-CM

## 2024-05-29 DIAGNOSIS — D50.9 IRON DEFICIENCY ANEMIA, UNSPECIFIED IRON DEFICIENCY ANEMIA TYPE: ICD-10-CM

## 2024-05-29 DIAGNOSIS — M54.12 RADICULOPATHY, CERVICAL: Primary | ICD-10-CM

## 2024-05-29 DIAGNOSIS — R13.10 DYSPHAGIA, UNSPECIFIED TYPE: ICD-10-CM

## 2024-05-29 DIAGNOSIS — Z90.3 H/O GASTRIC SLEEVE: ICD-10-CM

## 2024-05-29 DIAGNOSIS — M25.512 ACUTE PAIN OF LEFT SHOULDER: ICD-10-CM

## 2024-05-29 DIAGNOSIS — M25.552 PAIN OF LEFT HIP: ICD-10-CM

## 2024-05-29 PROCEDURE — 3077F SYST BP >= 140 MM HG: CPT | Performed by: NURSE PRACTITIONER

## 2024-05-29 PROCEDURE — 97110 THERAPEUTIC EXERCISES: CPT | Performed by: PHYSICAL THERAPIST

## 2024-05-29 PROCEDURE — 1159F MED LIST DOCD IN RCRD: CPT | Performed by: NURSE PRACTITIONER

## 2024-05-29 PROCEDURE — 97012 MECHANICAL TRACTION THERAPY: CPT | Performed by: PHYSICAL THERAPIST

## 2024-05-29 PROCEDURE — 97140 MANUAL THERAPY 1/> REGIONS: CPT | Performed by: PHYSICAL THERAPIST

## 2024-05-29 PROCEDURE — 1160F RVW MEDS BY RX/DR IN RCRD: CPT | Performed by: NURSE PRACTITIONER

## 2024-05-29 PROCEDURE — 97530 THERAPEUTIC ACTIVITIES: CPT | Performed by: PHYSICAL THERAPIST

## 2024-05-29 PROCEDURE — 3078F DIAST BP <80 MM HG: CPT | Performed by: NURSE PRACTITIONER

## 2024-05-29 PROCEDURE — 99214 OFFICE O/P EST MOD 30 MIN: CPT | Performed by: NURSE PRACTITIONER

## 2024-05-29 NOTE — PROGRESS NOTES
Physical Therapy Daily Treatment Note      Patient: Tirso Palomo   : 1960  Diagnosis/ICD-10 Code:  Radiculopathy, cervical [M54.12]   Problems Addressed this Visit    None  Visit Diagnoses       Radiculopathy, cervical    -  Primary    Radiculopathy, lumbar region        Acute pain of left shoulder        Pain of left hip        Weakness generalized              Diagnoses         Codes Comments    Radiculopathy, cervical    -  Primary ICD-10-CM: M54.12  ICD-9-CM: 723.4     Radiculopathy, lumbar region     ICD-10-CM: M54.16  ICD-9-CM: 724.4     Acute pain of left shoulder     ICD-10-CM: M25.512  ICD-9-CM: 719.41     Pain of left hip     ICD-10-CM: M25.552  ICD-9-CM: 719.45     Weakness generalized     ICD-10-CM: R53.1  ICD-9-CM: 780.79           Referring practitioner: Miryam Martinez MD  Date of Initial Visit: Type: THERAPY  Noted: 5/15/2024  Today's Date: 2024    VISIT#: 3    Subjective : Pt states her neck and L shoulder are feeling better but still having numbness off and on in L arm and had. States L hip and side of leg has been hurting a lot. Reports she was on her feet a lot over the weekend and pain increased after that.    Objective : Tenderness L greater trochanter and ITB.    Performed US to L greater trochanter this date.    See Exercise, Manual, and Modality Logs for complete treatment.     Assessment/Plan : Increased L ITB pain this date. Improved significantly post-tx with use of modalities, manual techniques, and stretching. Pt continues to have L UE radicular symptoms but this is improving with traction. Assess response to US next visit.    Goals  Plan Goals: STG to be met in 3 wk:  - Pt to report 50% improvement in L UE radicular symptoms.  - Pt to report 50% decrease in L lateral thigh pain when walking.  - Pt to demonstrated improved posture with min verbal cues.  LTG to be met in 12 wk:  - Improve Quick DASH to 20% or less impairment.  - Increase L shoulder ext rotation and IR  strength to 4+/5 for improved stability when reaching overhead.  - Increase B hip strength to 4/5 or greater in all directions for ease with all transitional movements.  - Pt to report ability to walk >30 min at moderate intensity with max L thigh pain rating at 3/10 in order to walk 5K next month.    Progress per Plan of Care and Progress strengthening /stabilization /functional activity         Timed:         Manual Therapy:    15     mins  26301;     Therapeutic Exercise:    15     mins  16573;     Neuromuscular Ti:        mins  88201;    Therapeutic Activity:     8     mins  27678;     Gait Training:           mins  98017;     Ultrasound:          mins  31958;    Ionto                                   mins   40474  Self Care                            mins   04583    Un-Timed:  Electrical Stimulation:         mins  43231 ( );  Dry Needling          mins 67109/58078  Traction     15     mins 10702  Canalith Repos                   mins  63878  Low Eval          Mins  96664  Mod Eval          Mins  77514  High Eval                            Mins  50275  Re-Eval                               mins  96007    Timed Treatment:   38   mins   Total Treatment:     53   mins    Yamel Emmanuel, PT, CLT, Cert DN  Physical Therapist  [place] Lic # [id number]

## 2024-05-29 NOTE — PROGRESS NOTES
[facility] BARIATRIC SURGERY  [address] [place] [address]  [address] [place] [address]  Dept: [phone]  5/29/2024      Tirso Palomo.  [id number]  [id number]  1960  female    Date of last surgery: 12/6/2023Laparoscopic Hiatal Hernia Repair; Ligamentun Teres Cardiopexy With Davinci Robot      Chief Complaint: BH Post-Op Bariatric Surgery:   Tirso Palomo is status post procedure listed above  HPI:     Wt Readings from Last 10 Encounters:   05/29/24 101 kg (223 lb 11.2 oz)   05/06/24 102 kg (225 lb 3.2 oz)   02/12/24 99.7 kg (219 lb 12.8 oz)   01/08/24 99.4 kg (219 lb 3.2 oz)   12/15/23 97.2 kg (214 lb 3.2 oz)   12/11/23 97.7 kg (215 lb 6.4 oz)   12/06/23 98.9 kg (218 lb)   12/04/23 99.7 kg (219 lb 12.8 oz)   11/30/23 100 kg (221 lb 1.9 oz)   11/27/23 99.3 kg (219 lb)        Today's weight is 101 kg (223 lb 11.2 oz) pounds,BMI 35.03 has a gain 8 pounds since the last visit and weight loss since surgery is 22 pounds. The patient reports a decreased portion size and loss of appetite.      Tirso Palomo reportsnausea and vomiting with different textures of foods, denser foods, got some better after hiatal hernia repair but then recently has gotten worse again, also having some dysphagia with denser foods      Diet and Exercise: Diet history reviewed and discussed with the patient. Weight loss/gains to date discussed with the patient.     She reports eating 3 meals per day, a typical portion size of 1/2-1 cup, eating 1 snacks per day, drinking 6 or more 8-oz. glasses of water per day, no carbonated beverage consumption and exercising regularly.       The patient states they are eating 40 grams of protein per day.       Still having trouble with different textures- turkey, pork - happening 2 times a week     Breakfast: oats, half wrap or cereal   Lunch: ramen noodles, crackers , soup  Dinner: ramen noodles, wraps, fruits    Snacks: crackers, fruit   Coffee, cherry juice, butter milk, Gatorade   Exercise: did a 4 k but then injured herself and cannot exercise since, doing PT now     Protein shake prn     Bariatric vitamin     Review of Systems   Constitutional:  Positive for activity change and appetite change.   Respiratory: Negative.     Cardiovascular: Negative.    Gastrointestinal:         Dysphagia with certain textures    Musculoskeletal:         Injured shoulder/ arm          Patient Active Problem List   Diagnosis    Chronic fatigue    Essential hypertension    Snoring    Hemorrhoids    Multiple joint pain    Acquired hypothyroidism    Screening mammogram, encounter for    Screening for cervical cancer    Screening for colon cancer    Encounter for general adult medical examination with abnormal findings    Vitamin D deficiency    Urinary incontinence    Stress incontinence of urine    Spondylosis of lumbosacral spine without myelopathy    Class 1 obesity due to excess calories with serious comorbidity and body mass index (BMI) of 34.0 to 34.9 in adult    Menopause present    Hypermobility of urethra    History of colonic polyps    Female bladder prolapse    Abscess of right breast    Normal pelvic exam    Epidermal inclusion cyst    Status post sleeve gastrectomy    Chronic idiopathic constipation    History of tobacco use    Vaginal dryness    Alopecia    Change in bowel habit    Family history of colon cancer    H/O bariatric surgery    Hiatal hernia with GERD    Hiatal hernia    Influenza vaccine needed    Mixed hyperlipidemia    Numerous moles    Cervical radiculopathy    Lumbar radiculopathy    Spasm of muscle of lower back       Past Medical History:   Diagnosis Date    Allergic     Arthritis     Elevated cholesterol     Hiatal hernia     Hiatal hernia with GERD 12/04/2023    Hormone replacement therapy     Hypertension     Hypothyroidism     Insomnia     Sciatica      Past Surgical History:   Procedure Laterality Date     TUBAL ABDOMINAL LIGATION  1993    BREAST SURGERY Right 2012    biopsy    COLONOSCOPY  2018 2018 2023    ENDOSCOPY N/A 02/09/2023    Procedure: ESOPHAGOGASTRODUODENOSCOPY with gastric biopsy;  Surgeon: Yue Jones MD;  Location: [facility] ENDOSCOPY;  Service: General;  Laterality: N/A;  normal    GASTRIC SLEEVE LAPAROSCOPIC N/A 03/16/2023    Procedure: GASTRIC SLEEVE LAPAROSCOPIC WITH DAVINCI ROBOT; HIATAL HERNIA REPAIR;  Surgeon: Yue Jones MD;  Location: [facility] MAIN OR;  Service: Robotics - DaVinci;  Laterality: N/A;    ENDOSCOPY N/A 11/30/2023    Procedure: ESOPHAGOGASTRODUODENOSCOPY with esophageal balloon dilation to 20mm;  Surgeon: Yue Jones MD;  Location: [facility] ENDOSCOPY;  Service: General;  Laterality: N/A;  stricture of gastric body    HIATAL HERNIA REPAIR N/A 12/6/2023    Procedure: LAPAROSCOPIC HIATAL HERNIA REPAIR; LIGAMENTUN TERES CARDIOPEXY WITH DAVINCI ROBOT;  Surgeon: Yue Jones MD;  Location: [facility] MAIN OR;  Service: Robotics - DaVinci;  Laterality: N/A;    TOENAIL EXCISION      01/2022      The following portions of the patient's history were reviewed and updated as appropriate: allergies, current medications, past medical history, past social history, past surgical history, and problem list.    Height 67.01 inches, weight 223 pounds, BMI 35.03    Physical Exam  Constitutional:       Appearance: Normal appearance. She is obese.   Pulmonary:      Effort: Pulmonary effort is normal.   Abdominal:      General: Abdomen is flat.      Palpations: Abdomen is soft.   Skin:     General: Skin is warm and dry.   Neurological:      General: No focal deficit present.      Mental Status: She is alert and oriented to person, place, and time.   Psychiatric:         Mood and Affect: Mood normal.         Behavior: Behavior normal.         Thought Content: Thought content normal.         Judgment: Judgment normal.             Assessment:   BMI 35.03, class 2 obesity, 1 yr gastric sleeve, dysphagia      Post-op, the patient is struggling with continued weight loss. She has gained weight since her last visit.  Pt has had a lot of stress recently with her mother being diagnosed with cancer, two uncles passing away and hurting herself after doing a 4 k walk/ race. She is now doing PT for her injuries but is still unable to start exercising again. She has also been struggling getting her protein in as she has been sticking with softer foods as she is still having dysphagia. She had a hiatal hernia repair a few months ago and states dysphagia got better but then got worse recently. I offered to send pt for an upper GI but she states she is unable to do this right now due to problems with her insurance. Also encourage chewing food well and eating slowly. Pt is also reverting back to old habits and eating things like penny noodles and drinking buttermilk. I encouraged pt to back off these items as they contain a lot of fat and calories. Also encourage pt to start tracking dietary intake via rena.     Encourage 1 protein shake, supplement a day. Also encourage continuation with PT and once cleared to start exercising 20-30 minutes 2-3 days a week including strength training. Plan to follow up in 6 months to reassess dysphagia.     Also will check labs as pt recently went to give blood and was denied due to low hgb. She is taking a bariatric multi daily.     Plan:     Encouraged patient to be sure to get plenty of lean protein per day through small frequent meals all with a protein source.   Activity restrictions: none.   Recommended patient be sure to get at least 70 grams of protein per day by eating small, frequent meals all with high lean protein choices. Be sure to limit/cut back on daily carbohydrate intake. Discussed with the patient the recommended amount of water per day to intake- half of body weight in ounces. Reviewed vitamin requirements. Be sure to do routine exercise, 150 minutes per week minimum,  including both cardio and strength training.     Instructions / Recommendations: dietary counseling recommended, recommended a daily protein intake of  grams, vitamin supplement(s) recommended, recommended exercising at least 150 minutes per week, behavior modifications recommended and instructed to call the office for concerns, questions, or problems.     The patient was instructed to follow up in 6 months.     The patient was counseled regarding diet and exercise/ dysphagia. Total time spent face to face was 30 minutes and 15 minutes was spent counseling.     DARINEL Pompa  [facility]ariat[facility]

## 2024-06-05 ENCOUNTER — TREATMENT (OUTPATIENT)
Dept: PHYSICAL THERAPY | Facility: CLINIC | Age: 64
End: 2024-06-05
Payer: COMMERCIAL

## 2024-06-05 DIAGNOSIS — M54.12 RADICULOPATHY, CERVICAL: Primary | ICD-10-CM

## 2024-06-05 DIAGNOSIS — M25.512 ACUTE PAIN OF LEFT SHOULDER: ICD-10-CM

## 2024-06-05 DIAGNOSIS — M54.16 RADICULOPATHY, LUMBAR REGION: ICD-10-CM

## 2024-06-05 DIAGNOSIS — M25.552 PAIN OF LEFT HIP: ICD-10-CM

## 2024-06-05 DIAGNOSIS — R53.1 WEAKNESS GENERALIZED: ICD-10-CM

## 2024-06-05 NOTE — PROGRESS NOTES
Physical Therapy Daily Treatment Note      Patient: Tirso Palomo   : 1960  Diagnosis/ICD-10 Code:  Radiculopathy, cervical [M54.12]   Problems Addressed this Visit    None  Visit Diagnoses       Radiculopathy, cervical    -  Primary    Radiculopathy, lumbar region        Acute pain of left shoulder        Pain of left hip        Weakness generalized              Diagnoses         Codes Comments    Radiculopathy, cervical    -  Primary ICD-10-CM: M54.12  ICD-9-CM: 723.4     Radiculopathy, lumbar region     ICD-10-CM: M54.16  ICD-9-CM: 724.4     Acute pain of left shoulder     ICD-10-CM: M25.512  ICD-9-CM: 719.41     Pain of left hip     ICD-10-CM: M25.552  ICD-9-CM: 719.45     Weakness generalized     ICD-10-CM: R53.1  ICD-9-CM: 780.79           Referring practitioner: Miryam Martinez MD  Date of Initial Visit: Type: THERAPY  Noted: 5/15/2024  Today's Date: 2024    VISIT#: 4    Subjective : Pt reports she is feeling better. States L ITB has been feeling better since last visit and thinks percussion gun helped. Had a good weekend in [place] for a concert. States her leg did not start hurting until she got back home.     Objective : added NuStep for cardio. Added sidelying hip abd and resistance to clamshells.  Increased force on cervical traction to 16#.    See Exercise, Manual, and Modality Logs for complete treatment.     Assessment/Plan : Decreased pain at start of session of L LE. Responded well to ultrasound to L greater trochanter and use of percussion gun to L ITB at last visit. Good tolerance to ther ex progression. Responded well to increased force on traction this date. Cues for scapular awareness during mid rows. Pt will benefit from continued scapular and mid back strengthening along with progression of core and B LE strengthening to decrease pain. Pt's goal is to participate in 5K walks again.     Goals  Plan Goals: STG to be met in 3 wk:  - Pt to report 50% improvement in L UE radicular  symptoms. - MET  - Pt to report 50% decrease in L lateral thigh pain when walking. - Progressing  - Pt to demonstrated improved posture with min verbal cues. - Progressing  LTG to be met in 12 wk:  - Improve Quick DASH to 20% or less impairment.  - Increase L shoulder ext rotation and IR strength to 4+/5 for improved stability when reaching overhead.  - Increase B hip strength to 4/5 or greater in all directions for ease with all transitional movements.  - Pt to report ability to walk >30 min at moderate intensity with max L thigh pain rating at 3/10 in order to walk 5K next month.    Progress per Plan of Care and Progress strengthening /stabilization /functional activity         Timed:         Manual Therapy:    10     mins  60308;     Therapeutic Exercise:    15     mins  38592;     Neuromuscular Ti:        mins  54167;    Therapeutic Activity:     8     mins  70995;     Gait Training:           mins  41590;     Ultrasound:      8    mins  96500;    Ionto                                   mins   02705  Self Care                            mins   67142    Un-Timed:  Electrical Stimulation:         mins  53108 ( );  Dry Needling          mins 83496/61772  Traction      15    mins 18594  Canalith Repos                   mins  11422  Low Eval          Mins  15552  Mod Eval          Mins  62446  High Eval                            Mins  06741  Re-Eval                               mins  25270    Timed Treatment:   41   mins   Total Treatment:     56   mins    Yamel Emmanuel, PT, CLT, Cert DN  Physical Therapist  [place] Lic # [id number]

## 2024-06-13 ENCOUNTER — TREATMENT (OUTPATIENT)
Dept: PHYSICAL THERAPY | Facility: CLINIC | Age: 64
End: 2024-06-13
Payer: COMMERCIAL

## 2024-06-13 DIAGNOSIS — M54.12 RADICULOPATHY, CERVICAL: Primary | ICD-10-CM

## 2024-06-13 DIAGNOSIS — M25.552 PAIN OF LEFT HIP: ICD-10-CM

## 2024-06-13 DIAGNOSIS — M54.16 RADICULOPATHY, LUMBAR REGION: ICD-10-CM

## 2024-06-13 DIAGNOSIS — R53.1 WEAKNESS GENERALIZED: ICD-10-CM

## 2024-06-13 DIAGNOSIS — M25.512 ACUTE PAIN OF LEFT SHOULDER: ICD-10-CM

## 2024-06-13 NOTE — PROGRESS NOTES
Physical Therapy Daily Treatment Note      Patient: Tirso Palomo   : 1960  Diagnosis/ICD-10 Code:  Radiculopathy, cervical [M54.12]   Problems Addressed this Visit    None  Visit Diagnoses       Radiculopathy, cervical    -  Primary    Radiculopathy, lumbar region        Acute pain of left shoulder        Pain of left hip        Weakness generalized              Diagnoses         Codes Comments    Radiculopathy, cervical    -  Primary ICD-10-CM: M54.12  ICD-9-CM: 723.4     Radiculopathy, lumbar region     ICD-10-CM: M54.16  ICD-9-CM: 724.4     Acute pain of left shoulder     ICD-10-CM: M25.512  ICD-9-CM: 719.41     Pain of left hip     ICD-10-CM: M25.552  ICD-9-CM: 719.45     Weakness generalized     ICD-10-CM: R53.1  ICD-9-CM: 780.79           Referring practitioner: Miryam Martinez MD  Date of Initial Visit: Type: THERAPY  Noted: 5/15/2024  Today's Date: 2024    VISIT#: 5    Subjective : Angela reports her L leg is feeling better. Has been walking faster and building her speed back up. States she can feel a stretching at the side of her L thigh, no pain. Going up and down stairs one at a time now. Had pain in L upper arm yesterday (indicates bicep region). States she had been sitting all day visiting her [facility] at [facility]. No neck pain.     Objective :     See Exercise, Manual, and Modality Logs for complete treatment.     Assessment/Plan : Continues to have decreased pain of L LE. Able to increase roberto when walking. Responds well to ultrasound to L greater trochanter and use of percussion gun to L ITB. Good tolerance to ther ex progression. Cues for scapular awareness during mid rows. Pt will benefit from continued scapular and mid back strengthening along with progression of core and B LE strengthening to decrease pain.      Goals  Plan Goals: STG to be met in 3 wk:  - Pt to report 50% improvement in L UE radicular symptoms. - MET  - Pt to report 50% decrease in L lateral thigh pain when walking.  - Progressing  - Pt to demonstrated improved posture with min verbal cues. - Progressing  LTG to be met in 12 wk:  - Improve Quick DASH to 20% or less impairment.  - Increase L shoulder ext rotation and IR strength to 4+/5 for improved stability when reaching overhead.  - Increase B hip strength to 4/5 or greater in all directions for ease with all transitional movements.  - Pt to report ability to walk >30 min at moderate intensity with max L thigh pain rating at 3/10 in order to walk 5K next month.    Progress per Plan of Care and Progress strengthening /stabilization /functional activity      Timed:         Manual Therapy:    10     mins  21649;     Therapeutic Exercise:    15     mins  51549;     Neuromuscular Ti:        mins  00710;    Therapeutic Activity:     8     mins  25298;     Gait Training:           mins  71820;     Ultrasound:     8     mins  68355;    Ionto                                   mins   32792  Self Care                            mins   70700    Un-Timed:  Electrical Stimulation:         mins  85979 ( );  Dry Needling          mins 59801/66618  Traction     15     mins 59549  Canalith Repos                   mins  78899  Low Eval          Mins  43857  Mod Eval          Mins  27204  High Eval                            Mins  46699  Re-Eval                               mins  11476    Timed Treatment:   41   mins   Total Treatment:     56   mins    Yamel Emmanuel, PT, CLT, Cert DN  Physical Therapist  [place] Lic # [id number]

## 2024-06-14 LAB
25(OH)D3+25(OH)D2 SERPL-MCNC: 37.5 NG/ML (ref 30–100)
ALBUMIN SERPL-MCNC: 4.6 G/DL (ref 3.9–4.9)
ALBUMIN/GLOB SERPL: 1.8 {RATIO}
ALP SERPL-CCNC: 88 IU/L (ref 44–121)
ALT SERPL-CCNC: 16 IU/L (ref 0–32)
AST SERPL-CCNC: 21 IU/L (ref 0–40)
BILIRUB SERPL-MCNC: 0.2 MG/DL (ref 0–1.2)
BUN SERPL-MCNC: 8 MG/DL (ref 8–27)
BUN/CREAT SERPL: 10 (ref 12–28)
CALCIUM SERPL-MCNC: 9.7 MG/DL (ref 8.7–10.3)
CHLORIDE SERPL-SCNC: 102 MMOL/L (ref 96–106)
CHOLEST SERPL-MCNC: 215 MG/DL (ref 100–199)
CHOLEST/HDLC SERPL: 3.5 RATIO (ref 0–4.4)
CO2 SERPL-SCNC: 24 MMOL/L (ref 20–29)
CREAT SERPL-MCNC: 0.82 MG/DL (ref 0.57–1)
EGFRCR SERPLBLD CKD-EPI 2021: 79 ML/MIN/1.73
GLOBULIN SER CALC-MCNC: 2.5 G/DL (ref 1.5–4.5)
GLUCOSE SERPL-MCNC: 88 MG/DL (ref 70–99)
HDLC SERPL-MCNC: 61 MG/DL
LDLC SERPL CALC-MCNC: 130 MG/DL (ref 0–99)
POTASSIUM SERPL-SCNC: 4.9 MMOL/L (ref 3.5–5.2)
PROT SERPL-MCNC: 7.1 G/DL (ref 6–8.5)
SODIUM SERPL-SCNC: 138 MMOL/L (ref 134–144)
TRIGL SERPL-MCNC: 136 MG/DL (ref 0–149)
TSH SERPL DL<=0.005 MIU/L-ACNC: 2.38 UIU/ML (ref 0.45–4.5)
VLDLC SERPL CALC-MCNC: 24 MG/DL (ref 5–40)

## 2024-06-17 NOTE — PROGRESS NOTES
Subjective   Tirso Palomo is a 63 y.o. female. Presents to [facility]    Chief Complaint   Patient presents with    Arm Pain    Leg Pain       Arm Pain   The incident occurred more than 1 week ago. There was no injury mechanism. The pain is present in the left forearm and left shoulder. The quality of the pain is described as burning. The pain radiates to the left arm. The pain is at a severity of 3/10. The pain is mild. Associated symptoms include numbness and tingling. Pertinent negatives include no muscle weakness. The symptoms are aggravated by palpation. She has tried rest (physical therapy) for the symptoms. The treatment provided no relief.   Leg Pain   The incident occurred more than 1 week ago. There was no injury mechanism. The pain is present in the left leg and left thigh. The quality of the pain is described as burning. The pain is at a severity of 3/10. The pain is mild. The pain has been Fluctuating since onset. Associated symptoms include numbness and tingling. Pertinent negatives include no inability to bear weight or muscle weakness. The symptoms are aggravated by weight bearing and palpation. She has tried rest (physical therapy) for the symptoms. The treatment provided moderate relief.        Her arm is doing better at PT. The exercises and traction are helping    Her left leg is still bothering her. The pain is in her left hip and radiates to her knee    I personally reviewed and updated the patient's allergies, medications, problem list, and past medical, surgical, social, and family history. I have reviewed and confirmed the accuracy of the History of Present Illness and Review of Symptoms as documented by the MA/LPN/RN. Miryam Martinez MD    Allergies:  Allergies   Allergen Reactions    Benadryl [Diphenhydramine] Other (See Comments)     Makes her too sleepy    Lisinopril Cough    Topamax [Topiramate] GI Intolerance    Actigall [Ursodiol] Other (See Comments)      Burning in lining of stomach     Amlodipine Palpitations       Social History:  Social History     Socioeconomic History    Marital status:    Tobacco Use    Smoking status: Former     Current packs/day: 0.00     Average packs/day: 0.3 packs/day for 30.0 years (7.5 ttl pk-yrs)     Types: Cigarettes     Start date: 1990     Quit date: 2010     Years since quittin.9     Passive exposure: Past    Smokeless tobacco: Never    Tobacco comments:     Quit cold turkey   Vaping Use    Vaping status: Never Used   Substance and Sexual Activity    Alcohol use: No    Drug use: No    Sexual activity: Yes     Partners: Male     Birth control/protection: Post-menopausal       Family History:  Family History   Problem Relation Age of Onset    Breast cancer Mother     Cancer Mother         breast    Hypertension Father     Heart attack Father     Cancer Father         prostate    Obesity Brother     Hypertension Brother     Arthritis Brother     Heart disease Maternal Grandmother     Diabetes Maternal Grandmother     Cancer Paternal Grandmother     Breast cancer Maternal Aunt     Obesity Maternal Grandfather     Stroke Maternal Grandfather     Hypertension Maternal Grandfather     Stroke Paternal Grandfather     Heart attack Paternal Grandfather     Hypertension Paternal Grandfather        Past Medical History :  Patient Active Problem List   Diagnosis    Chronic fatigue    Essential hypertension    Snoring    Hemorrhoids    Multiple joint pain    Acquired hypothyroidism    Screening mammogram, encounter for    Screening for cervical cancer    Screening for colon cancer    Encounter for general adult medical examination with abnormal findings    Vitamin D deficiency    Urinary incontinence    Stress incontinence of urine    Spondylosis of lumbosacral spine without myelopathy    Class 2 severe obesity due to excess calories with serious comorbidity and body mass index (BMI) of 35.0 to 35.9 in adult    Menopause  present    Hypermobility of urethra    History of colonic polyps    Female bladder prolapse    Abscess of right breast    Normal pelvic exam    Epidermal inclusion cyst    Status post sleeve gastrectomy    Chronic idiopathic constipation    History of tobacco use    Vaginal dryness    Alopecia    Change in bowel habit    Family history of colon cancer    H/O bariatric surgery    Hiatal hernia with GERD    Hiatal hernia    Influenza vaccine needed    Mixed hyperlipidemia    Numerous moles    Cervical radiculopathy    Lumbar radiculopathy    Spasm of muscle of lower back    Skin lesion of chest wall    Left hip pain    Iliotibial band syndrome of left side       Medication List:    Current Outpatient Medications:     acetaminophen (TYLENOL) 500 MG tablet, Take 2 tablets by mouth Every 6 (Six) Hours As Needed (pain)., Disp: 60 tablet, Rfl: 0    baclofen (LIORESAL) 10 MG tablet, Take 1 tablet by mouth At Night As Needed for Muscle Spasms., Disp: 30 tablet, Rfl: 0    cholecalciferol (VITAMIN D3) 25 MCG (1000 UT) tablet, Take 1 tablet by mouth Daily., Disp: , Rfl:     clobetasol (TEMOVATE) 0.05 % external solution, APPLY 4 DROPS ON THE SCALP AND GENTLY MASSAGE IN WITH CLEAN FINGERTIPS., Disp: , Rfl:     Estrogens Conjugated (PREMARIN) 0.625 MG/GM vaginal cream, Insert  into the vagina Daily., Disp: , Rfl:     famotidine (PEPCID) 10 MG tablet, Take 1 tablet by mouth 2 (Two) Times a Day., Disp: , Rfl:     levothyroxine (SYNTHROID, LEVOTHROID) 75 MCG tablet, TAKE 1 TABLET BY MOUTH EVERY DAY, Disp: 90 tablet, Rfl: 1    losartan (COZAAR) 25 MG tablet, Take 1 tablet by mouth Daily., Disp: 30 tablet, Rfl: 12    Minoxidil 5 % foam, Apply 1 application topically As Needed., Disp: , Rfl:     Misc Natural Products (COLON CLEANSER PO), Take  by mouth. HOLD 5-7  days before surgery, Disp: , Rfl:     multivitamin with minerals (HAIR SKIN AND NAILS FORMULA PO), Take 1 tablet by mouth Daily., Disp: , Rfl:     multivitamin with minerals  tablet tablet, Take 1 tablet by mouth Daily. Bariatric, Disp: , Rfl:     NON FORMULARY, 4 (Four) Times a Day. Weem, Disp: , Rfl:     omeprazole (priLOSEC) 40 MG capsule, Take 1 capsule by mouth Daily., Disp: 30 capsule, Rfl: 6    polyethylene glycol (MiraLax) 17 GM/SCOOP powder, mix 1 capful (17 grams) in liquid and drink by mouth daily (Patient taking differently: 17 g Daily. Pt taking on occation), Disp: 510 g, Rfl: 1    Xiidra 5 % ophthalmic solution, instill 1 drop into both eyes twice a day, Disp: , Rfl:     ferrous sulfate 325 (65 FE) MG tablet, Take 1 tablet by mouth Daily With Breakfast., Disp: 30 tablet, Rfl: 2    Past Surgical History:  Past Surgical History:   Procedure Laterality Date    BREAST SURGERY Right 2012    biopsy    COLONOSCOPY  2018 2018 2023    ENDOSCOPY N/A 02/09/2023    Procedure: ESOPHAGOGASTRODUODENOSCOPY with gastric biopsy;  Surgeon: Yue Jones MD;  Location: [facility] ENDOSCOPY;  Service: General;  Laterality: N/A;  normal    ENDOSCOPY N/A 11/30/2023    Procedure: ESOPHAGOGASTRODUODENOSCOPY with esophageal balloon dilation to 20mm;  Surgeon: Yue Jones MD;  Location: [facility] ENDOSCOPY;  Service: General;  Laterality: N/A;  stricture of gastric body    GASTRIC SLEEVE LAPAROSCOPIC N/A 03/16/2023    Procedure: GASTRIC SLEEVE LAPAROSCOPIC WITH DAVINCI ROBOT; HIATAL HERNIA REPAIR;  Surgeon: Yue Jones MD;  Location: [facility] MAIN OR;  Service: Robotics - Hua Kanginci;  Laterality: N/A;    HIATAL HERNIA REPAIR N/A 12/6/2023    Procedure: LAPAROSCOPIC HIATAL HERNIA REPAIR; LIGAMENTUN TERES CARDIOPEXY WITH DAVINCI ROBOT;  Surgeon: Yue Jones MD;  Location: [facility] MAIN OR;  Service: Robotics - Hua Kanginci;  Laterality: N/A;    TOENAIL EXCISION      01/2022    TUBAL ABDOMINAL LIGATION  1993         Physical Exam:      Vital Signs:    Vitals:    06/20/24 0859   BP: 126/84   Pulse:    Resp:    Temp:    SpO2:         /84   Pulse 90   Temp 96.6 °F (35.9 °C) (Temporal)   Resp 17   Ht 170.2  "cm (67.01\")   Wt 103 kg (226 lb 12.8 oz)   LMP  (LMP Unknown)   SpO2 98% Comment: room air  BMI 35.51 kg/m²     Wt Readings from Last 3 Encounters:   06/20/24 103 kg (226 lb 12.8 oz)   05/29/24 101 kg (223 lb 11.2 oz)   05/06/24 102 kg (225 lb 3.2 oz)       Result Review :   The following data was reviewed by: Miryam Martinez MD on 06/20/2024:  CMP          12/6/2023    20:06 2/5/2024    10:34 6/13/2024    11:21   CMP   Glucose 129  97  88    BUN 9  10  8    Creatinine 0.85  0.91  0.82    EGFR 76.1      Sodium 137  141  138    Potassium 4.2  4.8  4.9    Chloride 103  104  102    Calcium 8.8  10.0  9.7    Total Protein  7.4  7.1    Total Protein 6.7      Albumin 4.0  4.8  4.6    Globulin  2.6  2.5    Globulin 2.7      Total Bilirubin 0.2  0.3  0.2    Alkaline Phosphatase 68  93  88    AST (SGOT) 33  30  21    ALT (SGPT) 25  24  16    Albumin/Globulin Ratio 1.5      BUN/Creatinine Ratio 10.6  11  10    Anion Gap 10.0        Lipid Panel          2/5/2024    10:34 6/13/2024    11:21   Lipid Panel   Total Cholesterol 254  215    Triglycerides 147  136    HDL Cholesterol 78  61    VLDL Cholesterol 26  24    LDL Cholesterol  150  130             TSH          2/5/2024    10:34 6/13/2024    11:21   TSH   TSH 4.260  2.380          Physical Exam  Vitals reviewed.   Constitutional:       Appearance: Normal appearance. She is well-developed.   HENT:      Head: Normocephalic and atraumatic.   Eyes:      General:         Right eye: No discharge.         Left eye: No discharge.   Cardiovascular:      Rate and Rhythm: Normal rate and regular rhythm.      Heart sounds: Normal heart sounds. No murmur heard.     No friction rub. No gallop.   Pulmonary:      Effort: Pulmonary effort is normal. No respiratory distress.      Breath sounds: Normal breath sounds. No wheezing or rales.   Musculoskeletal:      Right hip: Tenderness (to palpation and IT band) present.   Skin:     General: Skin is warm and dry.      Findings: No rash.      " Comments: Lesion mid chest   Neurological:      Mental Status: She is alert and oriented to person, place, and time.      Coordination: Coordination normal.      Gait: Gait normal.   Psychiatric:         Behavior: Behavior is cooperative.         Assessment and Plan:  Problems Addressed this Visit          Endocrine and Metabolic    Class 2 severe obesity due to excess calories with serious comorbidity and body mass index (BMI) of 35.0 to 35.9 in adult       Musculoskeletal and Injuries    Left hip pain    Relevant Orders    XR Hip With or Without Pelvis 2 - 3 View Left (Completed)    Iliotibial band syndrome of left side       Skin    Skin lesion of chest wall     She is going to call her dermatologist               Tobacco    History of tobacco use     Other Visit Diagnoses       Left arm pain    -  Primary    Left leg pain              Diagnoses         Codes Comments    Left arm pain    -  Primary ICD-10-CM: M79.602  ICD-9-CM: 729.5     Left leg pain     ICD-10-CM: M79.605  ICD-9-CM: 729.5     Class 2 severe obesity due to excess calories with serious comorbidity and body mass index (BMI) of 35.0 to 35.9 in adult     ICD-10-CM: E66.01, Z68.35  ICD-9-CM: 278.01, V85.35     History of tobacco use     ICD-10-CM: Z87.891  ICD-9-CM: V15.82     Skin lesion of chest wall     ICD-10-CM: L98.9  ICD-9-CM: 709.9     Left hip pain     ICD-10-CM: M25.552  ICD-9-CM: 719.45     Iliotibial band syndrome of left side     ICD-10-CM: M76.32  ICD-9-CM: 728.89                          An After Visit Summary and PPPS were given to the patient.

## 2024-06-20 ENCOUNTER — TREATMENT (OUTPATIENT)
Dept: PHYSICAL THERAPY | Facility: CLINIC | Age: 64
End: 2024-06-20
Payer: COMMERCIAL

## 2024-06-20 ENCOUNTER — HOSPITAL ENCOUNTER (OUTPATIENT)
Dept: GENERAL RADIOLOGY | Facility: HOSPITAL | Age: 64
Discharge: HOME | End: 2024-06-20
Admitting: FAMILY MEDICINE
Payer: COMMERCIAL

## 2024-06-20 ENCOUNTER — OFFICE VISIT (OUTPATIENT)
Dept: FAMILY MEDICINE CLINIC | Facility: CLINIC | Age: 64
End: 2024-06-20
Payer: COMMERCIAL

## 2024-06-20 VITALS
OXYGEN SATURATION: 98 % | DIASTOLIC BLOOD PRESSURE: 84 MMHG | HEIGHT: 67 IN | WEIGHT: 226.8 LBS | SYSTOLIC BLOOD PRESSURE: 126 MMHG | RESPIRATION RATE: 17 BRPM | HEART RATE: 90 BPM | TEMPERATURE: 96.6 F | BODY MASS INDEX: 35.6 KG/M2

## 2024-06-20 DIAGNOSIS — R53.1 WEAKNESS GENERALIZED: ICD-10-CM

## 2024-06-20 DIAGNOSIS — M76.32 ILIOTIBIAL BAND SYNDROME OF LEFT SIDE: ICD-10-CM

## 2024-06-20 DIAGNOSIS — M79.602 LEFT ARM PAIN: Primary | ICD-10-CM

## 2024-06-20 DIAGNOSIS — M54.12 RADICULOPATHY, CERVICAL: Primary | ICD-10-CM

## 2024-06-20 DIAGNOSIS — M79.605 LEFT LEG PAIN: ICD-10-CM

## 2024-06-20 DIAGNOSIS — M25.552 PAIN OF LEFT HIP: ICD-10-CM

## 2024-06-20 DIAGNOSIS — Z87.891 HISTORY OF TOBACCO USE: ICD-10-CM

## 2024-06-20 DIAGNOSIS — L98.9 SKIN LESION OF CHEST WALL: ICD-10-CM

## 2024-06-20 DIAGNOSIS — M25.512 ACUTE PAIN OF LEFT SHOULDER: ICD-10-CM

## 2024-06-20 DIAGNOSIS — M25.552 LEFT HIP PAIN: ICD-10-CM

## 2024-06-20 DIAGNOSIS — E66.01 CLASS 2 SEVERE OBESITY DUE TO EXCESS CALORIES WITH SERIOUS COMORBIDITY AND BODY MASS INDEX (BMI) OF 35.0 TO 35.9 IN ADULT: ICD-10-CM

## 2024-06-20 DIAGNOSIS — M54.16 RADICULOPATHY, LUMBAR REGION: ICD-10-CM

## 2024-06-20 PROCEDURE — 3079F DIAST BP 80-89 MM HG: CPT | Performed by: FAMILY MEDICINE

## 2024-06-20 PROCEDURE — 99214 OFFICE O/P EST MOD 30 MIN: CPT | Performed by: FAMILY MEDICINE

## 2024-06-20 PROCEDURE — 1159F MED LIST DOCD IN RCRD: CPT | Performed by: FAMILY MEDICINE

## 2024-06-20 PROCEDURE — 1160F RVW MEDS BY RX/DR IN RCRD: CPT | Performed by: FAMILY MEDICINE

## 2024-06-20 PROCEDURE — 73502 X-RAY EXAM HIP UNI 2-3 VIEWS: CPT

## 2024-06-20 PROCEDURE — 1125F AMNT PAIN NOTED PAIN PRSNT: CPT | Performed by: FAMILY MEDICINE

## 2024-06-20 PROCEDURE — 3074F SYST BP LT 130 MM HG: CPT | Performed by: FAMILY MEDICINE

## 2024-06-20 NOTE — PROGRESS NOTES
Physical Therapy Daily Treatment Note      Patient: Tirso Palomo   : 1960  Diagnosis/ICD-10 Code:  Radiculopathy, cervical [M54.12]   Problems Addressed this Visit    None  Visit Diagnoses       Radiculopathy, cervical    -  Primary    Radiculopathy, lumbar region        Acute pain of left shoulder        Pain of left hip        Weakness generalized              Diagnoses         Codes Comments    Radiculopathy, cervical    -  Primary ICD-10-CM: M54.12  ICD-9-CM: 723.4     Radiculopathy, lumbar region     ICD-10-CM: M54.16  ICD-9-CM: 724.4     Acute pain of left shoulder     ICD-10-CM: M25.512  ICD-9-CM: 719.41     Pain of left hip     ICD-10-CM: M25.552  ICD-9-CM: 719.45     Weakness generalized     ICD-10-CM: R53.1  ICD-9-CM: 780.79           Referring practitioner: Miryam Martinez MD  Date of Initial Visit: Type: THERAPY  Noted: 5/15/2024  Today's Date: 2024    VISIT#: 6    Subjective : she has swam and walked in her friend's pool 3x since last visit. Getting a hip x-ray after PT visit today. Thinks she may have over done it. Still having pain at along side of L thigh.    Objective :     See Exercise, Manual, and Modality Logs for complete treatment.     Assessment/Plan : Increased pain at start of session along L ITB. Responds well to ultrasound to L greater trochanter and use of percussion gun to L ITB. Good tolerance to ther ex progression. Cues for scapular awareness during mid rows. Pt will benefit from continued scapular and mid back strengthening along with progression of core and B LE strengthening to decrease pain.      Goals  Plan Goals: STG to be met in 3 wk:  - Pt to report 50% improvement in L UE radicular symptoms. - MET  - Pt to report 50% decrease in L lateral thigh pain when walking. - Progressing  - Pt to demonstrated improved posture with min verbal cues. - Progressing  LTG to be met in 12 wk:  - Improve Quick DASH to 20% or less impairment.  - Increase L shoulder ext  rotation and IR strength to 4+/5 for improved stability when reaching overhead.  - Increase B hip strength to 4/5 or greater in all directions for ease with all transitional movements.  - Pt to report ability to walk >30 min at moderate intensity with max L thigh pain rating at 3/10 in order to walk 5K next month.    Progress per Plan of Care and Progress strengthening /stabilization /functional activity      Timed:         Manual Therapy:    10     mins  44569;     Therapeutic Exercise:    15     mins  59632;     Neuromuscular Ti:        mins  74361;    Therapeutic Activity:     8     mins  63090;     Gait Training:           mins  00796;     Ultrasound:     8     mins  64744;    Ionto                                   mins   98245  Self Care                            mins   71016    Un-Timed:  Electrical Stimulation:         mins  07407 ( );  Dry Needling          mins 19761/64838  Traction     15     mins 54872  Canalith Repos                   mins  50386  Low Eval          Mins  68058  Mod Eval          Mins  05858  High Eval                            Mins  27930  Re-Eval                               mins  83900    Timed Treatment:   41   mins   Total Treatment:     56   mins    Yamel Emmanuel, PT, CLT, Cert DN  Physical Therapist  [place] Lic # [id number]

## 2024-06-26 ENCOUNTER — TREATMENT (OUTPATIENT)
Dept: PHYSICAL THERAPY | Facility: CLINIC | Age: 64
End: 2024-06-26
Payer: COMMERCIAL

## 2024-06-26 DIAGNOSIS — E66.9 OBESITY, CLASS II, BMI 35-39.9: ICD-10-CM

## 2024-06-26 DIAGNOSIS — M25.552 PAIN OF LEFT HIP: ICD-10-CM

## 2024-06-26 DIAGNOSIS — D50.9 IRON DEFICIENCY ANEMIA, UNSPECIFIED IRON DEFICIENCY ANEMIA TYPE: ICD-10-CM

## 2024-06-26 DIAGNOSIS — R53.1 WEAKNESS GENERALIZED: ICD-10-CM

## 2024-06-26 DIAGNOSIS — M54.16 RADICULOPATHY, LUMBAR REGION: ICD-10-CM

## 2024-06-26 DIAGNOSIS — M25.512 ACUTE PAIN OF LEFT SHOULDER: ICD-10-CM

## 2024-06-26 DIAGNOSIS — M54.12 RADICULOPATHY, CERVICAL: Primary | ICD-10-CM

## 2024-06-26 DIAGNOSIS — Z90.3 H/O GASTRIC SLEEVE: ICD-10-CM

## 2024-06-26 NOTE — PROGRESS NOTES
Physical Therapy Progress Note/Reassessment  [address] [place] [address] [place] [address]    Patient: Tirso Palomo   : 1960  Diagnosis/ICD-10 Code:  Radiculopathy, cervical [M54.12]  Referring practitioner: Miryam Martinez MD  Date of Initial Evaluation:  Type: THERAPY  Noted: 5/15/2024  Patient seen for 7 sessions      Subjective:   Visit Diagnoses:    ICD-10-CM ICD-9-CM   1. Radiculopathy, cervical  M54.12 723.4   2. Radiculopathy, lumbar region  M54.16 724.4   3. Acute pain of left shoulder  M25.512 719.41   4. Pain of left hip  M25.552 719.45   5. Weakness generalized  R53.1 780.79         Tirso Palomo reports she was in the car a long time yesterday picking up her granddaughter and hip was hurting during that. Had pain from side of L hip all the way down her leg to the ankle. States it seemed to be her whole leg (front and back). No back pain. Was in the car for ~10-11 hrs. Neck and L arm were feeling good with no pain. States she is able to walk 10-20 min at a moderate intensity with no increase in L LE pain.  Subjective Questionnaire: not completed  Clinical Progress: improved  Home Program Compliance: Yes  Treatment has included: therapeutic exercise, neuromuscular re-education, manual therapy, therapeutic activity, ultrasound, traction, and moist heat      Objective :   L shoulder IR and ER = 4+/5    Hip flex R = 4/5, L = 4-/5  Hip abd R = 4+/5, L = 4/5    Performed lumbar traction this date to address L LE burning pain she experienced over the weekend.    See Exercise, Manual, and Modality Logs for complete treatment.     Assessment/Plan : Pt reporting no neck pain and full resolution of L UE and shoulder pain. Increased L LE pain after being in the car for prolonged time picking up their granddaughter. Pain radiated to ankle during this time. Pt will benefit from continued scapular and mid back strengthening along with progression of core and B LE strengthening to decrease pain.        Goals  Plan Goals: STG to be met in 3 wk:  - Pt to report 50% improvement in L UE radicular symptoms. - MET  - Pt to report 50% decrease in L lateral thigh pain when walking. - Progressing  - Pt to demonstrated improved posture with min verbal cues. - Progressing  LTG to be met in 12 wk:  - Improve Quick DASH to 20% or less impairment.  - Increase L shoulder ext rotation and IR strength to 4+/5 for improved stability when reaching overhead. - MET  - Increase B hip strength to 4/5 or greater in all directions for ease with all transitional movements. - Progressing  - Pt to report ability to walk >30 min at moderate intensity with max L thigh pain rating at 3/10 in order to walk 5K next month. - Progressing       Recommendations: Continue as planned  Timeframe: 2 months  Prognosis to achieve goals: good      Timed:         Manual Therapy:    15     mins  75181;     Therapeutic Exercise:    15     mins  80040;     Neuromuscular Ti:        mins  86824;    Therapeutic Activity:          mins  98820;     Gait Training:           mins  37645;     Ultrasound:     8     mins  76827;    Ionto                                   mins   17606  Self Care                            mins   22929      Un-Timed:  Electrical Stimulation:         mins  82866 ( );  Dry Needling          mins self-pay  Traction     15     mins 74097  Canalith Repos         mins 14586      Timed Treatment:   38   mins   Total Treatment:     53   mins    PT Signature: Yamel Emmanuel PT, CLT  PT License: [place] Lic # [id number]

## 2024-06-27 ENCOUNTER — TELEPHONE (OUTPATIENT)
Dept: BARIATRICS/WEIGHT MGMT | Facility: CLINIC | Age: 64
End: 2024-06-27
Payer: COMMERCIAL

## 2024-06-27 ENCOUNTER — TELEPHONE (OUTPATIENT)
Dept: FAMILY MEDICINE CLINIC | Facility: CLINIC | Age: 64
End: 2024-06-27
Payer: COMMERCIAL

## 2024-06-27 DIAGNOSIS — Z90.3 H/O GASTRIC SLEEVE: Primary | ICD-10-CM

## 2024-06-27 DIAGNOSIS — Z98.890 HISTORY OF REPAIR OF HIATAL HERNIA: ICD-10-CM

## 2024-06-27 DIAGNOSIS — R13.10 DYSPHAGIA, UNSPECIFIED TYPE: ICD-10-CM

## 2024-06-27 DIAGNOSIS — Z87.19 HISTORY OF REPAIR OF HIATAL HERNIA: ICD-10-CM

## 2024-06-27 DIAGNOSIS — M16.10 ARTHRITIS OF HIP: ICD-10-CM

## 2024-06-27 DIAGNOSIS — M25.552 LEFT HIP PAIN: Primary | ICD-10-CM

## 2024-06-27 NOTE — TELEPHONE ENCOUNTER
----- Message from [facility] Grey sent at 6/27/2024  9:06 AM EDT -----  Regarding: results  Contact: [phone]  Thanks for message. I would be OK with PT for hip. Do I need a new referral?  FRANCES Mcmillan is addressing the low iron sat level. I will keep your office informed.   Thanks for all you do.. tell Dr Michelle sampson..  Thanks

## 2024-06-27 NOTE — TELEPHONE ENCOUNTER
----- Message from [facility] sent at 6/27/2024  9:06 AM EDT -----  Regarding: results  Contact: [phone]  Thanks for message. I would be OK with PT for hip. Do I need a new referral?  FRANCES Mcmillan is addressing the low iron sat level. I will keep your office informed.   Thanks for all you do.. tell Dr Michelle sampson..  Thanks

## 2024-06-27 NOTE — TELEPHONE ENCOUNTER
----- Message from [facility] sent at 6/27/2024  9:06 AM EDT -----  Regarding: results  Contact: [phone]  Thanks for message. I would be OK with PT for hip. Do I need a new referral?  FRANCES Mcmillan is addressing the low iron sat level. I will keep your office informed.   Thanks for all you do.. tell Dr Michelle sampson..  Thanks

## 2024-06-27 NOTE — PROGRESS NOTES
Iron sat low but total iron ok , on lower side of normal. Please see if she is taking a bariatric multi with iron. If having a lot of fatigue, can try for iron infusion.

## 2024-06-28 ENCOUNTER — TELEPHONE (OUTPATIENT)
Dept: BARIATRICS/WEIGHT MGMT | Facility: CLINIC | Age: 64
End: 2024-06-28
Payer: COMMERCIAL

## 2024-06-28 RX ORDER — FERROUS SULFATE 325(65) MG
325 TABLET ORAL
Qty: 30 TABLET | Refills: 2 | Status: SHIPPED | OUTPATIENT
Start: 2024-06-28

## 2024-06-28 NOTE — TELEPHONE ENCOUNTER
Pt states she would prefer oral iron instead of iron infusions. She is taking a multi-vitamin with iron.

## 2024-07-02 ENCOUNTER — TELEPHONE (OUTPATIENT)
Dept: FAMILY MEDICINE CLINIC | Facility: CLINIC | Age: 64
End: 2024-07-02
Payer: COMMERCIAL

## 2024-07-02 NOTE — TELEPHONE ENCOUNTER
Usha is asking for verbal authorization to back date hip pain referral to May since original referral to physical therapy was in May for lower back pain. Please advise.

## 2024-07-05 ENCOUNTER — TREATMENT (OUTPATIENT)
Dept: PHYSICAL THERAPY | Facility: CLINIC | Age: 64
End: 2024-07-05
Payer: COMMERCIAL

## 2024-07-05 DIAGNOSIS — M54.16 RADICULOPATHY, LUMBAR REGION: ICD-10-CM

## 2024-07-05 DIAGNOSIS — R53.1 WEAKNESS GENERALIZED: ICD-10-CM

## 2024-07-05 DIAGNOSIS — M54.12 RADICULOPATHY, CERVICAL: Primary | ICD-10-CM

## 2024-07-05 DIAGNOSIS — M25.512 ACUTE PAIN OF LEFT SHOULDER: ICD-10-CM

## 2024-07-05 DIAGNOSIS — M25.552 PAIN OF LEFT HIP: ICD-10-CM

## 2024-07-05 NOTE — PROGRESS NOTES
Physical Therapy Daily Treatment Note      Patient: Tirso Palomo   : 1960  Diagnosis/ICD-10 Code:  Radiculopathy, cervical [M54.12]   Problems Addressed this Visit    None  Visit Diagnoses       Radiculopathy, cervical    -  Primary    Radiculopathy, lumbar region        Acute pain of left shoulder        Pain of left hip        Weakness generalized              Diagnoses         Codes Comments    Radiculopathy, cervical    -  Primary ICD-10-CM: M54.12  ICD-9-CM: 723.4     Radiculopathy, lumbar region     ICD-10-CM: M54.16  ICD-9-CM: 724.4     Acute pain of left shoulder     ICD-10-CM: M25.512  ICD-9-CM: 719.41     Pain of left hip     ICD-10-CM: M25.552  ICD-9-CM: 719.45     Weakness generalized     ICD-10-CM: R53.1  ICD-9-CM: 780.79           Referring practitioner: Miryam Martinez MD  Date of Initial Visit: Type: THERAPY  Noted: 5/15/2024  Today's Date: 2024    VISIT#: 8    Subjective : Pt reports traction gave her several days of relief with no L LE pain. Pt states pain came back about 2 days ago. Wants to do traction again today.    Objective : added PPT with marching. Issued copy of exercises for HEP.    See Exercise, Manual, and Modality Logs for complete treatment.     Assessment/Plan : Pt responded well to traction at last visit with several days of no pain. Good tolerance to ther ex progression with addition of core strengthening. No L greater trochanteric pain. Pt will benefit from continued scapular and mid back strengthening along with progression of core and B LE strengthening to decrease pain.      Goals  Plan Goals: STG to be met in 3 wk:  - Pt to report 50% improvement in L UE radicular symptoms. - MET  - Pt to report 50% decrease in L lateral thigh pain when walking. - Progressing  - Pt to demonstrated improved posture with min verbal cues. - Progressing  LTG to be met in 12 wk:  - Improve Quick DASH to 20% or less impairment.  - Increase L shoulder ext rotation and IR strength to  4+/5 for improved stability when reaching overhead. - MET  - Increase B hip strength to 4/5 or greater in all directions for ease with all transitional movements. - Progressing  - Pt to report ability to walk >30 min at moderate intensity with max L thigh pain rating at 3/10 in order to walk 5K next month. - Progressing    Progress per Plan of Care and Progress strengthening /stabilization /functional activity      Timed:         Manual Therapy:    8     mins  98847;     Therapeutic Exercise:    15     mins  61730;     Neuromuscular Ti:        mins  84746;    Therapeutic Activity:          mins  73838;     Gait Training:           mins  38084;     Ultrasound:          mins  02857;    Ionto                                   mins   80446  Self Care                            mins   78336    Un-Timed:  Electrical Stimulation:         mins  43580 ( );  Dry Needling          mins 17376/34331  Traction     15     mins 76474  Canalith Repos                   mins  52237  Low Eval          Mins  88357  Mod Eval          Mins  02526  High Eval                            Mins  07196  Re-Eval                               mins  74471    Timed Treatment:   23   mins   Total Treatment:     38   mins    Yamel Emmanuel, PT, CLT, Cert DN  Physical Therapist  [place] Lic # [id number]

## 2024-07-10 ENCOUNTER — TREATMENT (OUTPATIENT)
Dept: PHYSICAL THERAPY | Facility: CLINIC | Age: 64
End: 2024-07-10
Payer: COMMERCIAL

## 2024-07-10 DIAGNOSIS — M25.552 PAIN OF LEFT HIP: ICD-10-CM

## 2024-07-10 DIAGNOSIS — M54.16 RADICULOPATHY, LUMBAR REGION: Primary | ICD-10-CM

## 2024-07-10 DIAGNOSIS — R53.1 WEAKNESS GENERALIZED: ICD-10-CM

## 2024-07-10 NOTE — PROGRESS NOTES
Physical Therapy Daily Treatment Note      Patient: Tirso Palomo   : 1960  Diagnosis/ICD-10 Code:  Radiculopathy, lumbar region [M54.16]   Problems Addressed this Visit    None  Visit Diagnoses       Radiculopathy, lumbar region    -  Primary    Pain of left hip        Weakness generalized              Diagnoses         Codes Comments    Radiculopathy, lumbar region    -  Primary ICD-10-CM: M54.16  ICD-9-CM: 724.4     Pain of left hip     ICD-10-CM: M25.552  ICD-9-CM: 719.45     Weakness generalized     ICD-10-CM: R53.1  ICD-9-CM: 780.79           Referring practitioner: Miryam Martinez MD  Date of Initial Visit: Type: THERAPY  Noted: 5/15/2024  Today's Date: 7/10/2024    VISIT#: 9    Subjective : Pt reports she has been feeling better since last visit. Still having pain side of L hip when lifting leg up to put her shoes on. Pain down side of leg to knee is gone. [place] with [place]. Did not walk fast, was walking with her grandson and was on a trail.      Objective     See Exercise, Manual, and Modality Logs for complete treatment.     Assessment/Plan : Pt responding well to traction with decreased L LE pain. Pt reports neck pain and L shoulder pain have resolved. Pt making progress toward goals and will benefit from continued core and B hip strengthening.    Progress per Plan of Care and Progress strengthening /stabilization /functional activity         Timed:         Manual Therapy:    10     mins  18984;     Therapeutic Exercise:    5     mins  96296;     Neuromuscular Ti:        mins  89343;    Therapeutic Activity:          mins  68216;     Gait Training:           mins  65624;     Ultrasound:     10     mins  92601;    Ionto                                   mins   74675  Self Care                            mins   93578    Un-Timed:  Electrical Stimulation:         mins  01193 ( );  Dry Needling          mins /  Traction     15     mins 66397  [facility] Repos                    mins  15873  Low Eval          Mins  11401  Mod Eval          Mins  27215  High Eval                            Mins  11742  Re-Eval                               mins  84233    Timed Treatment:   25   mins   Total Treatment:     40   mins    Yamel Emmanuel PT, CLT, Cert DN  Physical Therapist  [place] Lic # [id number]

## 2024-07-11 ENCOUNTER — HOSPITAL ENCOUNTER (OUTPATIENT)
Dept: GENERAL RADIOLOGY | Facility: HOSPITAL | Age: 64
Discharge: HOME | End: 2024-07-11
Admitting: NURSE PRACTITIONER
Payer: COMMERCIAL

## 2024-07-11 DIAGNOSIS — Z98.890 HISTORY OF REPAIR OF HIATAL HERNIA: ICD-10-CM

## 2024-07-11 DIAGNOSIS — Z87.19 HISTORY OF REPAIR OF HIATAL HERNIA: ICD-10-CM

## 2024-07-11 DIAGNOSIS — Z90.3 H/O GASTRIC SLEEVE: ICD-10-CM

## 2024-07-11 DIAGNOSIS — R13.10 DYSPHAGIA, UNSPECIFIED TYPE: ICD-10-CM

## 2024-07-11 PROCEDURE — 25510000001 IOPAMIDOL PER 1 ML: Performed by: NURSE PRACTITIONER

## 2024-07-11 PROCEDURE — 74240 X-RAY XM UPR GI TRC 1CNTRST: CPT

## 2024-07-11 RX ADMIN — IOPAMIDOL 200 ML: 755 INJECTION, SOLUTION INTRAVENOUS at 10:55

## 2024-07-23 ENCOUNTER — TREATMENT (OUTPATIENT)
Dept: PHYSICAL THERAPY | Facility: CLINIC | Age: 64
End: 2024-07-23
Payer: COMMERCIAL

## 2024-07-23 DIAGNOSIS — M25.552 PAIN OF LEFT HIP: ICD-10-CM

## 2024-07-23 DIAGNOSIS — R53.1 WEAKNESS GENERALIZED: ICD-10-CM

## 2024-07-23 DIAGNOSIS — M54.16 RADICULOPATHY, LUMBAR REGION: Primary | ICD-10-CM

## 2024-07-23 DIAGNOSIS — M54.12 RADICULOPATHY, CERVICAL: ICD-10-CM

## 2024-07-23 DIAGNOSIS — M25.512 ACUTE PAIN OF LEFT SHOULDER: ICD-10-CM

## 2024-07-23 RX ORDER — FERROUS SULFATE 325(65) MG
325 TABLET ORAL
Qty: 30 TABLET | Refills: 2 | Status: SHIPPED | OUTPATIENT
Start: 2024-07-23

## 2024-07-23 NOTE — PROGRESS NOTES
Physical Therapy Daily Treatment Note      Patient: Tirso Palomo   : 1960  Diagnosis/ICD-10 Code:  Radiculopathy, lumbar region [M54.16]   Problems Addressed this Visit    None  Visit Diagnoses       Radiculopathy, lumbar region    -  Primary    Pain of left hip        Weakness generalized        Radiculopathy, cervical        Acute pain of left shoulder              Diagnoses         Codes Comments    Radiculopathy, lumbar region    -  Primary ICD-10-CM: M54.16  ICD-9-CM: 724.4     Pain of left hip     ICD-10-CM: M25.552  ICD-9-CM: 719.45     Weakness generalized     ICD-10-CM: R53.1  ICD-9-CM: 780.79     Radiculopathy, cervical     ICD-10-CM: M54.12  ICD-9-CM: 723.4     Acute pain of left shoulder     ICD-10-CM: M25.512  ICD-9-CM: 719.41           Referring practitioner: Miryam Martinez MD  Date of Initial Visit: Type: THERAPY  Noted: 5/15/2024  Today's Date: 2024    VISIT#: 10    Subjective : Pt states she wants today to be her last PT visit until she sees Dr. Martinez. States everything feels good and only has pain in L hip at night. States it is a burning and rates at 5/10 at night. Plans to ask MD about injection for bursitis. States she is walking more than 30 min with no pain in L hip. Going up steps leading with L LE now with no difficulty. Pt states she is really enjoying the core exercises that PT provided at past visits.  States her R shoulder is good now and no neck pain.  Pt concerned regarding having a large bill with [employer] in regards to Medicare wellness visit. States she had tried to tell billing office that insurance told her claim was denied due to wrong NPI number but still has balance from appt 5 mo ago.    Objective :  B hip flex = 4+/5  B hip abd = 4+/5    Tenderness to palpation only over at L greater trochanter.     Instructed pt in bridges with LE's on physioball and using physioball for isometric core exercises in hooklying. Pt demonstrated good understanding and ability.  Issued copy of exercises for HEP.    Encouraged pt to maintain contact with billing office for completion of claim. Pt verbalized understanding.  See Exercise, Manual, and Modality Logs for complete treatment.     Assessment/Plan : Pt doing well with L greater trochanter pain only occurring at night. Pt has met 6 of 7 goals at this time. She has no R shoulder pain and reports no difficulty with use of R UE. Has returned to walking at slower pace and does bursts of increased speed with no L ITB pain. Pt to continue with HEP at this time. Will call if MD recommends continued PT.    Goals  Plan Goals: STG to be met in 3 wk:  - Pt to report 50% improvement in L UE radicular symptoms. - MET  - Pt to report 50% decrease in L lateral thigh pain when walking. - MET  - Pt to demonstrated improved posture with min verbal cues. - MET  LTG to be met in 12 wk:  - Improve Quick DASH to 20% or less impairment. - MET  - Increase L shoulder ext rotation and IR strength to 4+/5 for improved stability when reaching overhead. - MET  - Increase B hip strength to 4/5 or greater in all directions for ease with all transitional movements. - MET  - Pt to report ability to walk >30 min at moderate intensity with max L thigh pain rating at 3/10 in order to walk 5K next month. - Partially met    Plan to DC with HEP if no additional appts scheduled before 8/20/2024.         Timed:         Manual Therapy:         mins  62964;     Therapeutic Exercise:    20     mins  07542;     Neuromuscular Ti:    10    mins  57695;    Therapeutic Activity:          mins  95372;     Gait Training:           mins  49527;     Ultrasound:     8     mins  04338;    Ionto                                   mins   58525  Self Care                            mins   33378    Un-Timed:  Electrical Stimulation:         mins  59524 ( );  Dry Needling          mins 20561/20560  Traction     15     mins 55992  [facility] Repos                   mins  89851  Low Eval           Mins  26664  Mod Eval          Mins  54070  High Eval                            Mins  88817  Re-Eval                               mins  81778    Timed Treatment:   38   mins   Total Treatment:     53   mins    Yamel Emmanuel PT, CLT, Cert DN  Physical Therapist  [place] Lic # [id number]

## 2024-07-25 DIAGNOSIS — I10 ESSENTIAL HYPERTENSION: ICD-10-CM

## 2024-07-25 RX ORDER — LOSARTAN POTASSIUM 25 MG/1
25 TABLET ORAL DAILY
Qty: 90 TABLET | Refills: 3 | Status: SHIPPED | OUTPATIENT
Start: 2024-07-25

## 2024-08-05 ENCOUNTER — TELEPHONE (OUTPATIENT)
Dept: BARIATRICS/WEIGHT MGMT | Facility: CLINIC | Age: 64
End: 2024-08-05
Payer: COMMERCIAL

## 2024-08-05 NOTE — TELEPHONE ENCOUNTER
PT CALLED TO LET YOU KNOW HER IRON LEVEL WAS AT 13.5 AND SHE WAS ABLE TO DONATE BLOOD TODAY! JUST AN FYI FOR YOU

## 2024-09-01 DIAGNOSIS — E03.9 ACQUIRED HYPOTHYROIDISM: ICD-10-CM

## 2024-09-03 RX ORDER — LEVOTHYROXINE SODIUM 75 UG/1
75 TABLET ORAL DAILY
Qty: 90 TABLET | Refills: 1 | Status: SHIPPED | OUTPATIENT
Start: 2024-09-03

## 2024-09-12 ENCOUNTER — OFFICE VISIT (OUTPATIENT)
Dept: FAMILY MEDICINE CLINIC | Facility: CLINIC | Age: 64
End: 2024-09-12
Payer: COMMERCIAL

## 2024-09-12 VITALS
DIASTOLIC BLOOD PRESSURE: 88 MMHG | BODY MASS INDEX: 35.38 KG/M2 | HEART RATE: 90 BPM | RESPIRATION RATE: 18 BRPM | SYSTOLIC BLOOD PRESSURE: 132 MMHG | OXYGEN SATURATION: 98 % | WEIGHT: 225.4 LBS | TEMPERATURE: 97.5 F | HEIGHT: 67 IN

## 2024-09-12 DIAGNOSIS — M79.602 LEFT ARM PAIN: ICD-10-CM

## 2024-09-12 DIAGNOSIS — Z87.891 HISTORY OF TOBACCO USE: ICD-10-CM

## 2024-09-12 DIAGNOSIS — E03.9 ACQUIRED HYPOTHYROIDISM: ICD-10-CM

## 2024-09-12 DIAGNOSIS — M79.605 LEFT LEG PAIN: ICD-10-CM

## 2024-09-12 DIAGNOSIS — E78.2 MIXED HYPERLIPIDEMIA: ICD-10-CM

## 2024-09-12 DIAGNOSIS — I10 ESSENTIAL HYPERTENSION: ICD-10-CM

## 2024-09-12 DIAGNOSIS — L98.9 SKIN LESION OF CHEST WALL: Primary | ICD-10-CM

## 2024-09-12 DIAGNOSIS — E66.01 CLASS 2 SEVERE OBESITY DUE TO EXCESS CALORIES WITH SERIOUS COMORBIDITY AND BODY MASS INDEX (BMI) OF 35.0 TO 35.9 IN ADULT: ICD-10-CM

## 2024-09-12 DIAGNOSIS — M19.90 CHRONIC ARTHRITIS: ICD-10-CM

## 2024-09-12 DIAGNOSIS — E55.9 VITAMIN D DEFICIENCY: ICD-10-CM

## 2024-09-12 PROBLEM — K44.9 HIATAL HERNIA: Status: RESOLVED | Noted: 2023-12-07 | Resolved: 2024-09-12

## 2024-09-12 RX ORDER — CELECOXIB 200 MG/1
200 CAPSULE ORAL DAILY
Qty: 30 CAPSULE | Refills: 5 | Status: SHIPPED | OUTPATIENT
Start: 2024-09-12

## 2024-10-15 DIAGNOSIS — Z98.84 BARIATRIC SURGERY STATUS: Primary | ICD-10-CM

## 2024-10-17 ENCOUNTER — TELEPHONE (OUTPATIENT)
Dept: BARIATRICS/WEIGHT MGMT | Facility: CLINIC | Age: 64
End: 2024-10-17
Payer: COMMERCIAL

## 2024-10-17 NOTE — TELEPHONE ENCOUNTER
Called patient and [facility]. Received referral to Oriana Mcmillan. Patient is a current patient here and has an appointment scheduled for Nov.

## 2024-10-18 LAB
CHOLEST SERPL-MCNC: 232 MG/DL (ref 100–199)
CHOLEST/HDLC SERPL: 3.7 RATIO (ref 0–4.4)
HDLC SERPL-MCNC: 62 MG/DL
LDLC SERPL CALC-MCNC: 139 MG/DL (ref 0–99)
TRIGL SERPL-MCNC: 177 MG/DL (ref 0–149)
VLDLC SERPL CALC-MCNC: 31 MG/DL (ref 5–40)

## 2024-10-18 RX ORDER — FERROUS SULFATE 325(65) MG
1 TABLET ORAL
Qty: 90 TABLET | Refills: 1 | Status: SHIPPED | OUTPATIENT
Start: 2024-10-18

## 2024-10-31 ENCOUNTER — TELEPHONE (OUTPATIENT)
Dept: FAMILY MEDICINE CLINIC | Facility: CLINIC | Age: 64
End: 2024-10-31
Payer: COMMERCIAL

## 2024-11-22 ENCOUNTER — TELEMEDICINE (OUTPATIENT)
Dept: BARIATRICS/WEIGHT MGMT | Facility: CLINIC | Age: 64
End: 2024-11-22
Payer: COMMERCIAL

## 2024-11-22 VITALS — WEIGHT: 218 LBS | BODY MASS INDEX: 34.21 KG/M2 | HEIGHT: 67 IN

## 2024-11-22 DIAGNOSIS — Z90.3 H/O GASTRIC SLEEVE: Primary | ICD-10-CM

## 2024-11-22 DIAGNOSIS — E66.811 OBESITY, CLASS I, BMI 30-34.9: ICD-10-CM

## 2024-11-22 RX ORDER — OMEPRAZOLE 40 MG/1
40 CAPSULE, DELAYED RELEASE ORAL DAILY
Qty: 30 CAPSULE | Refills: 6 | Status: SHIPPED | OUTPATIENT
Start: 2024-11-22

## 2024-11-22 NOTE — PROGRESS NOTES
[facility] SUR[facility] BARIATRIC SURGERY  [address] [place] [address]  [address] [place] [address]  Dept: [phone]  11/22/2024      Tirso Palomo.  [id number]  [id number]  1960  female        You have chosen to receive care through a video visit. Do you consent to use a video visit for your medical care today? Yes    Location of patient: Home in [place]   Location of provider: provider home in [place]     Date of last surgery: 12/6/2023Laparoscopic Hiatal Hernia Repair; Ligamentun Teres Cardiopexy With Davinci Robot      Chief Complaint: BH Post-Op Bariatric Surgery:   Tirso Palomo is status post procedure listed above  HPI:     Wt Readings from Last 10 Encounters:   09/12/24 102 kg (225 lb 6.4 oz)   06/20/24 103 kg (226 lb 12.8 oz)   05/29/24 101 kg (223 lb 11.2 oz)   05/06/24 102 kg (225 lb 3.2 oz)   02/12/24 99.7 kg (219 lb 12.8 oz)   01/08/24 99.4 kg (219 lb 3.2 oz)   12/15/23 97.2 kg (214 lb 3.2 oz)   12/11/23 97.7 kg (215 lb 6.4 oz)   12/06/23 98.9 kg (218 lb)   12/04/23 99.7 kg (219 lb 12.8 oz)    Current weight: 218 pounds     Today's weight is  218  pounds,BMI 34.18 has a  loss of 6 pounds since the last visit and  weight loss since surgery is 27 pounds. The patient reports a decreased portion size and loss of appetite.      Tirso Palomo denies nausea and vomiting, some gerd on omeprazole, dysphagia slightly better     Diet and Exercise: Diet history reviewed and discussed with the patient. Weight loss/gains to date discussed with the patient.     She reports eating 3 meals per day, a typical portion size of 1/2 cup, eating 1 snacks per day, drinking 6 or more 8-oz. glasses of water per day, some carbonated beverage consumption and exercising - slowing increasing this.       The patient states they are eating 40 grams of protein per day.     No nausea/ vomiting except when eating fast   Activate drinks      Breakfast:special k / grapenuts , minimal milk   snacks: rice cakes, celery, granola bars, fruit   Lunch: Yunier noodles   Dinner: Eating out some , rice, feliciano   Drinks: bindu tea, diet root beer, coffee , buttermilk but less   Exercise: cleaning house, went through physical therapy , knee pain better     No protein supplements     Multi vitamin + iron         Review of Systems   Constitutional:  Positive for activity change and appetite change.   Respiratory: Negative.     Cardiovascular: Negative.    Gastrointestinal:  Positive for blood in stool and constipation.   Musculoskeletal:  Positive for arthralgias and back pain.         Patient Active Problem List   Diagnosis    Chronic fatigue    Essential hypertension    Snoring    Hemorrhoids    Multiple joint pain    Acquired hypothyroidism    Screening mammogram, encounter for    Screening for cervical cancer    Screening for colon cancer    Encounter for general adult medical examination with abnormal findings    Vitamin D deficiency    Urinary incontinence    Stress incontinence of urine    Spondylosis of lumbosacral spine without myelopathy    Class 2 severe obesity due to excess calories with serious comorbidity and body mass index (BMI) of 35.0 to 35.9 in adult    Menopause present    Hypermobility of urethra    History of colonic polyps    Female bladder prolapse    Abscess of right breast    Normal pelvic exam    Epidermal inclusion cyst    Status post sleeve gastrectomy    Chronic idiopathic constipation    History of tobacco use    Vaginal dryness    Alopecia    Change in bowel habit    Family history of colon cancer    H/O bariatric surgery    Hiatal hernia with GERD    Influenza vaccine needed    Mixed hyperlipidemia    Numerous moles    Cervical radiculopathy    Lumbar radiculopathy    Spasm of muscle of lower back    Skin lesion of chest wall    Left hip pain    Iliotibial band syndrome of left side    Chronic arthritis       Past Medical History:    Diagnosis Date    Allergic     Arthritis     Elevated cholesterol     Hiatal hernia     Hiatal hernia with GERD 12/04/2023    Hormone replacement therapy     Hypertension     Hypothyroidism     Insomnia     Sciatica      Past Surgical History:   Procedure Laterality Date    TUBAL ABDOMINAL LIGATION  1993    BREAST SURGERY Right 2012    biopsy    COLONOSCOPY  2018 2018 2023    ENDOSCOPY N/A 02/09/2023    Procedure: ESOPHAGOGASTRODUODENOSCOPY with gastric biopsy;  Surgeon: Yue Jones MD;  Location: [facility] ENDOSCOPY;  Service: General;  Laterality: N/A;  normal    GASTRIC SLEEVE LAPAROSCOPIC N/A 03/16/2023    Procedure: GASTRIC SLEEVE LAPAROSCOPIC WITH DAVINCI ROBOT; HIATAL HERNIA REPAIR;  Surgeon: Yue Jones MD;  Location: [facility] MAIN OR;  Service: Robotics - DaVinci;  Laterality: N/A;    ENDOSCOPY N/A 11/30/2023    Procedure: ESOPHAGOGASTRODUODENOSCOPY with esophageal balloon dilation to 20mm;  Surgeon: Yue Jones MD;  Location: [facility] ENDOSCOPY;  Service: General;  Laterality: N/A;  stricture of gastric body    HIATAL HERNIA REPAIR N/A 12/6/2023    Procedure: LAPAROSCOPIC HIATAL HERNIA REPAIR; LIGAMENTUN TERES CARDIOPEXY WITH DAVINCI ROBOT;  Surgeon: Yue Jones MD;  Location: [facility] MAIN OR;  Service: Robotics - DaVinci;  Laterality: N/A;    TOENAIL EXCISION      01/2022      The following portions of the patient's history were reviewed and updated as appropriate: allergies, current medications, past medical history, past social history, past surgical history, and problem list.    Height 67.01 inches, weight 218 pounds, BMI 34.12    Physical Exam  Constitutional:       Appearance: Normal appearance. She is obese.   Cardiovascular:      Comments: Unable to assess due to video visit   Pulmonary:      Comments: Unable to assess due to video visit   Abdominal:      Comments: Unable to assess due to video visit    Neurological:      General: No focal deficit present.      Mental Status: She is alert and  oriented to person, place, and time.   Psychiatric:         Mood and Affect: Mood normal.         Behavior: Behavior normal.         Thought Content: Thought content normal.         Judgment: Judgment normal.             Assessment:   BMI 34.14, class 1 obesity, 1.5 year gastric sleeve, follow up dysphagia     Post-op, the patient is doing better. States her dysphagia has improved some. She is not having any nausea or vomiting. She is having gerd and had to go back on omeprazole. She also is having a lot of arthritis flare ups and was started on celebrex but stated noticing blood in her stool and stopped the medication. Denies vomiting blood or abdominal pain. Last colonoscopy 1 year ago. Pt is going to speak with her pcp about other medication options for arthritis that are not NSAIDS. She state she started taking activate supplements and these are helping with energy level as well as with weight loss. She has lost 6 pounds since last visit. Encourage 60+ grams of protein in her diet a day and 20-30 minutes of exercise 2-3 days a week if possible.  Will recheck labs including ferritin and iron profile as pt did have low iron sat in past and was taking oral iron. Ok to stop oral iron for now due to constipation but may need to restart pending labs. Plan to follow up in 6 months with labs before.     Plan:     Encouraged patient to be sure to get plenty of lean protein per day through small frequent meals all with a protein source.   Activity restrictions: none.   Recommended patient be sure to get at least 70 grams of protein per day by eating small, frequent meals all with high lean protein choices. Be sure to limit/cut back on daily carbohydrate intake. Discussed with the patient the recommended amount of water per day to intake- half of body weight in ounces. Reviewed vitamin requirements. Be sure to do routine exercise, 150 minutes per week minimum, including both cardio and strength training.     Instructions /  Recommendations: dietary counseling recommended, recommended a daily protein intake of  grams, vitamin supplement(s) recommended, recommended exercising at least 150 minutes per week, behavior modifications recommended and instructed to call the office for concerns, questions, or problems.     The patient was instructed to follow up in 6 months.     The patient was counseled regarding diet and exercise/ dysphagia/ iron. Total time spent face to face was 20 minutes and 15 minutes was spent counseling.     Oriana Mcmillan APRESTHER  [facility]ariat[facility]

## 2024-12-02 ENCOUNTER — TELEPHONE (OUTPATIENT)
Dept: FAMILY MEDICINE CLINIC | Facility: CLINIC | Age: 64
End: 2024-12-02
Payer: COMMERCIAL

## 2024-12-02 DIAGNOSIS — Z12.31 SCREENING MAMMOGRAM, ENCOUNTER FOR: Primary | ICD-10-CM

## 2024-12-18 ENCOUNTER — LAB (OUTPATIENT)
Dept: LAB | Facility: HOSPITAL | Age: 64
End: 2024-12-18
Payer: COMMERCIAL

## 2024-12-18 DIAGNOSIS — E66.811 OBESITY, CLASS I, BMI 30-34.9: ICD-10-CM

## 2024-12-18 DIAGNOSIS — Z90.3 H/O GASTRIC SLEEVE: ICD-10-CM

## 2024-12-18 LAB
25(OH)D3 SERPL-MCNC: 41.6 NG/ML (ref 30–100)
ALBUMIN SERPL-MCNC: 4.3 G/DL (ref 3.5–5.2)
ALBUMIN/GLOB SERPL: 1.7 G/DL
ALP SERPL-CCNC: 87 U/L (ref 39–117)
ALT SERPL W P-5'-P-CCNC: 16 U/L (ref 1–33)
ANION GAP SERPL CALCULATED.3IONS-SCNC: 7 MMOL/L (ref 5–15)
AST SERPL-CCNC: 16 U/L (ref 1–32)
BASOPHILS # BLD AUTO: 0.03 10*3/MM3 (ref 0–0.2)
BASOPHILS NFR BLD AUTO: 0.4 % (ref 0–1.5)
BILIRUB SERPL-MCNC: <0.2 MG/DL (ref 0–1.2)
BUN SERPL-MCNC: 10 MG/DL (ref 8–23)
BUN/CREAT SERPL: 12.7 (ref 7–25)
CALCIUM SPEC-SCNC: 9.4 MG/DL (ref 8.6–10.5)
CHLORIDE SERPL-SCNC: 106 MMOL/L (ref 98–107)
CO2 SERPL-SCNC: 27 MMOL/L (ref 22–29)
CREAT SERPL-MCNC: 0.79 MG/DL (ref 0.57–1)
DEPRECATED RDW RBC AUTO: 40.3 FL (ref 37–54)
EGFRCR SERPLBLD CKD-EPI 2021: 82.6 ML/MIN/1.73
EOSINOPHIL # BLD AUTO: 0.19 10*3/MM3 (ref 0–0.4)
EOSINOPHIL NFR BLD AUTO: 2.6 % (ref 0.3–6.2)
ERYTHROCYTE [DISTWIDTH] IN BLOOD BY AUTOMATED COUNT: 13.1 % (ref 12.3–15.4)
FERRITIN SERPL-MCNC: 18 NG/ML (ref 13–150)
FOLATE SERPL-MCNC: >20 NG/ML (ref 4.78–24.2)
GLOBULIN UR ELPH-MCNC: 2.5 GM/DL
GLUCOSE SERPL-MCNC: 108 MG/DL (ref 65–99)
HCT VFR BLD AUTO: 37.5 % (ref 34–46.6)
HGB BLD-MCNC: 12.6 G/DL (ref 12–15.9)
IMM GRANULOCYTES # BLD AUTO: 0.02 10*3/MM3 (ref 0–0.05)
IMM GRANULOCYTES NFR BLD AUTO: 0.3 % (ref 0–0.5)
IRON 24H UR-MRATE: 31 MCG/DL (ref 37–145)
IRON SATN MFR SERPL: 8 % (ref 20–50)
LYMPHOCYTES # BLD AUTO: 2.4 10*3/MM3 (ref 0.7–3.1)
LYMPHOCYTES NFR BLD AUTO: 32.4 % (ref 19.6–45.3)
MAGNESIUM SERPL-MCNC: 2.4 MG/DL (ref 1.6–2.4)
MCH RBC QN AUTO: 28.6 PG (ref 26.6–33)
MCHC RBC AUTO-ENTMCNC: 33.6 G/DL (ref 31.5–35.7)
MCV RBC AUTO: 85 FL (ref 79–97)
MONOCYTES # BLD AUTO: 0.63 10*3/MM3 (ref 0.1–0.9)
MONOCYTES NFR BLD AUTO: 8.5 % (ref 5–12)
NEUTROPHILS NFR BLD AUTO: 4.14 10*3/MM3 (ref 1.7–7)
NEUTROPHILS NFR BLD AUTO: 55.8 % (ref 42.7–76)
NRBC BLD AUTO-RTO: 0 /100 WBC (ref 0–0.2)
PHOSPHATE SERPL-MCNC: 2.1 MG/DL (ref 2.5–4.5)
PLATELET # BLD AUTO: 367 10*3/MM3 (ref 140–450)
PMV BLD AUTO: 10.8 FL (ref 6–12)
POTASSIUM SERPL-SCNC: 3.9 MMOL/L (ref 3.5–5.2)
PREALB SERPL-MCNC: 21.1 MG/DL (ref 20–40)
PROT SERPL-MCNC: 6.8 G/DL (ref 6–8.5)
PTH-INTACT SERPL-MCNC: 100 PG/ML (ref 15–65)
RBC # BLD AUTO: 4.41 10*6/MM3 (ref 3.77–5.28)
SODIUM SERPL-SCNC: 140 MMOL/L (ref 136–145)
TIBC SERPL-MCNC: 392 MCG/DL (ref 298–536)
TRANSFERRIN SERPL-MCNC: 263 MG/DL (ref 200–360)
WBC NRBC COR # BLD AUTO: 7.41 10*3/MM3 (ref 3.4–10.8)

## 2024-12-18 PROCEDURE — 84466 ASSAY OF TRANSFERRIN: CPT

## 2024-12-18 PROCEDURE — 80053 COMPREHEN METABOLIC PANEL: CPT

## 2024-12-18 PROCEDURE — 84134 ASSAY OF PREALBUMIN: CPT

## 2024-12-18 PROCEDURE — 83540 ASSAY OF IRON: CPT

## 2024-12-18 PROCEDURE — 84597 ASSAY OF VITAMIN K: CPT

## 2024-12-18 PROCEDURE — 83970 ASSAY OF PARATHORMONE: CPT

## 2024-12-18 PROCEDURE — 84630 ASSAY OF ZINC: CPT

## 2024-12-18 PROCEDURE — 82306 VITAMIN D 25 HYDROXY: CPT

## 2024-12-18 PROCEDURE — 82728 ASSAY OF FERRITIN: CPT

## 2024-12-18 PROCEDURE — 83921 ORGANIC ACID SINGLE QUANT: CPT

## 2024-12-18 PROCEDURE — 84590 ASSAY OF VITAMIN A: CPT

## 2024-12-18 PROCEDURE — 84446 ASSAY OF VITAMIN E: CPT

## 2024-12-18 PROCEDURE — 85025 COMPLETE CBC W/AUTO DIFF WBC: CPT

## 2024-12-18 PROCEDURE — 83735 ASSAY OF MAGNESIUM: CPT

## 2024-12-18 PROCEDURE — 82746 ASSAY OF FOLIC ACID SERUM: CPT

## 2024-12-18 PROCEDURE — 84100 ASSAY OF PHOSPHORUS: CPT

## 2024-12-18 PROCEDURE — 36415 COLL VENOUS BLD VENIPUNCTURE: CPT

## 2024-12-19 LAB — ZINC SERPL-MCNC: 69 UG/DL (ref 44–115)

## 2024-12-22 LAB
A-TOCOPHEROL VIT E SERPL-MCNC: 15.5 MG/L (ref 9–29)
GAMMA TOCOPHEROL SERPL-MCNC: 1.8 MG/L (ref 0.5–4.9)
METHYLMALONATE SERPL-SCNC: 199 NMOL/L (ref 0–378)

## 2024-12-23 DIAGNOSIS — Z90.3 H/O GASTRIC SLEEVE: Primary | ICD-10-CM

## 2024-12-23 DIAGNOSIS — D50.9 IRON DEFICIENCY ANEMIA, UNSPECIFIED IRON DEFICIENCY ANEMIA TYPE: ICD-10-CM

## 2024-12-23 PROBLEM — D50.8 IRON DEFICIENCY ANEMIA AFTER GASTRECTOMY: Status: ACTIVE | Noted: 2024-12-23

## 2024-12-23 PROBLEM — K91.89 IRON DEFICIENCY ANEMIA AFTER GASTRECTOMY: Status: ACTIVE | Noted: 2024-12-23

## 2024-12-23 RX ORDER — FAMOTIDINE 10 MG/ML
20 INJECTION, SOLUTION INTRAVENOUS AS NEEDED
OUTPATIENT
Start: 2024-12-23

## 2024-12-23 RX ORDER — SODIUM CHLORIDE 9 MG/ML
20 INJECTION, SOLUTION INTRAVENOUS ONCE
OUTPATIENT
Start: 2024-12-23

## 2024-12-23 RX ORDER — HYDROCORTISONE SODIUM SUCCINATE 100 MG/2ML
50 INJECTION INTRAMUSCULAR; INTRAVENOUS AS NEEDED
OUTPATIENT
Start: 2024-12-23

## 2024-12-24 LAB — PHYTONADIONE SERPL-MCNC: 1.36 NG/ML (ref 0.1–2.2)

## 2024-12-29 LAB — VIT A SERPL-MCNC: 55 UG/DL (ref 22–69.5)

## 2024-12-30 ENCOUNTER — HOSPITAL ENCOUNTER (OUTPATIENT)
Dept: MAMMOGRAPHY | Facility: HOSPITAL | Age: 64
Discharge: HOME | End: 2024-12-30
Admitting: FAMILY MEDICINE
Payer: COMMERCIAL

## 2024-12-30 DIAGNOSIS — Z12.31 SCREENING MAMMOGRAM, ENCOUNTER FOR: ICD-10-CM

## 2024-12-30 PROCEDURE — 77067 SCR MAMMO BI INCL CAD: CPT

## 2024-12-30 PROCEDURE — 77063 BREAST TOMOSYNTHESIS BI: CPT

## 2025-01-31 NOTE — PROGRESS NOTES
Subjective   Tirso Palomo is a 64 y.o. female. Presents to [facility]    Chief Complaint   Patient presents with    Fatigue    Hypothyroidism       History of Present Illness  Patient is here to follow up on labs.   Fatigue  Symptoms are chronic.   Onset was 1 to 6 months.   Symptoms include fatigue.    Pertinent negative symptoms include no abdominal pain, no joint pain, no chest pain, no cough, no nausea and no weakness.   Hypothyroidism  Presents for follow-up visit. Symptoms include fatigue. Patient reports no depressed mood, diarrhea, nail problem or palpitations. Past treatments include levothyroxine.      She is low in iron and her PTH is elevated.     I personally reviewed and updated the patient's allergies, medications, problem list, and past medical, surgical, social, and family history. I have reviewed and confirmed the accuracy of the History of Present Illness and Review of Symptoms as documented by the MA/LPN/RN. Miryam Martinez MD    Allergies:  Allergies   Allergen Reactions    Benadryl [Diphenhydramine] Other (See Comments)     Makes her too sleepy    Lisinopril Cough    Topamax [Topiramate] GI Intolerance    Actigall [Ursodiol] Other (See Comments)     Burning in lining of stomach     Amlodipine Palpitations       Social History:  Social History     Socioeconomic History    Marital status:    Tobacco Use    Smoking status: Former     Current packs/day: 0.00     Average packs/day: 0.3 packs/day for 30.0 years (7.5 ttl pk-yrs)     Types: Cigarettes     Start date: 1990     Quit date: 2010     Years since quittin.5     Passive exposure: Past    Smokeless tobacco: Never    Tobacco comments:     Quit cold turkey   Vaping Use    Vaping status: Never Used   Substance and Sexual Activity    Alcohol use: No    Drug use: No    Sexual activity: Yes     Partners: Male     Birth control/protection: Post-menopausal       Family History:  Family History   Problem  Relation Age of Onset    Breast cancer Mother     Cancer Mother         breast    Hypertension Father     Heart attack Father     Cancer Father         prostate    Obesity Brother     Hypertension Brother     Arthritis Brother     Heart disease Maternal Grandmother     Diabetes Maternal Grandmother     Cancer Paternal Grandmother     Breast cancer Maternal Aunt     Obesity Maternal Grandfather     Stroke Maternal Grandfather     Hypertension Maternal Grandfather     Stroke Paternal Grandfather     Heart attack Paternal Grandfather     Hypertension Paternal Grandfather        Past Medical History :  Patient Active Problem List   Diagnosis    Chronic fatigue    Essential hypertension    Snoring    Hemorrhoids    Multiple joint pain    Acquired hypothyroidism    Screening mammogram, encounter for    Screening for cervical cancer    Screening for colon cancer    Encounter for general adult medical examination with abnormal findings    Vitamin D deficiency    Urinary incontinence    Stress incontinence of urine    Spondylosis of lumbosacral spine without myelopathy    Class 2 severe obesity due to excess calories with serious comorbidity and body mass index (BMI) of 35.0 to 35.9 in adult    Menopause present    Hypermobility of urethra    History of colonic polyps    Female bladder prolapse    Abscess of right breast    Normal pelvic exam    Epidermal inclusion cyst    Status post sleeve gastrectomy    Chronic idiopathic constipation    History of tobacco use    Vaginal dryness    Alopecia    Change in bowel habit    Family history of colon cancer    H/O bariatric surgery    Hiatal hernia with GERD    Influenza vaccine needed    Mixed hyperlipidemia    Numerous moles    Cervical radiculopathy    Lumbar radiculopathy    Spasm of muscle of lower back    Skin lesion of chest wall    Left hip pain    Iliotibial band syndrome of left side    Chronic arthritis    Iron deficiency anemia after gastrectomy    H/O gastric sleeve     Secondary hyperparathyroidism       Medication List:    Current Outpatient Medications:     acetaminophen (TYLENOL) 500 MG tablet, Take 2 tablets by mouth Every 6 (Six) Hours As Needed (pain)., Disp: 60 tablet, Rfl: 0    celecoxib (CeleBREX) 200 MG capsule, Take 1 capsule by mouth Daily., Disp: 30 capsule, Rfl: 5    clobetasol (TEMOVATE) 0.05 % external solution, APPLY 4 DROPS ON THE SCALP AND GENTLY MASSAGE IN WITH CLEAN FINGERTIPS., Disp: , Rfl:     famotidine (PEPCID) 10 MG tablet, Take 1 tablet by mouth 2 (Two) Times a Day., Disp: , Rfl:     ferrous sulfate 325 (65 FE) MG tablet, TAKE 1 TABLET BY MOUTH EVERY DAY WITH BREAKFAST, Disp: 90 tablet, Rfl: 1    levothyroxine (SYNTHROID, LEVOTHROID) 75 MCG tablet, TAKE 1 TABLET BY MOUTH EVERY DAY, Disp: 90 tablet, Rfl: 1    losartan (COZAAR) 25 MG tablet, Take 1 tablet by mouth Daily., Disp: 90 tablet, Rfl: 3    Minoxidil 5 % foam, Apply 1 application topically As Needed., Disp: , Rfl:     multivitamin with minerals tablet tablet, Take 1 tablet by mouth Daily. Bariatric, Disp: , Rfl:     NON FORMULARY, 4 (Four) Times a Day. Weem, Disp: , Rfl:     omeprazole (priLOSEC) 40 MG capsule, Take 1 capsule by mouth Daily., Disp: 30 capsule, Rfl: 6    polyethylene glycol (MiraLax) 17 GM/SCOOP powder, mix 1 capful (17 grams) in liquid and drink by mouth daily (Patient taking differently: 17 g Daily. Pt taking on occation), Disp: 510 g, Rfl: 1    Xiidra 5 % ophthalmic solution, instill 1 drop into both eyes twice a day, Disp: , Rfl:     Past Surgical History:  Past Surgical History:   Procedure Laterality Date    BREAST SURGERY Right 2012    biopsy    COLONOSCOPY  2018 2018 2023    ENDOSCOPY N/A 02/09/2023    Procedure: ESOPHAGOGASTRODUODENOSCOPY with gastric biopsy;  Surgeon: Yue Jones MD;  Location: [facility] ENDOSCOPY;  Service: General;  Laterality: N/A;  normal    ENDOSCOPY N/A 11/30/2023    Procedure: ESOPHAGOGASTRODUODENOSCOPY with esophageal balloon dilation to  "20mm;  Surgeon: Yue Jones MD;  Location: [facility] ENDOSCOPY;  Service: General;  Laterality: N/A;  stricture of gastric body    GASTRIC SLEEVE LAPAROSCOPIC N/A 03/16/2023    Procedure: GASTRIC SLEEVE LAPAROSCOPIC WITH DAVINCI ROBOT; HIATAL HERNIA REPAIR;  Surgeon: Yue Jones MD;  Location: [facility] MAIN OR;  Service: Robotics - DaVinci;  Laterality: N/A;    HIATAL HERNIA REPAIR N/A 12/6/2023    Procedure: LAPAROSCOPIC HIATAL HERNIA REPAIR; LIGAMENTUN TERES CARDIOPEXY WITH DAVINCI ROBOT;  Surgeon: Yue Jones MD;  Location: [facility] MAIN OR;  Service: Robotics - DaVinci;  Laterality: N/A;    TOENAIL EXCISION      01/2022    TUBAL ABDOMINAL LIGATION  1993         Physical Exam:      Vital Signs:    Vitals:    02/03/25 1441   BP: 130/90   Pulse: 91   Resp: 19   Temp: 97.1 °F (36.2 °C)   SpO2: 99%        /90 (BP Location: Right arm, Patient Position: Sitting, Cuff Size: Adult)   Pulse 91   Temp 97.1 °F (36.2 °C) (Temporal)   Resp 19   Ht 170.2 cm (67.01\")   Wt 102 kg (225 lb)   LMP  (LMP Unknown)   SpO2 99% Comment: ra  BMI 35.23 kg/m²     Wt Readings from Last 3 Encounters:   02/03/25 102 kg (225 lb)   11/22/24 98.9 kg (218 lb)   09/12/24 102 kg (225 lb 6.4 oz)       Result Review :   The following data was reviewed by: Miryam Martinez MD on 02/03/2025:              Latest Reference Range & Units 12/18/24 16:49   Sodium 136 - 145 mmol/L 140   Potassium 3.5 - 5.2 mmol/L 3.9   Chloride 98 - 107 mmol/L 106   CO2 22.0 - 29.0 mmol/L 27.0   Anion Gap 5.0 - 15.0 mmol/L 7.0   BUN 8 - 23 mg/dL 10   Creatinine 0.57 - 1.00 mg/dL 0.79   BUN/Creatinine Ratio 7.0 - 25.0  12.7   eGFR >60.0 mL/min/1.73 82.6   Glucose 65 - 99 mg/dL 108 (H)   Calcium 8.6 - 10.5 mg/dL 9.4   Magnesium 1.6 - 2.4 mg/dL 2.4   Phosphorus 2.5 - 4.5 mg/dL 2.1 (L)   Alkaline Phosphatase 39 - 117 U/L 87   Total Protein 6.0 - 8.5 g/dL 6.8   Albumin 3.5 - 5.2 g/dL 4.3   Globulin gm/dL 2.5   A/G Ratio g/dL 1.7   AST (SGOT) 1 - 32 U/L 16   ALT (SGPT) 1 " - 33 U/L 16   Total Bilirubin 0.0 - 1.2 mg/dL <0.2   PTH Intact (Serial Monitor) 15.0 - 65.0 pg/mL 100.0 (H)   Iron 37 - 145 mcg/dL 31 (L)   Ferritin 13.00 - 150.00 ng/mL 18.00   Iron Saturation (TSAT) 20 - 50 % 8 (L)   Transferrin 200 - 360 mg/dL 263   TIBC 298 - 536 mcg/dL 392   Folate 4.78 - 24.20 ng/mL >20.00   Methylmalonic Acid 0 - 378 nmol/L 199   Vitamin A 22.0 - 69.5 ug/dL 55.0   25 Hydroxy, Vitamin D 30.0 - 100.0 ng/ml 41.6   Zinc 44 - 115 ug/dL 69   Prealbumin 20.0 - 40.0 mg/dL 21.1   Vitamin K 0.10 - 2.20 ng/mL 1.36   WBC 3.40 - 10.80 10*3/mm3 7.41   RBC 3.77 - 5.28 10*6/mm3 4.41   Hemoglobin 12.0 - 15.9 g/dL 12.6   Hematocrit 34.0 - 46.6 % 37.5   Platelets 140 - 450 10*3/mm3 367   RDW 12.3 - 15.4 % 13.1   MCV 79.0 - 97.0 fL 85.0   MCH 26.6 - 33.0 pg 28.6   MCHC 31.5 - 35.7 g/dL 33.6   MPV 6.0 - 12.0 fL 10.8   RDW-SD 37.0 - 54.0 fl 40.3   Neutrophil Rel % 42.7 - 76.0 % 55.8   Lymphocyte Rel % 19.6 - 45.3 % 32.4   Monocyte Rel % 5.0 - 12.0 % 8.5   Eosinophil Rel % 0.3 - 6.2 % 2.6   Basophil Rel % 0.0 - 1.5 % 0.4   Immature Granulocyte Rel % 0.0 - 0.5 % 0.3   Neutrophils Absolute 1.70 - 7.00 10*3/mm3 4.14   Lymphocytes Absolute 0.70 - 3.10 10*3/mm3 2.40   Monocytes Absolute 0.10 - 0.90 10*3/mm3 0.63   Eosinophils Absolute 0.00 - 0.40 10*3/mm3 0.19   Basophils Absolute 0.00 - 0.20 10*3/mm3 0.03   Immature Grans, Absolute 0.00 - 0.05 10*3/mm3 0.02   nRBC 0.0 - 0.2 /100 WBC 0.0   (H): Data is abnormally high  (L): Data is abnormally low      Physical Exam  Vitals reviewed.   Constitutional:       Appearance: Normal appearance. She is well-developed.   HENT:      Head: Normocephalic and atraumatic.   Eyes:      General:         Right eye: No discharge.         Left eye: No discharge.   Cardiovascular:      Rate and Rhythm: Normal rate and regular rhythm.      Heart sounds: Normal heart sounds. No murmur heard.     No friction rub. No gallop.   Pulmonary:      Effort: Pulmonary effort is normal. No respiratory  distress.      Breath sounds: Normal breath sounds. No wheezing or rales.   Skin:     General: Skin is warm and dry.      Findings: No rash.   Neurological:      Mental Status: She is alert and oriented to person, place, and time.      Coordination: Coordination normal.      Gait: Gait normal.   Psychiatric:         Behavior: Behavior is cooperative.         Assessment and Plan:  Problems Addressed this Visit          Endocrine and Metabolic    Acquired hypothyroidism    Class 2 severe obesity due to excess calories with serious comorbidity and body mass index (BMI) of 35.0 to 35.9 in adult     Patient's (Body mass index is 35.29 kg/m².) indicates that they are morbidly/severely obese (BMI > 40 or > 35 with obesity - related health condition) with health conditions that include hypertension . Weight is unchanged. BMI  is above average; BMI management plan is completed. We discussed low calorie, low carb based diet program, portion control, and increasing exercise.          H/O bariatric surgery    Secondary hyperparathyroidism     From gastric sleeve    Will have her see dietician to help her with diet and calcium/vitamin D intake         Relevant Orders    Ambulatory Referral to Nutrition Services       Tobacco    History of tobacco use       Other    Iron deficiency anemia after gastrectomy     Recheck labs         Relevant Orders    Ambulatory Referral to Nutrition Services    CBC & Differential (Completed)    Ferritin (Completed)    Iron Profile (Completed)    H/O gastric sleeve    Relevant Orders    Ambulatory Referral to Nutrition Services     Other Visit Diagnoses       Other fatigue    -  Primary    check labs          Diagnoses         Codes Comments    Other fatigue    -  Primary ICD-10-CM: R53.83  ICD-9-CM: 780.79 check labs    Acquired hypothyroidism     ICD-10-CM: E03.9  ICD-9-CM: 244.9     Class 2 severe obesity due to excess calories with serious comorbidity and body mass index (BMI) of 35.0 to 35.9  in adult     ICD-10-CM: E66.812, E66.01, Z68.35  ICD-9-CM: 278.01, V85.35     History of tobacco use     ICD-10-CM: Z87.891  ICD-9-CM: V15.82     Iron deficiency anemia after gastrectomy     ICD-10-CM: K91.89, D50.8  ICD-9-CM: 997.49, 280.8     Secondary hyperparathyroidism     ICD-10-CM: N25.81  ICD-9-CM: 588.81     H/O gastric sleeve     ICD-10-CM: Z90.3  ICD-9-CM: V15.29     H/O bariatric surgery     ICD-10-CM: Z98.84  ICD-9-CM: V45.86                          An After Visit Summary and PPPS were given to the patient.       This document is intended for medical expert use only. Reading of this document by patients and/or patient's family without participating medical staff guidance may result in misinterpretation and unintended morbidity. Any interpretation of such data is the responsibility of the patient and/or family member responsible for the patient in concert with their primary or specialist providers, not to be left for sources of online searches such as [employer], [employer] or similar queries. Relying on these approaches to knowledge may result in misinterpretation, misguided goals of care and even death should patients or family members try recommendations outside of the realm of professional medical care.

## 2025-02-03 ENCOUNTER — OFFICE VISIT (OUTPATIENT)
Dept: FAMILY MEDICINE CLINIC | Facility: CLINIC | Age: 65
End: 2025-02-03
Payer: COMMERCIAL

## 2025-02-03 VITALS
BODY MASS INDEX: 35.31 KG/M2 | RESPIRATION RATE: 19 BRPM | HEIGHT: 67 IN | WEIGHT: 225 LBS | DIASTOLIC BLOOD PRESSURE: 90 MMHG | HEART RATE: 91 BPM | SYSTOLIC BLOOD PRESSURE: 130 MMHG | OXYGEN SATURATION: 99 % | TEMPERATURE: 97.1 F

## 2025-02-03 DIAGNOSIS — Z98.84 H/O BARIATRIC SURGERY: ICD-10-CM

## 2025-02-03 DIAGNOSIS — E03.9 ACQUIRED HYPOTHYROIDISM: ICD-10-CM

## 2025-02-03 DIAGNOSIS — E66.01 CLASS 2 SEVERE OBESITY DUE TO EXCESS CALORIES WITH SERIOUS COMORBIDITY AND BODY MASS INDEX (BMI) OF 35.0 TO 35.9 IN ADULT: ICD-10-CM

## 2025-02-03 DIAGNOSIS — N25.81 SECONDARY HYPERPARATHYROIDISM: ICD-10-CM

## 2025-02-03 DIAGNOSIS — D50.8 IRON DEFICIENCY ANEMIA AFTER GASTRECTOMY: ICD-10-CM

## 2025-02-03 DIAGNOSIS — K91.89 IRON DEFICIENCY ANEMIA AFTER GASTRECTOMY: ICD-10-CM

## 2025-02-03 DIAGNOSIS — Z87.891 HISTORY OF TOBACCO USE: ICD-10-CM

## 2025-02-03 DIAGNOSIS — R53.83 OTHER FATIGUE: Primary | ICD-10-CM

## 2025-02-03 DIAGNOSIS — E66.812 CLASS 2 SEVERE OBESITY DUE TO EXCESS CALORIES WITH SERIOUS COMORBIDITY AND BODY MASS INDEX (BMI) OF 35.0 TO 35.9 IN ADULT: ICD-10-CM

## 2025-02-03 DIAGNOSIS — Z90.3 H/O GASTRIC SLEEVE: ICD-10-CM

## 2025-02-04 LAB
BASOPHILS # BLD AUTO: 0 X10E3/UL (ref 0–0.2)
BASOPHILS NFR BLD AUTO: 0 %
EOSINOPHIL # BLD AUTO: 0.1 X10E3/UL (ref 0–0.4)
EOSINOPHIL NFR BLD AUTO: 1 %
ERYTHROCYTE [DISTWIDTH] IN BLOOD BY AUTOMATED COUNT: 14 % (ref 11.7–15.4)
FERRITIN SERPL-MCNC: 31 NG/ML (ref 15–150)
HCT VFR BLD AUTO: 42.5 % (ref 34–46.6)
HGB BLD-MCNC: 13.5 G/DL (ref 11.1–15.9)
IMM GRANULOCYTES # BLD AUTO: 0 X10E3/UL (ref 0–0.1)
IMM GRANULOCYTES NFR BLD AUTO: 0 %
IRON SATN MFR SERPL: 20 % (ref 15–55)
IRON SERPL-MCNC: 69 UG/DL (ref 27–139)
LYMPHOCYTES # BLD AUTO: 2.7 X10E3/UL (ref 0.7–3.1)
LYMPHOCYTES NFR BLD AUTO: 34 %
MCH RBC QN AUTO: 27.8 PG (ref 26.6–33)
MCHC RBC AUTO-ENTMCNC: 31.8 G/DL (ref 31.5–35.7)
MCV RBC AUTO: 87 FL (ref 79–97)
MONOCYTES # BLD AUTO: 0.5 X10E3/UL (ref 0.1–0.9)
MONOCYTES NFR BLD AUTO: 7 %
NEUTROPHILS # BLD AUTO: 4.6 X10E3/UL (ref 1.4–7)
NEUTROPHILS NFR BLD AUTO: 58 %
PLATELET # BLD AUTO: 322 X10E3/UL (ref 150–450)
RBC # BLD AUTO: 4.86 X10E6/UL (ref 3.77–5.28)
TIBC SERPL-MCNC: 342 UG/DL (ref 250–450)
UIBC SERPL-MCNC: 273 UG/DL (ref 118–369)
WBC # BLD AUTO: 7.9 X10E3/UL (ref 3.4–10.8)

## 2025-02-10 NOTE — ASSESSMENT & PLAN NOTE
Patient's (Body mass index is 35.29 kg/m².) indicates that they are morbidly/severely obese (BMI > 40 or > 35 with obesity - related health condition) with health conditions that include hypertension . Weight is unchanged. BMI  is above average; BMI management plan is completed. We discussed low calorie, low carb based diet program, portion control, and increasing exercise.

## 2025-02-10 NOTE — ASSESSMENT & PLAN NOTE
From gastric sleeve    Will have her see dietician to help her with diet and calcium/vitamin D intake

## 2025-03-03 NOTE — PROGRESS NOTES
Subsequent Medicare Wellness Visit    Subjective    History of Present Illness:  Tirso Palomo is a 64 y.o. female who presents for a Subsequent Medicare Wellness Visit.    The following portions of the patient's history were reviewed and   updated as appropriate: allergies, current medications, past family history, past medical history, past social history, past surgical history, and problem list.  Family History   Problem Relation Age of Onset    Breast cancer Mother     Cancer Mother         breast    Hypertension Father     Heart attack Father     Cancer Father         prostate    Obesity Brother     Hypertension Brother     Arthritis Brother     Heart disease Maternal Grandmother     Diabetes Maternal Grandmother     Cancer Paternal Grandmother     Breast cancer Maternal Aunt     Obesity Maternal Grandfather     Stroke Maternal Grandfather     Hypertension Maternal Grandfather     Stroke Paternal Grandfather     Heart attack Paternal Grandfather     Hypertension Paternal Grandfather      Past Surgical History:   Procedure Laterality Date    BREAST SURGERY Right 2012    biopsy    COLONOSCOPY  2018 2018 2023    ENDOSCOPY N/A 02/09/2023    Procedure: ESOPHAGOGASTRODUODENOSCOPY with gastric biopsy;  Surgeon: Yue Jones MD;  Location: [facility] ENDOSCOPY;  Service: General;  Laterality: N/A;  normal    ENDOSCOPY N/A 11/30/2023    Procedure: ESOPHAGOGASTRODUODENOSCOPY with esophageal balloon dilation to 20mm;  Surgeon: Yue Jones MD;  Location: [facility] ENDOSCOPY;  Service: General;  Laterality: N/A;  stricture of gastric body    GASTRIC SLEEVE LAPAROSCOPIC N/A 03/16/2023    Procedure: GASTRIC SLEEVE LAPAROSCOPIC WITH DAVINCI ROBOT; HIATAL HERNIA REPAIR;  Surgeon: Yue Jones MD;  Location: [facility] MAIN OR;  Service: Robotics - DaVinci;  Laterality: N/A;    HIATAL HERNIA REPAIR N/A 12/6/2023    Procedure: LAPAROSCOPIC HIATAL HERNIA REPAIR; LIGAMENTUN TERES CARDIOPEXY WITH DAVINCI ROBOT;  Surgeon: Yue Jones  MD;  Location: [facility] MAIN OR;  Service: Robotics - DaVinci;  Laterality: N/A;    TOENAIL EXCISION      01/2022    TUBAL ABDOMINAL LIGATION  1993        Compared to one year ago, the patient feels her physical   health is the same.    Compared to one year ago, the patient feels her mental   health is the same.    Recent Hospitalizations:  She was not admitted to the hospital during the last year.       Current Medical Providers:  Patient Care Team:  Miryam Martinez MD as PCP - General (Family Medicine)  Yue Jones MD as Consulting Physician (Bariatrics)    Outpatient Medications Prior to Visit   Medication Sig Dispense Refill    acetaminophen (TYLENOL) 500 MG tablet Take 2 tablets by mouth Every 6 (Six) Hours As Needed (pain). 60 tablet 0    clobetasol (TEMOVATE) 0.05 % external solution APPLY 4 DROPS ON THE SCALP AND GENTLY MASSAGE IN WITH CLEAN FINGERTIPS.      famotidine (PEPCID) 10 MG tablet Take 1 tablet by mouth 2 (Two) Times a Day.      ferrous sulfate 325 (65 FE) MG tablet TAKE 1 TABLET BY MOUTH EVERY DAY WITH BREAKFAST 90 tablet 1    Minoxidil 5 % foam Apply 1 application topically As Needed.      multivitamin with minerals tablet tablet Take 1 tablet by mouth Daily. Bariatric      NON FORMULARY 4 (Four) Times a Day. Weem      omeprazole (priLOSEC) 40 MG capsule Take 1 capsule by mouth Daily. 30 capsule 6    polyethylene glycol (MiraLax) 17 GM/SCOOP powder mix 1 capful (17 grams) in liquid and drink by mouth daily (Patient taking differently: 17 g Daily. Pt taking on occation) 510 g 1    Xiidra 5 % ophthalmic solution instill 1 drop into both eyes twice a day      celecoxib (CeleBREX) 200 MG capsule Take 1 capsule by mouth Daily. 30 capsule 5    levothyroxine (SYNTHROID, LEVOTHROID) 75 MCG tablet TAKE 1 TABLET BY MOUTH EVERY DAY 90 tablet 1    losartan (COZAAR) 25 MG tablet Take 1 tablet by mouth Daily. 90 tablet 3     No facility-administered medications prior to visit.     Pain Score    03/04/25 1203    PainSc: 0-No pain      No opioid medication identified on active medication list. I have reviewed chart for other potential  high risk medication/s and harmful drug interactions in the elderly.        Aspirin is not on active medication list.  Aspirin use is not indicated based on review of current medical condition/s. Risk of harm outweighs potential benefits.  .    Patient Active Problem List   Diagnosis    Chronic fatigue    Essential hypertension    Snoring    Hemorrhoids    Multiple joint pain    Acquired hypothyroidism    Screening mammogram, encounter for    Screening for cervical cancer    Screening for colon cancer    Encounter for general adult medical examination with abnormal findings    Vitamin D deficiency    Urinary incontinence    Stress incontinence of urine    Spondylosis of lumbosacral spine without myelopathy    Class 2 severe obesity due to excess calories with serious comorbidity and body mass index (BMI) of 35.0 to 35.9 in adult    Menopause present    Hypermobility of urethra    History of colonic polyps    Female bladder prolapse    Abscess of right breast    Normal pelvic exam    Epidermal inclusion cyst    Status post sleeve gastrectomy    Chronic idiopathic constipation    History of tobacco use    Vaginal dryness    Alopecia    Change in bowel habit    Family history of colon cancer    H/O bariatric surgery    Hiatal hernia with GERD    Influenza vaccine needed    Mixed hyperlipidemia    Numerous moles    Cervical radiculopathy    Lumbar radiculopathy    Spasm of muscle of lower back    Skin lesion of chest wall    Left hip pain    Iliotibial band syndrome of left side    Chronic arthritis    Iron deficiency anemia after gastrectomy    H/O gastric sleeve    Secondary hyperparathyroidism    Family history of cancer     Advance Care Planning  Advance Directive is not on file.  ACP discussion was held with the patient during this visit. Patient does not have an advance directive,  "information provided.    Discussion with Patientregarding advanced directives. Living Will, POST, and Advanced Care Planning form discussed at length and reviewed with patient. I spent over 16 minutes  with patient reviewing information and documenting  in the chart.  Reviewed medical interventions with patient and the differences between each: Comfort, Limited and Full.  Discussed the use of antibiotics at the end of life.  Discussed artificially administered nutrition, patient is aware that if she is alert and oriented they can change their mind at any time. . . Advised to discuss with healthcare representative if they can comply with wishes. Patient encouraged to have a meeting to discuss his decision regarding advanced care directives and goals of care with extended family and significant  friends  In regard to the POST form:The patient was able to make decisions during the video visit today, she will take home and return. and advised to give to family members, place in an easily accessible place and take with them if going to the hospital or Emergency room.          Objective    Vitals:    25 1203 25 1245   BP: (!) 190/100 176/94   BP Location: Right arm Left arm   Patient Position: Sitting Lying   Cuff Size: Adult Large Adult   Pulse: 89    Resp: 18    Temp: 97.8 °F (36.6 °C)    TempSrc: Temporal    SpO2: 99%  Comment: ra    Weight: 102 kg (224 lb 9.6 oz)    Height: 170.2 cm (67.01\")    PainSc: 0-No pain        Does the patient have evidence of cognitive impairment? No           HEALTH RISK ASSESSMENT    Smoking Status:  Social History     Tobacco Use   Smoking Status Former    Current packs/day: 0.00    Average packs/day: 0.3 packs/day for 30.0 years (7.5 ttl pk-yrs)    Types: Cigarettes    Start date: 1990    Quit date: 2010    Years since quittin.6    Passive exposure: Past   Smokeless Tobacco Never   Tobacco Comments    Quit cold turkey     Alcohol Consumption:  Social History "     Substance and Sexual Activity   Alcohol Use No     Fall Risk Screen:    JUWANADI Fall Risk Assessment was completed, and patient is at LOW risk for falls.Assessment completed on:3/4/2025    Depression Screening:      3/4/2025    12:00 PM   PHQ-2/PHQ-9 Depression Screening   Little interest or pleasure in doing things Not at all   Feeling down, depressed, or hopeless Not at all   How difficult have these problems made it for you to do your work, take care of things at home, or get along with other people? Not difficult at all       Health Habits and Functional and Cognitive Screening:      3/4/2025    11:04 AM   Functional & Cognitive Status   Do you have difficulty preparing food and eating? No   Do you have difficulty bathing yourself, getting dressed or grooming yourself? No   Do you have difficulty using the toilet? No   Do you have difficulty moving around from place to place? No   Do you have trouble with steps or getting out of a bed or a chair? No   Current Diet Limited Junk Food   Dental Exam Up to date   Eye Exam Up to date   Exercise (times per week) 3 times per week   Current Exercises Include Cardiovascular Workout;Gardening;Hiking;Home Exercise Program (TV, Computer, Etc.);Walking;Yard Work   Do you need help using the phone?  No   Are you deaf or do you have serious difficulty hearing?  No   Do you need help to go to places out of walking distance? No   Do you need help shopping? No   Do you need help preparing meals?  No   Do you need help with housework?  No   Do you need help with laundry? No   Do you need help taking your medications? No   Do you need help managing money? No   Do you ever drive or ride in a car without wearing a seat belt? No   Have you felt unusual stress, anger or loneliness in the last month? No   Who do you live with? Spouse   If you need help, do you have trouble finding someone available to you? No   Have you been bothered in the last four weeks by sexual problems? No   Do  you have difficulty concentrating, remembering or making decisions? No       Age-appropriate Screening Schedule:  Refer to the list below for future screening recommendations based on patient's age, sex and/or medical conditions. Orders for these recommended tests are listed in the plan section. The patient has been provided with a written plan.    Health Maintenance   Topic Date Due    TDAP/TD VACCINES (1 - Tdap) Never done    Pneumococcal Vaccine 50+ (1 of 1 - PCV) Never done    ZOSTER VACCINE (1 of 2) Never done    HEPATITIS C SCREENING  Never done    COVID-19 Vaccine ( - - season) 2024    INFLUENZA VACCINE  2025 (Originally 2024)    PAP SMEAR  2025    ANNUAL WELLNESS VISIT  2026    LIPID PANEL  2026    BMI FOLLOWUP  2026    DXA SCAN  2026    MAMMOGRAM  2026    COLORECTAL CANCER SCREENING  2030              Assessment & Plan   Medically necessary, significant, and separately identifiable medical problems identified during this visit are addressed on a separate visit note.    CMS Preventative Services Quick Reference  Risk Factors Identified During Encounter  None Identified  The above risks/problems have been discussed with the patient.  Follow up actions/plans if indicated are seen below in the Assessment/Plan Section.  Pertinent information has been shared with the patient in the After Visit Summary.    Follow Up:   No follow-ups on file.     An After Visit Summary and PPPS were made available to the patient.    Medicare Wellness  Personal Prevention Plan of Service     Date of Office Visit:  2025  Encounter Provider:  Miryam Martinez MD  Place of Service:  [facility]  Patient Name: Tirso Palomo  :  1960    As part of the Medicare Wellness portion of your visit today, we are providing you with this personalized preventive plan of services (PPPS). This plan is based upon recommendations of the  [place] Preventive Services Task Force (USPSTF) and the Advisory Committee on Immunization Practices (ACIP).    This lists the preventive care services that should be considered, and provides dates of when you are due. Items listed as completed are up-to-date and do not require any further intervention.    Health Maintenance   Topic Date Due    TDAP/TD VACCINES (1 - Tdap) Never done    Pneumococcal Vaccine 50+ (1 of 1 - PCV) Never done    ZOSTER VACCINE (1 of 2) Never done    HEPATITIS C SCREENING  Never done    COVID-19 Vaccine (5 - 2024-25 season) 09/01/2024    INFLUENZA VACCINE  03/31/2025 (Originally 7/1/2024)    PAP SMEAR  07/22/2025    ANNUAL WELLNESS VISIT  03/04/2026    LIPID PANEL  03/04/2026    BMI FOLLOWUP  03/04/2026    DXA SCAN  05/23/2026    MAMMOGRAM  12/30/2026    COLORECTAL CANCER SCREENING  12/06/2030       Orders Placed This Encounter   Procedures    Mammo Screening Digital Tomosynthesis Bilateral With CAD     Standing Status:   Future     Expected Date:   3/9/2025     Expiration Date:   3/4/2026     Reason for Exam::   screening     Release to patient:   Routine Release [[id number]]    CT Cardiac Calcium Score Without Dye     Standing Status:   Future     Expected Date:   3/9/2025     Expiration Date:   3/4/2026     Release to patient:   Routine Release [[id number]]     Reason for Exam::   screen    Comprehensive Metabolic Panel     Release to patient:   Routine Release [[id number]]     [facility] Has the patient fasted?:   Yes    TSH     Release to patient:   Routine Release [[id number]]     [facility] Has the patient fasted?:   Yes    Lipid Panel With / Chol / HDL Ratio     Release to patient:   Routine Release [[id number]]     [facility] Has the patient fasted?:   Yes    Ambulatory Referral to Endocrinology     Referral Priority:   Routine     Referral Type:   Consultation     Referral Reason:   Patient Preference     Referred to Provider:   Guillermina Cabello RD     Requested Specialty:    Dietitian     Number of Visits Requested:   1    Ambulatory Referral to Hematology / Oncology     Referral Priority:   Routine     Referral Type:   Consultation     Referred to Provider:   Stella Ballard MD     Requested Specialty:   Hematology and Oncology     Number of Visits Requested:   1    POCT urinalysis dipstick, manual     Release to patient:   Routine Release [[id number]]       No follow-ups on file.  Sit-to-Stand Exercise    The sit-to-stand exercise (also known as the chair stand or chair rise exercise) strengthens your lower body and helps you maintain or improve your mobility and independence. The goal is to do the sit-to-stand exercise without using your hands. This will be easier as you become stronger. You should always talk with your health care provider before starting any exercise program, especially if you have had recent surgery.  Do the exercise exactly as told by your health care provider and adjust it as directed. It is normal to feel mild stretching, pulling, tightness, or discomfort as you do this exercise, but you should stop right away if you feel sudden pain or your pain gets worse. Do not begin doing this exercise until told by your health care provider.  What the sit-to-stand exercise does  The sit-to-stand exercise helps to strengthen the muscles in your thighs and the muscles in the center of your body that give you stability (core muscles). This exercise is especially helpful if:  You have had knee or hip surgery.  You have trouble getting up from a chair, out of a car, or off the toilet.  How to do the sit-to-stand exercise  Sit toward the front edge of a sturdy chair without armrests. Your knees should be bent and your feet should be flat on the floor and shoulder-width apart.  Place your hands lightly on each side of the seat. Keep your back and neck as straight as possible, with your chest slightly forward.  Breathe in slowly. Lean forward and slightly shift your  weight to the front of your feet.  Breathe out as you slowly stand up. Use your hands as little as possible.  Stand and pause for a full breath in and out.  Breathe in as you sit down slowly. Tighten your core and abdominal muscles to control your lowering as much as possible.  Breathe out slowly.  Do this exercise 10-15 times. If needed, do it fewer times until you build up strength.  Rest for 1 minute, then do another set of 10-15 repetitions.  To change the difficulty of the sit-to-stand exercise  If the exercise is too difficult, use a chair with sturdy armrests, and push off the armrests to help you come to the standing position. You can also use the armrests to help slowly lower yourself back to sitting. As this gets easier, try to use your arms less. You can also place a firm cushion or pillow on the chair to make the surface higher.  If this exercise is too easy, do not use your arms to help raise or lower yourself. You can also wear a weighted vest, use hand weights, increase your repetitions, or try a lower chair.  General tips  You may feel tired when starting an exercise routine. This is normal.  You may have muscle soreness that lasts a few days. This is normal. As you get stronger, you may not feel muscle soreness.  Use smooth, steady movements.  Do not  hold your breath during strength exercises. This can cause unsafe changes in your blood pressure.  Breathe in slowly through your nose, and breathe out slowly through your mouth.  Summary  Strengthening your lower body is an important step to help you move safely and independently.  The sit-to-stand exercise helps strengthen the muscles in your thighs and core.  You should always talk with your health care provider before starting any exercise program, especially if you have had recent surgery.  This information is not intended to replace advice given to you by your health care provider. Make sure you discuss any questions you have with your health  care provider.  Document Revised: 10/16/2019 Document Reviewed: 02/08/2018  Elsevier Patient Education © 2021 [employer]    Advance Care Planning and Advance Directives     You make decisions on a daily basis - decisions about where you want to live, your career, your home, your life. Perhaps one of the most important decisions you face is your choice for future medical care. Take time to talk with your family and your healthcare team and start planning today.  Advance Care Planning is a process that can help you:  Understand possible future healthcare decisions in light of your own experiences  Reflect on those decision in light of your goals and values  Discuss your decisions with those closest to you and the healthcare professionals that care for you  Make a plan by creating a document that reflects your wishes    Surrogate Decision Maker  In the event of a medical emergency, which has left you unable to communicate or to make your own decisions, you would need someone to make decisions for you.  It is important to discuss your preferences for medical treatment with this person while you are in good health.     Qualities of a surrogate decision maker:  Willing to take on this role and responsibility  Knows what you want for future medical care  Willing to follow your wishes even if they don't agree with them  Able to make difficult medical decisions under stressful circumstances    Advance Directives  These are legal documents you can create that will guide your healthcare team and decision maker(s) when needed. These documents can be stored in the electronic medical record.    Living Will - a legal document to guide your care if you have a terminal condition or a serious illness and are unable to communicate. States vary by statute in document names/types, but most forms may include one or more of the following:        -  Directions regarding life-prolonging treatments        -  Directions regarding  artificially provided nutrition/hydration        -  Choosing a healthcare decision maker        -  Direction regarding organ/tissue donation    Durable Power of  for Healthcare - this document names an -in-fact to make medical decisions for you, but it may also allow this person to make personal and financial decisions for you. Please seek the advice of an  if you need this type of document.    **Advance Directives are not required and no one may discriminate against you if you do not sign one.    Medical Orders  Many states allow specific forms/orders signed by your physician to record your wishes for medical treatment in your current state of health. This form, signed in personal communication with your physician, addresses resuscitation and other medical interventions that you may or may not want.  For more information or to schedule a time with a [facility] Advance Care Planning Facilitator contact: [facility] or call [phone] and someone will contact you directly.    Fall Prevention in the Home, Adult  Falls can cause injuries and can happen to people of all ages. There are many things you can do to make your home safe and to help prevent falls. Ask for help when making these changes.  What actions can I take to prevent falls?  General Instructions  Use good lighting in all rooms. Replace any light bulbs that burn out.  Turn on the lights in dark areas. Use night-lights.  Keep items that you use often in easy-to-reach places. Lower the shelves around your home if needed.  Set up your furniture so you have a clear path. Avoid moving your furniture around.  Do not have throw rugs or other things on the floor that can make you trip.  Avoid walking on wet floors.  If any of your floors are uneven, fix them.  Add color or contrast paint or tape to clearly layla and help you see:  Grab bars or handrails.  First and last steps of staircases.  Where the edge of each step  is.  If you use a stepladder:  Make sure that it is fully opened. Do not climb a closed stepladder.  Make sure the sides of the stepladder are locked in place.  Ask someone to hold the stepladder while you use it.  Know where your pets are when moving through your home.  What can I do in the bathroom?         Keep the floor dry. Clean up any water on the floor right away.  Remove soap buildup in the tub or shower.  Use nonskid mats or decals on the floor of the tub or shower.  Attach bath mats securely with double-sided, nonslip rug tape.  If you need to sit down in the shower, use a plastic, nonslip stool.  Install grab bars by the toilet and in the tub and shower. Do not use towel bars as grab bars.  What can I do in the bedroom?  Make sure that you have a light by your bed that is easy to reach.  Do not use any sheets or blankets for your bed that hang to the floor.  Have a firm chair with side arms that you can use for support when you get dressed.  What can I do in the kitchen?  Clean up any spills right away.  If you need to reach something above you, use a step stool with a grab bar.  Keep electrical cords out of the way.  Do not use floor polish or wax that makes floors slippery.  What can I do with my stairs?  Do not leave any items on the stairs.  Make sure that you have a light switch at the top and the bottom of the stairs.  Make sure that there are handrails on both sides of the stairs. Fix handrails that are broken or loose.  Install nonslip stair treads on all your stairs.  Avoid having throw rugs at the top or bottom of the stairs.  Choose a carpet that does not hide the edge of the steps on the stairs.  Check carpeting to make sure that it is firmly attached to the stairs. Fix carpet that is loose or worn.  What can I do on the outside of my home?  Use bright outdoor lighting.  Fix the edges of walkways and driveways and fix any cracks.  Remove anything that might make you trip as you walk through a  door, such as a raised step or threshold.  Trim any bushes or trees on paths to your home.  Check to see if handrails are loose or broken and that both sides of all steps have handrails.  Install guardrails along the edges of any raised decks and porches.  Clear paths of anything that can make you trip, such as tools or rocks.  Have leaves, snow, or ice cleared regularly.  Use sand or salt on paths during winter.  Clean up any spills in your garage right away. This includes grease or oil spills.  What other actions can I take?  Wear shoes that:  Have a low heel. Do not wear high heels.  Have rubber bottoms.  Feel good on your feet and fit well.  Are closed at the toe. Do not wear open-toe sandals.  Use tools that help you move around if needed. These include:  Canes.  Walkers.  Scooters.  Crutches.  Review your medicines with your doctor. Some medicines can make you feel dizzy. This can increase your chance of falling.  Ask your doctor what else you can do to help prevent falls.  Where to find more information  Centers for Disease Control and Prevention, [place]: www.cdc.gov  [employer] [place] on Aging: www.tas[place]em.nih.gov  Contact a doctor if:  You are afraid of falling at home.  You feel weak, drowsy, or dizzy at home.  You fall at home.  Summary  There are many simple things that you can do to make your home safe and to help prevent falls.  Ways to make your home safe include removing things that can make you trip and installing grab bars in the bathroom.  Ask for help when making these changes in your home.  This information is not intended to replace advice given to you by your health care provider. Make sure you discuss any questions you have with your health care provider.  Document Revised: 07/21/2021 Document Reviewed: 07/21/2021  Elsevier Patient Education © 2021 Elsevier Inc.         Additional E&M Note during same encounter follows:  Patient has additional, significant, and separately identifiable  condition(s)/problem(s) that require work above and beyond the Medicare Wellness Visit       Objective     Subjective   Tirso Palomo is here for:    Chief Complaint   Patient presents with    Medicare Wellness-subsequent    Hypertension    Hyperlipidemia    Hypothyroidism       Subjective   Angela is also being seen today for an annual adult preventative physical exam.  and nAgela is also being seen today for additional medical problem/s.    Hyperlipidemia  This is a chronic problem. The current episode started more than 1 year ago. Recent lipid tests were reviewed and are high. Exacerbating diseases include obesity. Pertinent negatives include no chest pain or shortness of breath. Risk factors for coronary artery disease include dyslipidemia, hypertension and obesity.   Hypertension  This is a chronic problem. The current episode started more than 1 year ago. The problem has been worse since onset. The problem is uncontrolled. Pertinent negatives include no chest pain, malaise/fatigue, palpitations or shortness of breath. Risk factors for coronary artery disease include obesity and dyslipidemia. Current antihypertension treatment includes angiotensin blockers. The current treatment provides moderate improvement. Compliance problems include diet.              Physical Exam:  Review of Systems   Constitutional:  Negative for malaise/fatigue.   Respiratory:  Negative for shortness of breath.    Cardiovascular:  Negative for chest pain and palpitations.            Physical Exam  Vitals and nursing note reviewed.   Constitutional:       General: She is not in acute distress.     Appearance: She is well-developed. She is not diaphoretic.   HENT:      Head: Normocephalic and atraumatic.      Right Ear: Tympanic membrane and external ear normal.      Left Ear: Tympanic membrane and external ear normal.      Nose: Nose normal.      Mouth/Throat:      Pharynx: No oropharyngeal exudate.   Eyes:      General: No scleral  icterus.        Right eye: No discharge.         Left eye: No discharge.      Conjunctiva/sclera: Conjunctivae normal.      Pupils: Pupils are equal, round, and reactive to light.   Neck:      Thyroid: No thyromegaly.      Trachea: No tracheal deviation.   Cardiovascular:      Rate and Rhythm: Normal rate and regular rhythm.      Heart sounds: Normal heart sounds. No murmur heard.     No friction rub. No gallop.   Pulmonary:      Effort: Pulmonary effort is normal. No respiratory distress.      Breath sounds: Normal breath sounds. No stridor. No wheezing or rales.   Abdominal:      General: Bowel sounds are normal. There is no distension.      Palpations: Abdomen is soft. There is no mass.      Tenderness: There is no abdominal tenderness. There is no guarding or rebound.   Musculoskeletal:         General: No tenderness or deformity. Normal range of motion.      Cervical back: Normal range of motion and neck supple.   Lymphadenopathy:      Cervical: No cervical adenopathy.   Skin:     General: Skin is warm and dry.      Capillary Refill: Capillary refill takes less than 2 seconds.      Coloration: Skin is not pale.      Findings: No erythema or rash.   Neurological:      Mental Status: She is alert and oriented to person, place, and time.      Cranial Nerves: No cranial nerve deficit.      Sensory: No sensory deficit.      Motor: No tremor, atrophy or abnormal muscle tone.      Coordination: Coordination normal.      Gait: Gait normal.      Deep Tendon Reflexes: Reflexes are normal and symmetric. Reflexes normal.   Psychiatric:         Behavior: Behavior normal.         Thought Content: Thought content normal.         Cognition and Memory: Memory is not impaired. She does not exhibit impaired recent memory or impaired remote memory.         Judgment: Judgment normal.         Result Review :   The following data was reviewed by: Miryam Martinez MD on 03/04/2025:         Brief Urine Lab Results  (Last result in the  past 365 days)        Color   Clarity   Blood   Leuk Est   Nitrite   Protein   CREAT   Urine HCG        03/04/25 1205 Yellow   Clear   Negative   Negative   Negative   Trace                     Assessment and Plan:  Problem List Items Addressed This Visit          Cardiac and Vasculature    Essential hypertension    Current Assessment & Plan   Hypertension is uncontrolled  Medication changes per orders.  Dietary sodium restriction.  Weight loss.  Blood pressure will be reassessedin 4 weeks.         Relevant Medications    losartan (COZAAR) 100 MG tablet    Other Relevant Orders    Comprehensive Metabolic Panel (Completed)    Mixed hyperlipidemia    Current Assessment & Plan   Will get labs  Diet controlled  Continue current treatment         Relevant Orders    Lipid Panel With / Chol / HDL Ratio (Completed)       Endocrine and Metabolic    Acquired hypothyroidism    Relevant Orders    TSH (Completed)    Class 2 severe obesity due to excess calories with serious comorbidity and body mass index (BMI) of 35.0 to 35.9 in adult    Current Assessment & Plan   Patient's (Body mass index is 35.17 kg/m².) indicates that they are morbidly/severely obese (BMI > 40 or > 35 with obesity - related health condition) with health conditions that include hypertension . Weight is unchanged. BMI  is above average; BMI management plan is completed. We discussed low calorie, low carb based diet program, portion control, and increasing exercise.          H/O bariatric surgery    Relevant Orders    Ambulatory Referral to Endocrinology    Secondary hyperparathyroidism    Overview   From gastric sleeve         Relevant Orders    Ambulatory Referral to Endocrinology       Family History    Family history of cancer    Relevant Orders    Ambulatory Referral to Hematology / Oncology       Musculoskeletal and Injuries    Chronic arthritis    Relevant Medications    celecoxib (CeleBREX) 200 MG capsule       Tobacco    History of tobacco use        Other    Iron deficiency anemia after gastrectomy    Relevant Orders    Ambulatory Referral to Endocrinology    H/O gastric sleeve    Relevant Orders    Ambulatory Referral to Endocrinology     Other Visit Diagnoses         Medicare annual wellness visit, subsequent    -  Primary    Relevant Orders    POCT urinalysis dipstick, manual (Completed)      Screening mammogram for breast cancer        Relevant Orders    Mammo Screening Digital Tomosynthesis Bilateral With CAD      Screening for cardiovascular condition        Relevant Orders    CT Cardiac Calcium Score Without Dye    Vascular Screening (Bundle) CAR                    Assessment and Plan Additional age appropriate preventative wellness advice topics were discussed during today's preventative wellness exam(some topics already addressed during AWV portion of the note above):    Physical Activity: Advised cardiovascular activity 150 minutes per week as tolerated. (example brisk walk for 30 minutes, 5 days a week).   Nutrition: Discussed nutrition plan with patient. Information shared in after visit summary. Goal is for a well balanced diet to enhance overall health.   Healthy Weight: Discussed current and goal BMI with patient. Steps to attain this goal discussed. Information shared in after visit summary.           Follow Up   No follow-ups on file.  Patient was given instructions and counseling regarding his condition or for health maintenance advice. Please see specific information pulled into the AVS if appropriate.

## 2025-03-04 ENCOUNTER — OFFICE VISIT (OUTPATIENT)
Dept: FAMILY MEDICINE CLINIC | Facility: CLINIC | Age: 65
End: 2025-03-04
Payer: COMMERCIAL

## 2025-03-04 VITALS
OXYGEN SATURATION: 99 % | HEART RATE: 89 BPM | TEMPERATURE: 97.8 F | DIASTOLIC BLOOD PRESSURE: 94 MMHG | RESPIRATION RATE: 18 BRPM | BODY MASS INDEX: 35.25 KG/M2 | WEIGHT: 224.6 LBS | SYSTOLIC BLOOD PRESSURE: 176 MMHG | HEIGHT: 67 IN

## 2025-03-04 DIAGNOSIS — Z98.84 H/O BARIATRIC SURGERY: ICD-10-CM

## 2025-03-04 DIAGNOSIS — D50.8 IRON DEFICIENCY ANEMIA AFTER GASTRECTOMY: ICD-10-CM

## 2025-03-04 DIAGNOSIS — Z13.6 SCREENING FOR CARDIOVASCULAR CONDITION: ICD-10-CM

## 2025-03-04 DIAGNOSIS — Z12.31 SCREENING MAMMOGRAM FOR BREAST CANCER: ICD-10-CM

## 2025-03-04 DIAGNOSIS — Z90.3 H/O GASTRIC SLEEVE: ICD-10-CM

## 2025-03-04 DIAGNOSIS — K91.89 IRON DEFICIENCY ANEMIA AFTER GASTRECTOMY: ICD-10-CM

## 2025-03-04 DIAGNOSIS — M19.90 CHRONIC ARTHRITIS: ICD-10-CM

## 2025-03-04 DIAGNOSIS — E78.2 MIXED HYPERLIPIDEMIA: ICD-10-CM

## 2025-03-04 DIAGNOSIS — I10 ESSENTIAL HYPERTENSION: ICD-10-CM

## 2025-03-04 DIAGNOSIS — N25.81 SECONDARY HYPERPARATHYROIDISM: ICD-10-CM

## 2025-03-04 DIAGNOSIS — Z80.9 FAMILY HISTORY OF CANCER: ICD-10-CM

## 2025-03-04 DIAGNOSIS — E66.01 CLASS 2 SEVERE OBESITY DUE TO EXCESS CALORIES WITH SERIOUS COMORBIDITY AND BODY MASS INDEX (BMI) OF 35.0 TO 35.9 IN ADULT: ICD-10-CM

## 2025-03-04 DIAGNOSIS — Z87.891 HISTORY OF TOBACCO USE: ICD-10-CM

## 2025-03-04 DIAGNOSIS — Z00.00 MEDICARE ANNUAL WELLNESS VISIT, SUBSEQUENT: Primary | ICD-10-CM

## 2025-03-04 DIAGNOSIS — E66.812 CLASS 2 SEVERE OBESITY DUE TO EXCESS CALORIES WITH SERIOUS COMORBIDITY AND BODY MASS INDEX (BMI) OF 35.0 TO 35.9 IN ADULT: ICD-10-CM

## 2025-03-04 DIAGNOSIS — E03.9 ACQUIRED HYPOTHYROIDISM: ICD-10-CM

## 2025-03-04 LAB
BILIRUB BLD-MCNC: NEGATIVE MG/DL
CLARITY, POC: CLEAR
COLOR UR: YELLOW
GLUCOSE UR STRIP-MCNC: NEGATIVE MG/DL
KETONES UR QL: NEGATIVE
LEUKOCYTE EST, POC: NEGATIVE
NITRITE UR-MCNC: NEGATIVE MG/ML
PH UR: 6.5 [PH] (ref 5–8)
PROT UR STRIP-MCNC: ABNORMAL MG/DL
RBC # UR STRIP: NEGATIVE /UL
SP GR UR: 1.01 (ref 1–1.03)
UROBILINOGEN UR QL: NORMAL

## 2025-03-04 RX ORDER — CELECOXIB 200 MG/1
200 CAPSULE ORAL DAILY
Qty: 30 CAPSULE | Refills: 5 | Status: SHIPPED | OUTPATIENT
Start: 2025-03-04

## 2025-03-04 RX ORDER — LOSARTAN POTASSIUM 100 MG/1
100 TABLET ORAL DAILY
Qty: 30 TABLET | Refills: 2 | Status: SHIPPED | OUTPATIENT
Start: 2025-03-04

## 2025-03-05 LAB
ALBUMIN SERPL-MCNC: 4.5 G/DL (ref 3.9–4.9)
ALP SERPL-CCNC: 102 IU/L (ref 44–121)
ALT SERPL-CCNC: 18 IU/L (ref 0–32)
AST SERPL-CCNC: 17 IU/L (ref 0–40)
BILIRUB SERPL-MCNC: 0.4 MG/DL (ref 0–1.2)
BUN SERPL-MCNC: 9 MG/DL (ref 8–27)
BUN/CREAT SERPL: 11 (ref 12–28)
CALCIUM SERPL-MCNC: 10.2 MG/DL (ref 8.7–10.3)
CHLORIDE SERPL-SCNC: 102 MMOL/L (ref 96–106)
CHOLEST SERPL-MCNC: 213 MG/DL (ref 100–199)
CHOLEST/HDLC SERPL: 3.5 RATIO (ref 0–4.4)
CO2 SERPL-SCNC: 24 MMOL/L (ref 20–29)
CREAT SERPL-MCNC: 0.81 MG/DL (ref 0.57–1)
EGFRCR SERPLBLD CKD-EPI 2021: 80 ML/MIN/1.73
GLOBULIN SER CALC-MCNC: 2.5 G/DL (ref 1.5–4.5)
GLUCOSE SERPL-MCNC: 87 MG/DL (ref 70–99)
HDLC SERPL-MCNC: 61 MG/DL
LDLC SERPL CALC-MCNC: 125 MG/DL (ref 0–99)
POTASSIUM SERPL-SCNC: 4.3 MMOL/L (ref 3.5–5.2)
PROT SERPL-MCNC: 7 G/DL (ref 6–8.5)
SODIUM SERPL-SCNC: 140 MMOL/L (ref 134–144)
TRIGL SERPL-MCNC: 154 MG/DL (ref 0–149)
TSH SERPL DL<=0.005 MIU/L-ACNC: 1.28 UIU/ML (ref 0.45–4.5)
VLDLC SERPL CALC-MCNC: 27 MG/DL (ref 5–40)

## 2025-03-08 DIAGNOSIS — E03.9 ACQUIRED HYPOTHYROIDISM: ICD-10-CM

## 2025-03-09 RX ORDER — LEVOTHYROXINE SODIUM 75 UG/1
75 TABLET ORAL DAILY
Qty: 90 TABLET | Refills: 1 | Status: SHIPPED | OUTPATIENT
Start: 2025-03-09

## 2025-03-14 DIAGNOSIS — I10 ESSENTIAL HYPERTENSION: Primary | ICD-10-CM

## 2025-03-14 RX ORDER — AMLODIPINE BESYLATE 5 MG/1
5 TABLET ORAL DAILY
Qty: 30 TABLET | Refills: 12 | Status: SHIPPED | OUTPATIENT
Start: 2025-03-14

## 2025-03-17 NOTE — PROGRESS NOTES
Subjective   Tirso Palomo is a 64 y.o. female. Presents to [facility]    Chief Complaint   Patient presents with    Hypertension       Hypertension  This is a chronic problem. The current episode started more than 1 month ago. Pertinent negatives include no chest pain, headaches, malaise/fatigue, palpitations or shortness of breath. Risk factors for coronary artery disease include obesity and dyslipidemia. Current antihypertension treatment includes angiotensin blockers. The current treatment provides mild improvement.        I personally reviewed and updated the patient's allergies, medications, problem list, and past medical, surgical, social, and family history. I have reviewed and confirmed the accuracy of the History of Present Illness and Review of Symptoms as documented by the MA/LPN/RN. Miryam Martinez MD    Allergies:  Allergies   Allergen Reactions    Benadryl [Diphenhydramine] Other (See Comments)     Makes her too sleepy    Lisinopril Cough    Topamax [Topiramate] GI Intolerance    Actigall [Ursodiol] Other (See Comments)     Burning in lining of stomach     Amlodipine Palpitations       Social History:  Social History     Socioeconomic History    Marital status:    Tobacco Use    Smoking status: Former     Current packs/day: 0.00     Average packs/day: 0.3 packs/day for 30.0 years (7.5 ttl pk-yrs)     Types: Cigarettes     Start date: 1990     Quit date: 2010     Years since quittin.6     Passive exposure: Past    Smokeless tobacco: Never    Tobacco comments:     Quit cold turkey   Vaping Use    Vaping status: Never Used   Substance and Sexual Activity    Alcohol use: No    Drug use: No    Sexual activity: Yes     Partners: Male     Birth control/protection: Post-menopausal       Family History:  Family History   Problem Relation Age of Onset    Breast cancer Mother     Cancer Mother         breast    Hypertension Father     Heart attack Father     Cancer  Father         prostate    Obesity Brother     Hypertension Brother     Arthritis Brother     Heart disease Maternal Grandmother     Diabetes Maternal Grandmother     Cancer Paternal Grandmother     Breast cancer Maternal Aunt     Obesity Maternal Grandfather     Stroke Maternal Grandfather     Hypertension Maternal Grandfather     Stroke Paternal Grandfather     Heart attack Paternal Grandfather     Hypertension Paternal Grandfather        Past Medical History :  Patient Active Problem List   Diagnosis    Chronic fatigue    Essential hypertension    Snoring    Hemorrhoids    Multiple joint pain    Acquired hypothyroidism    Screening mammogram, encounter for    Screening for cervical cancer    Screening for colon cancer    Encounter for general adult medical examination with abnormal findings    Vitamin D deficiency    Urinary incontinence    Stress incontinence of urine    Spondylosis of lumbosacral spine without myelopathy    Class 2 severe obesity due to excess calories with serious comorbidity and body mass index (BMI) of 35.0 to 35.9 in adult    Menopause present    Hypermobility of urethra    History of colonic polyps    Female bladder prolapse    Abscess of right breast    Normal pelvic exam    Epidermal inclusion cyst    Status post sleeve gastrectomy    Chronic idiopathic constipation    History of tobacco use    Vaginal dryness    Alopecia    Change in bowel habit    Family history of colon cancer    H/O bariatric surgery    Hiatal hernia with GERD    Influenza vaccine needed    Mixed hyperlipidemia    Numerous moles    Cervical radiculopathy    Lumbar radiculopathy    Spasm of muscle of lower back    Skin lesion of chest wall    Left hip pain    Iliotibial band syndrome of left side    Chronic arthritis    Iron deficiency anemia after gastrectomy    H/O gastric sleeve    Secondary hyperparathyroidism    Family history of cancer       Medication List:    Current Outpatient Medications:     acetaminophen  (TYLENOL) 500 MG tablet, Take 2 tablets by mouth Every 6 (Six) Hours As Needed (pain)., Disp: 60 tablet, Rfl: 0    celecoxib (CeleBREX) 200 MG capsule, Take 1 capsule by mouth Daily., Disp: 30 capsule, Rfl: 5    clobetasol (TEMOVATE) 0.05 % external solution, APPLY 4 DROPS ON THE SCALP AND GENTLY MASSAGE IN WITH CLEAN FINGERTIPS., Disp: , Rfl:     famotidine (PEPCID) 10 MG tablet, Take 1 tablet by mouth 2 (Two) Times a Day., Disp: , Rfl:     ferrous sulfate 325 (65 FE) MG tablet, TAKE 1 TABLET BY MOUTH EVERY DAY WITH BREAKFAST, Disp: 90 tablet, Rfl: 1    levothyroxine (SYNTHROID, LEVOTHROID) 75 MCG tablet, TAKE 1 TABLET BY MOUTH EVERY DAY, Disp: 90 tablet, Rfl: 1    Minoxidil 5 % foam, Apply 1 application topically As Needed., Disp: , Rfl:     multivitamin with minerals tablet tablet, Take 1 tablet by mouth Daily. Bariatric, Disp: , Rfl:     NON FORMULARY, 4 (Four) Times a Day. Weem, Disp: , Rfl:     omeprazole (priLOSEC) 40 MG capsule, Take 1 capsule by mouth Daily., Disp: 30 capsule, Rfl: 6    polyethylene glycol (MiraLax) 17 GM/SCOOP powder, mix 1 capful (17 grams) in liquid and drink by mouth daily (Patient taking differently: 17 g Daily. Pt taking on occation), Disp: 510 g, Rfl: 1    Xiidra 5 % ophthalmic solution, instill 1 drop into both eyes twice a day, Disp: , Rfl:     losartan (COZAAR) 100 MG tablet, Take 1 tablet by mouth Daily., Disp: 90 tablet, Rfl: 3    metoprolol succinate XL (TOPROL-XL) 25 MG 24 hr tablet, Take 1 tablet by mouth Daily., Disp: 30 tablet, Rfl: 12    Past Surgical History:  Past Surgical History:   Procedure Laterality Date    BREAST SURGERY Right 2012    biopsy    COLONOSCOPY  2018 2018 2023    ENDOSCOPY N/A 02/09/2023    Procedure: ESOPHAGOGASTRODUODENOSCOPY with gastric biopsy;  Surgeon: Yue Jones MD;  Location: [facility] ENDOSCOPY;  Service: General;  Laterality: N/A;  normal    ENDOSCOPY N/A 11/30/2023    Procedure: ESOPHAGOGASTRODUODENOSCOPY with esophageal balloon dilation  "to 20mm;  Surgeon: Yue Jones MD;  Location: [facility] ENDOSCOPY;  Service: General;  Laterality: N/A;  stricture of gastric body    GASTRIC SLEEVE LAPAROSCOPIC N/A 03/16/2023    Procedure: GASTRIC SLEEVE LAPAROSCOPIC WITH DAVINCI ROBOT; HIATAL HERNIA REPAIR;  Surgeon: Yue Jones MD;  Location: [facility] MAIN OR;  Service: Robotics - DaVinci;  Laterality: N/A;    HIATAL HERNIA REPAIR N/A 12/6/2023    Procedure: LAPAROSCOPIC HIATAL HERNIA REPAIR; LIGAMENTUN TERES CARDIOPEXY WITH DAVINCI ROBOT;  Surgeon: Yue Jones MD;  Location: [facility] MAIN OR;  Service: Robotics - DaVinci;  Laterality: N/A;    TOENAIL EXCISION      01/2022    TUBAL ABDOMINAL LIGATION  1993         Physical Exam:      Vital Signs:    Vitals:    03/20/25 1221   BP: 150/92   Pulse: 86   Resp: 18   Temp: 97.5 °F (36.4 °C)   SpO2: 99%        /92 (BP Location: Right arm, Patient Position: Sitting, Cuff Size: Adult)   Pulse 86   Temp 97.5 °F (36.4 °C) (Temporal)   Resp 18   Ht 170.2 cm (67.01\")   Wt 104 kg (228 lb 9.6 oz)   LMP  (LMP Unknown)   SpO2 99% Comment: ra  BMI 35.79 kg/m²     Wt Readings from Last 3 Encounters:   03/20/25 104 kg (228 lb 9.6 oz)   03/04/25 102 kg (224 lb 9.6 oz)   02/03/25 102 kg (225 lb)       Result Review :   The following data was reviewed by: Miryam Martinez MD on 03/20/2025:  CMP          6/13/2024    11:21 12/18/2024    16:49 3/4/2025    12:55   CMP   Glucose 88  108  87    BUN 8  10  9    Creatinine 0.82  0.79  0.81    EGFR 79  82.6  80    Sodium 138  140  140    Potassium 4.9  3.9  4.3    Chloride 102  106  102    Calcium 9.7  9.4  10.2    Total Protein 7.1  6.8  7.0    Albumin 4.6  4.3  4.5    Globulin 2.5  2.5  2.5    Total Bilirubin 0.2  <0.2  0.4    Alkaline Phosphatase 88  87  102    AST (SGOT) 21  16  17    ALT (SGPT) 16  16  18    Albumin/Globulin Ratio 1.8  1.7     BUN/Creatinine Ratio 10  12.7  11    Anion Gap  7.0       Lipid Panel          6/13/2024    11:21 10/17/2024    09:57 3/4/2025    12:55 "   Lipid Panel   Total Cholesterol 215  232  213    Triglycerides 136  177  154    HDL Cholesterol 61  62  61    VLDL Cholesterol 24  31  27    LDL Cholesterol  130  139  125               Physical Exam  Vitals reviewed.   Constitutional:       Appearance: Normal appearance. She is well-developed.   HENT:      Head: Normocephalic and atraumatic.   Eyes:      General:         Right eye: No discharge.         Left eye: No discharge.   Cardiovascular:      Rate and Rhythm: Normal rate and regular rhythm.      Heart sounds: Normal heart sounds. No murmur heard.     No friction rub. No gallop.   Pulmonary:      Effort: Pulmonary effort is normal. No respiratory distress.      Breath sounds: Normal breath sounds. No wheezing or rales.   Skin:     General: Skin is warm and dry.      Findings: No rash.   Neurological:      Mental Status: She is alert and oriented to person, place, and time.      Coordination: Coordination normal.      Gait: Gait normal.   Psychiatric:         Behavior: Behavior is cooperative.         Assessment and Plan:  Problems Addressed this Visit          Cardiac and Vasculature    Essential hypertension - Primary    Hypertension is uncontrolled  Medication changes per orders.  Dietary sodium restriction.  Weight loss.  Blood pressure will be reassessedin 4 weeks.  Continue losartan and add metoprolol   She did not get amlodopine         Relevant Medications    metoprolol succinate XL (TOPROL-XL) 25 MG 24 hr tablet    Mixed hyperlipidemia     Lipid abnormalities are improving with lifestyle modifications    Plan:  Discussed diet changes, consider statin at next visit.      Discussed medication dosage, use, side effects, and goals of treatment in detail.    Counseled patient on lifestyle modifications to help control hyperlipidemia.     Patient Treatment Goals:   LDL goal is under 100    Followup in 3 months.            Endocrine and Metabolic    Class 2 severe obesity due to excess calories with  serious comorbidity and body mass index (BMI) of 35.0 to 35.9 in adult       Tobacco    History of tobacco use     Diagnoses         Codes Comments      Essential hypertension    -  Primary ICD-10-CM: I10  ICD-9-CM: 401.9       Class 2 severe obesity due to excess calories with serious comorbidity and body mass index (BMI) of 35.0 to 35.9 in adult     ICD-10-CM: E66.812, E66.01, Z68.35  ICD-9-CM: 278.01, V85.35       History of tobacco use     ICD-10-CM: Z87.891  ICD-9-CM: V15.82       Mixed hyperlipidemia     ICD-10-CM: E78.2  ICD-9-CM: 272.2                          An After Visit Summary and PPPS were given to the patient.       This document is intended for medical expert use only. Reading of this document by patients and/or patient's family without participating medical staff guidance may result in misinterpretation and unintended morbidity. Any interpretation of such data is the responsibility of the patient and/or family member responsible for the patient in concert with their primary or specialist providers, not to be left for sources of online searches such as [employer], [employer] or similar queries. Relying on these approaches to knowledge may result in misinterpretation, misguided goals of care and even death should patients or family members try recommendations outside of the realm of professional medical care.

## 2025-03-18 NOTE — ASSESSMENT & PLAN NOTE
Patient's (Body mass index is 35.17 kg/m².) indicates that they are morbidly/severely obese (BMI > 40 or > 35 with obesity - related health condition) with health conditions that include hypertension . Weight is unchanged. BMI  is above average; BMI management plan is completed. We discussed low calorie, low carb based diet program, portion control, and increasing exercise.

## 2025-03-18 NOTE — ASSESSMENT & PLAN NOTE
Hypertension is uncontrolled  Medication changes per orders.  Dietary sodium restriction.  Weight loss.  Blood pressure will be reassessedin 4 weeks.

## 2025-03-20 ENCOUNTER — TELEPHONE (OUTPATIENT)
Dept: FAMILY MEDICINE CLINIC | Facility: CLINIC | Age: 65
End: 2025-03-20

## 2025-03-20 ENCOUNTER — OFFICE VISIT (OUTPATIENT)
Dept: FAMILY MEDICINE CLINIC | Facility: CLINIC | Age: 65
End: 2025-03-20
Payer: COMMERCIAL

## 2025-03-20 VITALS
SYSTOLIC BLOOD PRESSURE: 150 MMHG | DIASTOLIC BLOOD PRESSURE: 92 MMHG | HEART RATE: 86 BPM | HEIGHT: 67 IN | BODY MASS INDEX: 35.88 KG/M2 | TEMPERATURE: 97.5 F | OXYGEN SATURATION: 99 % | RESPIRATION RATE: 18 BRPM | WEIGHT: 228.6 LBS

## 2025-03-20 DIAGNOSIS — E66.812 CLASS 2 SEVERE OBESITY DUE TO EXCESS CALORIES WITH SERIOUS COMORBIDITY AND BODY MASS INDEX (BMI) OF 35.0 TO 35.9 IN ADULT: ICD-10-CM

## 2025-03-20 DIAGNOSIS — E78.2 MIXED HYPERLIPIDEMIA: ICD-10-CM

## 2025-03-20 DIAGNOSIS — Z87.891 HISTORY OF TOBACCO USE: ICD-10-CM

## 2025-03-20 DIAGNOSIS — I10 ESSENTIAL HYPERTENSION: Primary | ICD-10-CM

## 2025-03-20 DIAGNOSIS — E66.01 CLASS 2 SEVERE OBESITY DUE TO EXCESS CALORIES WITH SERIOUS COMORBIDITY AND BODY MASS INDEX (BMI) OF 35.0 TO 35.9 IN ADULT: ICD-10-CM

## 2025-03-20 RX ORDER — METOPROLOL SUCCINATE 25 MG/1
25 TABLET, EXTENDED RELEASE ORAL DAILY
Qty: 30 TABLET | Refills: 12 | Status: SHIPPED | OUTPATIENT
Start: 2025-03-20

## 2025-03-20 NOTE — ASSESSMENT & PLAN NOTE
Hypertension is uncontrolled  Medication changes per orders.  Dietary sodium restriction.  Weight loss.  Blood pressure will be reassessedin 4 weeks.  Continue losartan and add metoprolol   She did not get amlodopine

## 2025-03-20 NOTE — TELEPHONE ENCOUNTER
Patient sent me a [employer] message for referrals to contact [facility] the number is  so she can see a dietician.

## 2025-03-28 DIAGNOSIS — I10 ESSENTIAL HYPERTENSION: ICD-10-CM

## 2025-03-28 RX ORDER — LOSARTAN POTASSIUM 100 MG/1
100 TABLET ORAL DAILY
Qty: 90 TABLET | Refills: 3 | Status: SHIPPED | OUTPATIENT
Start: 2025-03-28

## 2025-03-30 NOTE — ASSESSMENT & PLAN NOTE
Lipid abnormalities are improving with lifestyle modifications    Plan:  Discussed diet changes, consider statin at next visit.      Discussed medication dosage, use, side effects, and goals of treatment in detail.    Counseled patient on lifestyle modifications to help control hyperlipidemia.     Patient Treatment Goals:   LDL goal is under 100    Followup in 3 months.

## 2025-04-14 RX ORDER — FERROUS SULFATE 325(65) MG
1 TABLET ORAL
Qty: 90 TABLET | Refills: 0 | Status: SHIPPED | OUTPATIENT
Start: 2025-04-14

## 2025-04-16 ENCOUNTER — HOSPITAL ENCOUNTER (OUTPATIENT)
Dept: CT IMAGING | Facility: HOSPITAL | Age: 65
Discharge: HOME | End: 2025-04-16

## 2025-04-16 ENCOUNTER — HOSPITAL ENCOUNTER (OUTPATIENT)
Dept: ULTRASOUND IMAGING | Facility: HOSPITAL | Age: 65
Discharge: HOME | End: 2025-04-16

## 2025-04-16 DIAGNOSIS — Z13.6 SCREENING FOR CARDIOVASCULAR CONDITION: ICD-10-CM

## 2025-04-16 LAB
BH CV VAS SCREENING CAROTID CCA LEFT: 65 CM/SEC
BH CV VAS SCREENING CAROTID CCA RIGHT: 56 CM/SEC
BH CV VAS SCREENING CAROTID ICA LEFT: 113 CM/SEC
BH CV VAS SCREENING CAROTID ICA RIGHT: 77 CM/SEC
BH CV XLRA MEAS - MID AO DIAM: 1.85 CM
BH CV XLRA MEAS - PAD LEFT ABI PT: 1.08
BH CV XLRA MEAS - PAD LEFT ARM: 177 MMHG
BH CV XLRA MEAS - PAD LEFT LEG PT: 192 MMHG
BH CV XLRA MEAS - PAD RIGHT ABI PT: 1.11
BH CV XLRA MEAS - PAD RIGHT ARM: 175 MMHG
BH CV XLRA MEAS - PAD RIGHT LEG PT: 196 MMHG
BH CV XLRA MEAS LEFT DIST CCA EDV: 20.2 CM/SEC
BH CV XLRA MEAS LEFT DIST CCA PSV: 64.9 CM/SEC
BH CV XLRA MEAS LEFT ICA/CCA RATIO: 1.74
BH CV XLRA MEAS LEFT PROX ICA EDV: 39.3 CM/SEC
BH CV XLRA MEAS LEFT PROX ICA PSV: 113.1 CM/SEC
BH CV XLRA MEAS RIGHT DIST CCA EDV: 13.1 CM/SEC
BH CV XLRA MEAS RIGHT DIST CCA PSV: 55.9 CM/SEC
BH CV XLRA MEAS RIGHT ICA/CCA RATIO: 1.38
BH CV XLRA MEAS RIGHT PROX ICA EDV: 26.8 CM/SEC
BH CV XLRA MEAS RIGHT PROX ICA PSV: 77.4 CM/SEC

## 2025-04-16 PROCEDURE — 75571 CT HRT W/O DYE W/CA TEST: CPT

## 2025-04-16 PROCEDURE — VASCULARSCN2 VASCULAR SCREENING (BUNDLE) CAR: Performed by: INTERNAL MEDICINE

## 2025-04-16 PROCEDURE — 93799 UNLISTED CV SVC/PROCEDURE: CPT

## 2025-04-17 NOTE — PROGRESS NOTES
Subjective   Tirso Palomo is a 64 y.o. female. Presents to [facility]    Chief Complaint   Patient presents with    Hypertension       Hypertension  This is a chronic problem. The current episode started more than 1 month ago. The problem is uncontrolled. Pertinent negatives include no chest pain, headaches, malaise/fatigue, palpitations or shortness of breath. Risk factors for coronary artery disease include obesity and dyslipidemia. Current antihypertension treatment includes angiotensin blockers. The current treatment provides mild improvement.        I personally reviewed and updated the patient's allergies, medications, problem list, and past medical, surgical, social, and family history. I have reviewed and confirmed the accuracy of the History of Present Illness and Review of Symptoms as documented by the MA/LPN/RN. Miryam Martinez MD    Allergies:  Allergies   Allergen Reactions    Benadryl [Diphenhydramine] Other (See Comments)     Makes her too sleepy    Lisinopril Cough    Topamax [Topiramate] GI Intolerance    Actigall [Ursodiol] Other (See Comments)     Burning in lining of stomach     Amlodipine Palpitations       Social History:  Social History     Socioeconomic History    Marital status:    Tobacco Use    Smoking status: Former     Current packs/day: 0.00     Average packs/day: 0.3 packs/day for 30.0 years (7.5 ttl pk-yrs)     Types: Cigarettes     Start date: 1990     Quit date: 2010     Years since quittin.7     Passive exposure: Past    Smokeless tobacco: Never    Tobacco comments:     Quit cold turkey   Vaping Use    Vaping status: Never Used   Substance and Sexual Activity    Alcohol use: No    Drug use: No    Sexual activity: Yes     Partners: Male     Birth control/protection: Post-menopausal       Family History:  Family History   Problem Relation Age of Onset    Breast cancer Mother     Cancer Mother         breast    Hypertension Father     Heart  attack Father     Cancer Father         prostate    Obesity Brother     Hypertension Brother     Arthritis Brother     Heart disease Maternal Grandmother     Diabetes Maternal Grandmother     Cancer Paternal Grandmother     Breast cancer Maternal Aunt     Obesity Maternal Grandfather     Stroke Maternal Grandfather     Hypertension Maternal Grandfather     Stroke Paternal Grandfather     Heart attack Paternal Grandfather     Hypertension Paternal Grandfather        Past Medical History :  Patient Active Problem List   Diagnosis    Chronic fatigue    Essential hypertension    Snoring    Hemorrhoids    Multiple joint pain    Acquired hypothyroidism    Screening mammogram, encounter for    Screening for cervical cancer    Screening for colon cancer    Encounter for general adult medical examination with abnormal findings    Vitamin D deficiency    Urinary incontinence    Stress incontinence of urine    Spondylosis of lumbosacral spine without myelopathy    Class 2 severe obesity due to excess calories with serious comorbidity and body mass index (BMI) of 36.0 to 36.9 in adult    Menopause present    Hypermobility of urethra    History of colonic polyps    Female bladder prolapse    Abscess of right breast    Normal pelvic exam    Epidermal inclusion cyst    Status post sleeve gastrectomy    Chronic idiopathic constipation    History of tobacco use    Vaginal dryness    Alopecia    Change in bowel habit    Family history of colon cancer    H/O bariatric surgery    Hiatal hernia with GERD    Influenza vaccine needed    Mixed hyperlipidemia    Numerous moles    Cervical radiculopathy    Lumbar radiculopathy    Spasm of muscle of lower back    Skin lesion of chest wall    Left hip pain    Iliotibial band syndrome of left side    Chronic arthritis    Iron deficiency anemia after gastrectomy    H/O gastric sleeve    Secondary hyperparathyroidism    Family history of cancer       Medication List:    Current Outpatient  Medications:     acetaminophen (TYLENOL) 500 MG tablet, Take 2 tablets by mouth Every 6 (Six) Hours As Needed (pain)., Disp: 60 tablet, Rfl: 0    celecoxib (CeleBREX) 200 MG capsule, Take 1 capsule by mouth Daily., Disp: 30 capsule, Rfl: 5    clobetasol (TEMOVATE) 0.05 % external solution, APPLY 4 DROPS ON THE SCALP AND GENTLY MASSAGE IN WITH CLEAN FINGERTIPS., Disp: , Rfl:     famotidine (PEPCID) 10 MG tablet, Take 1 tablet by mouth 2 (Two) Times a Day., Disp: , Rfl:     ferrous sulfate 325 (65 FE) MG tablet, TAKE 1 TABLET BY MOUTH EVERY DAY WITH BREAKFAST, Disp: 90 tablet, Rfl: 0    levothyroxine (SYNTHROID, LEVOTHROID) 75 MCG tablet, TAKE 1 TABLET BY MOUTH EVERY DAY, Disp: 90 tablet, Rfl: 1    losartan (COZAAR) 100 MG tablet, Take 0.5 tablets by mouth 2 (Two) Times a Day., Disp: 90 tablet, Rfl: 3    metoprolol succinate XL (TOPROL-XL) 25 MG 24 hr tablet, Take 1 tablet by mouth Daily., Disp: 30 tablet, Rfl: 12    Minoxidil 5 % foam, Apply 1 application topically As Needed., Disp: , Rfl:     multivitamin with minerals tablet tablet, Take 1 tablet by mouth Daily. Bariatric, Disp: , Rfl:     NON FORMULARY, 4 (Four) Times a Day. Weem, Disp: , Rfl:     omeprazole (priLOSEC) 40 MG capsule, Take 1 capsule by mouth Daily., Disp: 30 capsule, Rfl: 6    polyethylene glycol (MiraLax) 17 GM/SCOOP powder, mix 1 capful (17 grams) in liquid and drink by mouth daily (Patient taking differently: 17 g Daily. Pt taking on occation), Disp: 510 g, Rfl: 1    Xiidra 5 % ophthalmic solution, instill 1 drop into both eyes twice a day, Disp: , Rfl:     pravastatin (PRAVACHOL) 10 MG tablet, Take 1 tablet by mouth Every Night., Disp: 30 tablet, Rfl: 12    Past Surgical History:  Past Surgical History:   Procedure Laterality Date    BREAST SURGERY Right 2012    biopsy    COLONOSCOPY  2018 2018 2023    ENDOSCOPY N/A 02/09/2023    Procedure: ESOPHAGOGASTRODUODENOSCOPY with gastric biopsy;  Surgeon: Yue Jones MD;  Location:  [facility]  "ENDOSCOPY;  Service: General;  Laterality: N/A;  normal    ENDOSCOPY N/A 11/30/2023    Procedure: ESOPHAGOGASTRODUODENOSCOPY with esophageal balloon dilation to 20mm;  Surgeon: Yue Jones MD;  Location: [facility] ENDOSCOPY;  Service: General;  Laterality: N/A;  stricture of gastric body    GASTRIC SLEEVE LAPAROSCOPIC N/A 03/16/2023    Procedure: GASTRIC SLEEVE LAPAROSCOPIC WITH DAVINCI ROBOT; HIATAL HERNIA REPAIR;  Surgeon: Yue Jones MD;  Location: [facility] MAIN OR;  [facility];  Laterality: N/A;    HIATAL HERNIA REPAIR N/A 12/6/2023    Procedure: LAPAROSCOPIC HIATAL HERNIA REPAIR; LIGAMENTUN TERES CARDIOPEXY WITH DAVINCI ROBOT;  Surgeon: Yue Jones MD;  Location: [facility] MAIN OR;  Service: Robotics - [employer];  Laterality: N/A;    TOENAIL EXCISION      01/2022    TUBAL ABDOMINAL LIGATION  1993         Physical Exam:      Vital Signs:    Vitals:    04/21/25 1005   BP: 146/88   Pulse: 85   Resp: 17   Temp: 96.8 °F (36 °C)   SpO2: 98%        /88 (BP Location: Right arm, Patient Position: Sitting, Cuff Size: Adult)   Pulse 85   Temp 96.8 °F (36 °C) (Temporal)   Resp 17   Ht 170.2 cm (67.01\")   Wt 106 kg (232 lb 12.8 oz)   LMP  (LMP Unknown)   SpO2 98% Comment: ra  BMI 36.45 kg/m²     Wt Readings from Last 3 Encounters:   04/21/25 106 kg (232 lb 12.8 oz)   03/20/25 104 kg (228 lb 9.6 oz)   03/04/25 102 kg (224 lb 9.6 oz)       Result Review :   The following data was reviewed by: Miryam Martinez MD on 04/21/2025:           CT Cardiac Calcium Score Without Dye  Result Date: 4/16/2025  Impression: Total coronary calcium score is 0 Benign calcified granulomatous changes in the chest. Surgical changes of the esophagogastric junction. Calcium Score Interpretation 0 -- Negative examination, no identifiable atherosclerotic plaque.  Very low risk of cardiac events in the next 5 years. 1-10 -- Minimal plaque burden.  Low risk of cardiac events in the next 5 years. 11- 100 -- Mild Plaque burden.  " Mild risk of cardiac events in the next 5 years. 101 - 400 -- Moderate plaque burden.  Moderate risk of cardiac events in the next 5 years. Over 400 -- Extensive plaque burden.  High risk of cardiac events in the next 5 years. Electronically Signed: Brittany Caro MD  4/16/2025 2:39 PM EDT  Workstation ID: [id number]     Vascular Screening (Bundle) CAR    Accession Number: [id number]   Date of Study: 4/16/25   Ordering Provider: Miryam Martinez MD   Clinical Indications: screen        Reading Physicians  Performing Staff   Cardiology: Kelby Kruse MD     Tech: Caridad Galicia RVS    Support Staff: Lian Rivas RVT         Clinical Indication    screen   Dx: Screening for cardiovascular condition [Z13.6 (ICD-10-CM)]     Interpretation Summary         Normal screening vascular ultrasound study        Patient Hx Of Height, Weight, and Vitals    Height Weight BSA (Calculated - sq m) BMI (Calculated) Retired BMI (kg/m2) Pulse BP              Additional Study Details    The study is technically good for diagnosis.     Study Impression    Carotid Artery Disease and Stroke Screening:   The right carotid artery is normal.  The left carotid artery is normal.       Abdominal Aortic Aneurysm (AAA) Screening:   Maximum mid diameter of 1.85 cm.  The artery was normal.       Peripheral Artery Disease (P.A.D.) Screening:  The right has normal arterial pressures.  The left has normal arterial pressures.       Physical Exam  Vitals reviewed.   Constitutional:       Appearance: Normal appearance. She is well-developed.   HENT:      Head: Normocephalic and atraumatic.   Eyes:      General:         Right eye: No discharge.         Left eye: No discharge.   Cardiovascular:      Rate and Rhythm: Normal rate and regular rhythm.      Heart sounds: Normal heart sounds. No murmur heard.     No friction rub. No gallop.   Pulmonary:      Effort: Pulmonary effort is normal. No respiratory distress.      Breath sounds: Normal  breath sounds. No wheezing or rales.   Skin:     General: Skin is warm and dry.      Findings: No rash.   Neurological:      Mental Status: She is alert and oriented to person, place, and time.      Coordination: Coordination normal.      Gait: Gait normal.   Psychiatric:         Behavior: Behavior is cooperative.           Assessment and Plan:  Problems Addressed this Visit          Cardiac and Vasculature    Essential hypertension - Primary    Hypertension is borderline  Medication changes per orders.  Dietary sodium restriction.  Weight loss.  Blood pressure will be reassessedin 4 weeks.         Relevant Medications    losartan (COZAAR) 100 MG tablet    Mixed hyperlipidemia     Lipid abnormalities are worsening    Plan:  Begin taking the following medication/s; pravastatin.      Discussed medication dosage, use, side effects, and goals of treatment in detail.    Counseled patient on lifestyle modifications to help control hyperlipidemia.     Patient Treatment Goals:   LDL goal is under 100    Followup in 3 months.         Relevant Medications    pravastatin (PRAVACHOL) 10 MG tablet       Endocrine and Metabolic    Class 2 severe obesity due to excess calories with serious comorbidity and body mass index (BMI) of 36.0 to 36.9 in adult    Patient's (Body mass index is 36.45 kg/m².) indicates that they are morbidly/severely obese (BMI > 40 or > 35 with obesity - related health condition) with health conditions that include hypertension . Weight is worsening. BMI  is above average; BMI management plan is completed. We discussed low calorie, low carb based diet program, portion control, and increasing exercise.             Family History    Family history of cancer    Relevant Orders    Ambulatory Referral to Genetic Counseling/Testing       Tobacco    History of tobacco use       Other    H/O gastric sleeve    Relevant Orders    Ambulatory Referral to Bariatric Surgery     Diagnoses         Codes Comments       Essential hypertension    -  Primary ICD-10-CM: I10  ICD-9-CM: 401.9       Class 2 severe obesity due to excess calories with serious comorbidity and body mass index (BMI) of 36.0 to 36.9 in adult     ICD-10-CM: E66.812, E66.01, Z68.36  ICD-9-CM: 278.01, V85.36       History of tobacco use     ICD-10-CM: Z87.891  ICD-9-CM: V15.82       Mixed hyperlipidemia     ICD-10-CM: E78.2  ICD-9-CM: 272.2       H/O gastric sleeve     ICD-10-CM: Z90.3  ICD-9-CM: V15.29       Family history of cancer     ICD-10-CM: Z80.9  ICD-9-CM: V16.9                          An After Visit Summary and PPPS were given to the patient.       This document is intended for medical expert use only. Reading of this document by patients and/or patient's family without participating medical staff guidance may result in misinterpretation and unintended morbidity. Any interpretation of such data is the responsibility of the patient and/or family member responsible for the patient in concert with their primary or specialist providers, not to be left for sources of online searches such as XtremIO, EVOFEM or similar queries. Relying on these approaches to knowledge may result in misinterpretation, misguided goals of care and even death should patients or family members try recommendations outside of the realm of professional medical care.

## 2025-04-21 ENCOUNTER — OFFICE VISIT (OUTPATIENT)
Dept: FAMILY MEDICINE CLINIC | Facility: CLINIC | Age: 65
End: 2025-04-21
Payer: COMMERCIAL

## 2025-04-21 VITALS
BODY MASS INDEX: 36.54 KG/M2 | TEMPERATURE: 96.8 F | SYSTOLIC BLOOD PRESSURE: 146 MMHG | OXYGEN SATURATION: 98 % | RESPIRATION RATE: 17 BRPM | HEART RATE: 85 BPM | DIASTOLIC BLOOD PRESSURE: 88 MMHG | WEIGHT: 232.8 LBS | HEIGHT: 67 IN

## 2025-04-21 DIAGNOSIS — Z80.9 FAMILY HISTORY OF CANCER: ICD-10-CM

## 2025-04-21 DIAGNOSIS — E66.812 CLASS 2 SEVERE OBESITY DUE TO EXCESS CALORIES WITH SERIOUS COMORBIDITY AND BODY MASS INDEX (BMI) OF 36.0 TO 36.9 IN ADULT: ICD-10-CM

## 2025-04-21 DIAGNOSIS — Z90.3 H/O GASTRIC SLEEVE: ICD-10-CM

## 2025-04-21 DIAGNOSIS — E66.01 CLASS 2 SEVERE OBESITY DUE TO EXCESS CALORIES WITH SERIOUS COMORBIDITY AND BODY MASS INDEX (BMI) OF 36.0 TO 36.9 IN ADULT: ICD-10-CM

## 2025-04-21 DIAGNOSIS — I10 ESSENTIAL HYPERTENSION: Primary | ICD-10-CM

## 2025-04-21 DIAGNOSIS — E78.2 MIXED HYPERLIPIDEMIA: ICD-10-CM

## 2025-04-21 DIAGNOSIS — Z87.891 HISTORY OF TOBACCO USE: ICD-10-CM

## 2025-04-21 RX ORDER — PRAVASTATIN SODIUM 10 MG
10 TABLET ORAL NIGHTLY
Qty: 30 TABLET | Refills: 12 | Status: SHIPPED | OUTPATIENT
Start: 2025-04-21

## 2025-04-21 RX ORDER — LOSARTAN POTASSIUM 100 MG/1
50 TABLET ORAL 2 TIMES DAILY
Qty: 90 TABLET | Refills: 3 | Status: SHIPPED | OUTPATIENT
Start: 2025-04-21

## 2025-04-21 NOTE — ASSESSMENT & PLAN NOTE
Lipid abnormalities are worsening    Plan:  Begin taking the following medication/s; pravastatin.      Discussed medication dosage, use, side effects, and goals of treatment in detail.    Counseled patient on lifestyle modifications to help control hyperlipidemia.     Patient Treatment Goals:   LDL goal is under 100    Followup in 3 months.

## 2025-04-30 ENCOUNTER — TELEPHONE (OUTPATIENT)
Dept: BARIATRICS/WEIGHT MGMT | Facility: CLINIC | Age: 65
End: 2025-04-30
Payer: COMMERCIAL

## 2025-05-01 NOTE — ASSESSMENT & PLAN NOTE
Patient's (Body mass index is 36.45 kg/m².) indicates that they are morbidly/severely obese (BMI > 40 or > 35 with obesity - related health condition) with health conditions that include hypertension . Weight is worsening. BMI  is above average; BMI management plan is completed. We discussed low calorie, low carb based diet program, portion control, and increasing exercise.

## 2025-05-16 ENCOUNTER — OFFICE VISIT (OUTPATIENT)
Dept: BARIATRICS/WEIGHT MGMT | Facility: CLINIC | Age: 65
End: 2025-05-16
Payer: COMMERCIAL

## 2025-05-16 VITALS — HEIGHT: 67 IN | WEIGHT: 233.9 LBS | BODY MASS INDEX: 36.71 KG/M2

## 2025-05-16 DIAGNOSIS — E66.812 OBESITY, CLASS II, BMI 35-39.9: Primary | ICD-10-CM

## 2025-05-16 NOTE — PROGRESS NOTES
Nutrition Services    Patient Name: Tirso Palomo  YOB: 1960  MRN: [id number]  Date of Service: 05/16/25      ICD-10-CM ICD-9-CM   1. Obesity, Class II, BMI 35-39.9  E66.812 278.00          NUTRITION ASSESSMENT - BARIATRIC SURGERY      Reason for Visit 2 year post op VSG, weight regain     H&P      Past Medical History:   Diagnosis Date    Allergic     Arthritis     Elevated cholesterol     Hiatal hernia     Hiatal hernia with GERD 12/04/2023    Hormone replacement therapy 6/2/2022    Hypertension     Hypothyroidism     Insomnia     Sciatica        Past Surgical History:   Procedure Laterality Date    BREAST SURGERY Right 2012    biopsy    COLONOSCOPY  2018    2018 2023    ENDOSCOPY N/A 02/09/2023    Procedure: ESOPHAGOGASTRODUODENOSCOPY with gastric biopsy;  Surgeon: Yue Jones MD;  Location: [facility] ENDOSCOPY;  Service: General;  Laterality: N/A;  normal    ENDOSCOPY N/A 11/30/2023    Procedure: ESOPHAGOGASTRODUODENOSCOPY with esophageal balloon dilation to 20mm;  Surgeon: Yue Jones MD;  Location: [facility] ENDOSCOPY;  Service: General;  Laterality: N/A;  stricture of gastric body    GASTRIC SLEEVE LAPAROSCOPIC N/A 03/16/2023    Procedure: GASTRIC SLEEVE LAPAROSCOPIC WITH DAVINCI ROBOT; HIATAL HERNIA REPAIR;  Surgeon: Yue Jones MD;  Location: [facility] MAIN OR;  Service: Robotics - [employer];  Laterality: N/A;    HIATAL HERNIA REPAIR N/A 12/6/2023    Procedure: LAPAROSCOPIC HIATAL HERNIA REPAIR; LIGAMENTUN TERES CARDIOPEXY WITH DAVINCI ROBOT;  Surgeon: Yue Jones MD;  Location: [facility] MAIN OR;  Service: Robotics - [employer];  Laterality: N/A;    TOENAIL EXCISION      01/2022    TUBAL ABDOMINAL LIGATION  1993        Previous Goals          Encounter Information        Visit Narrative     Patient reports she is still having issues with dysphagia. Patient states she still has some food come up and occasionally has liquids come up.     Patient states she has been stress eating recently causing  "some weight regain. Patient has been eating a diet high in carb and low in protein. Patient has been snacking at night. Encouraged patient to increase protein intake throughout the day. Patient to add protein to meals and snacks. Patient to also watch portion sizes on snacks at night. Patient planning to start exercising more.     Patient planning to follow up with APRN/Surgeon to discuss dysphagia and possible medications to help with weight loss.     Diet Recall:   Breakfast: skips usually, coffee with toast  Lunch: ramen noodles  Dinner: eats out often - chicken/turkey with sides (fries)  Snacks: cheese its at night  Beverages: juice, SF tea, sodas occasionally, coffee, buttermilk, water    Exercise: patient has been walking more. Patient had been unable to walk d/t leg pain    Supplements:     Self Monitoring:          Anthropometrics        Current Height, Weight Height: 170.2 cm (67\")  Weight: 106 kg (233 lb 14.4 oz) (05/16/25 1053)       Trending Weight Hx      Wt Readings from Last 30 Encounters:   05/16/25 1053 106 kg (233 lb 14.4 oz)   04/21/25 1005 106 kg (232 lb 12.8 oz)   03/20/25 1221 104 kg (228 lb 9.6 oz)   03/04/25 1203 102 kg (224 lb 9.6 oz)   02/03/25 1441 102 kg (225 lb)   11/22/24 1059 98.9 kg (218 lb)   09/12/24 0941 102 kg (225 lb 6.4 oz)   06/20/24 0842 103 kg (226 lb 12.8 oz)   05/29/24 1135 101 kg (223 lb 11.2 oz)   05/06/24 1207 102 kg (225 lb 3.2 oz)   02/12/24 1504 99.7 kg (219 lb 12.8 oz)   01/08/24 1211 99.4 kg (219 lb 3.2 oz)   12/15/23 1347 97.2 kg (214 lb 3.2 oz)   12/11/23 1117 97.7 kg (215 lb 6.4 oz)   12/06/23 1304 98.9 kg (218 lb)   12/04/23 0856 99.7 kg (219 lb 12.8 oz)   11/30/23 1243 100 kg (221 lb 1.9 oz)   11/27/23 1121 99.3 kg (219 lb)   11/27/23 1027 99.3 kg (219 lb)   10/19/23 0928 98.3 kg (216 lb 12.8 oz)   09/29/23 0950 98.2 kg (216 lb 9.6 oz)   07/05/23 0942 98.9 kg (218 lb)   06/21/23 0949 98 kg (216 lb)   06/05/23 1031 99 kg (218 lb 3.2 oz)   04/19/23 1417 102 kg " (223 lb 12.8 oz)   03/30/23 1542 105 kg (231 lb 9.6 oz)   03/20/23 1312 108 kg (237 lb 9.6 oz)   03/16/23 0610 109 kg (239 lb 9.6 oz)   03/06/23 1606 111 kg (245 lb)   03/10/23 0959 111 kg (245 lb 3.2 oz)   02/09/23 1114 111 kg (244 lb 11.4 oz)   02/02/23 1244 112 kg (246 lb)   02/02/23 0756 113 kg (248 lb 12.8 oz)      BMI kg/m2 Body mass index is 36.63 kg/m².       Nutrition Diagnosis         Nutrition Dx Statement Overweight/obesity RT multifactorial biochemical, behavioral and environmental contributors to disease AEB BMI 36.63 kg/m^2         Nutrition Intervention         Nutrition Intervention Nutrition education related to diet modification and physical activity        Monitor/Evaluation        New Goals Patient to increase protein intake   Patient to add in walking  Patient to change snacks at night and monitor portions       Total time spent with pt 30 minutes of which 30 minutes were spent on education.       Electronically signed by:  Becky Guardado RD  05/16/25 12:34 EDT

## 2025-06-04 RX ORDER — OMEPRAZOLE 40 MG/1
40 CAPSULE, DELAYED RELEASE ORAL DAILY
Qty: 90 CAPSULE | Refills: 2 | Status: SHIPPED | OUTPATIENT
Start: 2025-06-04

## 2025-06-17 NOTE — PROGRESS NOTES
Subjective   Tirso Palomo is a 64 y.o. female. Presents to [facility]    Chief Complaint   Patient presents with    Hypertension    Hyperlipidemia    Hypothyroidism       Hypertension  This is a chronic problem. The current episode started more than 1 month ago. The problem is controlled. Associated symptoms include headaches (from metoprolol). Pertinent negatives include no chest pain, malaise/fatigue, palpitations or shortness of breath. Risk factors for coronary artery disease include obesity and dyslipidemia. Current antihypertension treatment includes angiotensin blockers and beta blockers. The current treatment provides mild improvement.   Hyperlipidemia  This is a chronic problem. The current episode started more than 1 year ago. Exacerbating diseases include hypothyroidism and obesity. She has no history of diabetes. Pertinent negatives include no chest pain or shortness of breath. Current antihyperlipidemic treatment includes statins. The current treatment provides moderate improvement of lipids. Risk factors for coronary artery disease include dyslipidemia, hypertension, post-menopausal and obesity.   Hypothyroidism  Presents for follow-up visit. Patient reports no depressed mood, dry skin, fatigue, leg swelling or palpitations. The symptoms have been stable. Past treatments include levothyroxine. Her past medical history is significant for hyperlipidemia. There is no history of diabetes.        I personally reviewed and updated the patient's allergies, medications, problem list, and past medical, surgical, social, and family history. I have reviewed and confirmed the accuracy of the History of Present Illness and Review of Symptoms as documented by the MA/LPN/RN. Miryam Martinez MD    Allergies:  Allergies   Allergen Reactions    Benadryl [Diphenhydramine] Other (See Comments)     Makes her too sleepy    Lisinopril Cough    Topamax [Topiramate] GI Intolerance    Actigall [Ursodiol]  Other (See Comments)     Burning in lining of stomach     Amlodipine Palpitations       Social History:  Social History     Socioeconomic History    Marital status:    Tobacco Use    Smoking status: Former     Current packs/day: 0.00     Average packs/day: 0.3 packs/day for 30.0 years (7.5 ttl pk-yrs)     Types: Cigarettes     Start date: 1990     Quit date: 2010     Years since quittin.9     Passive exposure: Past    Smokeless tobacco: Never    Tobacco comments:     Quit cold turkey   Vaping Use    Vaping status: Never Used   Substance and Sexual Activity    Alcohol use: No    Drug use: No    Sexual activity: Yes     Partners: Male     Birth control/protection: Post-menopausal       Family History:  Family History   Problem Relation Age of Onset    Breast cancer Mother     Cancer Mother         breast    Hypertension Father     Heart attack Father     Cancer Father         prostate    Obesity Brother     Hypertension Brother     Arthritis Brother     Heart disease Maternal Grandmother     Diabetes Maternal Grandmother     Cancer Paternal Grandmother     Breast cancer Maternal Aunt     Obesity Maternal Grandfather     Stroke Maternal Grandfather     Hypertension Maternal Grandfather     Stroke Paternal Grandfather     Heart attack Paternal Grandfather     Hypertension Paternal Grandfather        Past Medical History :  Patient Active Problem List   Diagnosis    Chronic fatigue    Essential hypertension    Snoring    Hemorrhoids    Multiple joint pain    Acquired hypothyroidism    Screening mammogram, encounter for    Screening for cervical cancer    Screening for colon cancer    Encounter for general adult medical examination with abnormal findings    Vitamin D deficiency    Urinary incontinence    Stress incontinence of urine    Spondylosis of lumbosacral spine without myelopathy    Class 2 severe obesity due to excess calories with serious comorbidity and body mass index (BMI) of 37.0 to 37.9  in adult    Menopause present    Hypermobility of urethra    History of colonic polyps    Female bladder prolapse    Abscess of right breast    Normal pelvic exam    Epidermal inclusion cyst    Status post sleeve gastrectomy    Chronic idiopathic constipation    History of tobacco use    Vaginal dryness    Alopecia    Change in bowel habit    Family history of colon cancer    H/O bariatric surgery    Hiatal hernia with GERD    Influenza vaccine needed    Mixed hyperlipidemia    Numerous moles    Cervical radiculopathy    Lumbar radiculopathy    Spasm of muscle of lower back    Skin lesion of chest wall    Left hip pain    Iliotibial band syndrome of left side    Chronic arthritis    Iron deficiency anemia after gastrectomy    H/O gastric sleeve    Secondary hyperparathyroidism    Family history of cancer       Medication List:    Current Outpatient Medications:     Calcium-Vitamin D-Vitamin K (CALCIUM SOFT CHEWS PO), Take  by mouth., Disp: , Rfl:     celecoxib (CeleBREX) 200 MG capsule, Take 1 capsule by mouth Daily., Disp: 30 capsule, Rfl: 5    famotidine (PEPCID) 10 MG tablet, Take 1 tablet by mouth 2 (Two) Times a Day. (Patient taking differently: Take 1 tablet by mouth As Needed.), Disp: , Rfl:     ferrous sulfate 325 (65 FE) MG tablet, TAKE 1 TABLET BY MOUTH EVERY DAY WITH BREAKFAST (Patient taking differently: Take 1 tablet by mouth As Needed.), Disp: 90 tablet, Rfl: 0    levothyroxine (SYNTHROID, LEVOTHROID) 75 MCG tablet, TAKE 1 TABLET BY MOUTH EVERY DAY, Disp: 90 tablet, Rfl: 1    losartan (COZAAR) 100 MG tablet, Take 0.5 tablets by mouth 2 (Two) Times a Day., Disp: 90 tablet, Rfl: 3    metoprolol succinate XL (TOPROL-XL) 25 MG 24 hr tablet, Take 1 tablet by mouth Daily., Disp: 30 tablet, Rfl: 12    Minoxidil 5 % foam, Apply 1 application topically As Needed., Disp: , Rfl:     multivitamin with minerals tablet tablet, Take 1 tablet by mouth Daily. Bariatric, Disp: , Rfl:     omeprazole (priLOSEC) 40 MG  capsule, TAKE 1 CAPSULE BY MOUTH EVERY DAY, Disp: 90 capsule, Rfl: 2    polyethylene glycol (MiraLax) 17 GM/SCOOP powder, mix 1 capful (17 grams) in liquid and drink by mouth daily (Patient taking differently: 17 g Daily. Pt taking on occation), Disp: 510 g, Rfl: 1    pravastatin (PRAVACHOL) 10 MG tablet, Take 1 tablet by mouth Every Night., Disp: 30 tablet, Rfl: 12    Tirzepatide-Weight Management (Zepbound) 2.5 MG/0.5ML solution, Inject 0.5 mL under the skin into the appropriate area as directed 1 (One) Time Per Week., Disp: 2 mL, Rfl: 0    Xiidra 5 % ophthalmic solution, instill 1 drop into both eyes twice a day, Disp: , Rfl:     Past Surgical History:  Past Surgical History:   Procedure Laterality Date    BARIATRIC SURGERY  3/16/2023    All is good    BREAST SURGERY Right 2012    biopsy    COLONOSCOPY  11/2023 2018 2023    ENDOSCOPY N/A 02/09/2023    Procedure: ESOPHAGOGASTRODUODENOSCOPY with gastric biopsy;  Surgeon: Yue Jones MD;  Location: [facility] ENDOSCOPY;  Service: General;  Laterality: N/A;  normal    ENDOSCOPY N/A 11/30/2023    Procedure: ESOPHAGOGASTRODUODENOSCOPY with esophageal balloon dilation to 20mm;  Surgeon: Yue Jones MD;  Location: [facility] ENDOSCOPY;  Service: General;  Laterality: N/A;  stricture of gastric body    GASTRIC SLEEVE LAPAROSCOPIC N/A 03/16/2023    Procedure: GASTRIC SLEEVE LAPAROSCOPIC WITH DAVINCI ROBOT; HIATAL HERNIA REPAIR;  Surgeon: Yue Jones MD;  Location: [facility] MAIN OR;  Service: Robotics - [employer];  Laterality: N/A;    HIATAL HERNIA REPAIR N/A 12/06/2023    Procedure: LAPAROSCOPIC HIATAL HERNIA REPAIR; LIGAMENTUN TERES CARDIOPEXY WITH DAVINCI ROBOT;  Surgeon: Yue Jones MD;  Location: [facility] MAIN OR;  Service: Robotics - [employer];  Laterality: N/A;    TOENAIL EXCISION      01/2022    TUBAL ABDOMINAL LIGATION  1993         Physical Exam:      Vital Signs:    Vitals:    06/20/25 1042   BP: 138/62   Pulse:    Resp:    Temp:    SpO2:         /62 (BP  "Location: Left arm, Patient Position: Sitting, Cuff Size: Adult)   Pulse 77   Temp 97.6 °F (36.4 °C) (Temporal)   Resp 18   Ht 170.2 cm (67\")   Wt 108 kg (238 lb 6.4 oz)   LMP  (LMP Unknown)   SpO2 98% Comment: ra  BMI 37.34 kg/m²     Wt Readings from Last 3 Encounters:   06/20/25 108 kg (238 lb 6.4 oz)   06/19/25 107 kg (235 lb 12.8 oz)   05/16/25 106 kg (233 lb 14.4 oz)       Result Review :                Physical Exam  Vitals reviewed.   Constitutional:       Appearance: Normal appearance. She is well-developed.   HENT:      Head: Normocephalic and atraumatic.   Eyes:      General:         Right eye: No discharge.         Left eye: No discharge.   Cardiovascular:      Rate and Rhythm: Normal rate and regular rhythm.      Heart sounds: Normal heart sounds. No murmur heard.     No friction rub. No gallop.   Pulmonary:      Effort: Pulmonary effort is normal. No respiratory distress.      Breath sounds: Normal breath sounds. No wheezing or rales.   Skin:     General: Skin is warm and dry.      Findings: No rash.   Neurological:      Mental Status: She is alert and oriented to person, place, and time.      Coordination: Coordination normal.      Gait: Gait normal.   Psychiatric:         Behavior: Behavior is cooperative.         Assessment and Plan:  Problems Addressed this Visit          Cardiac and Vasculature    Essential hypertension - Primary    Hypertension is stable and controlled  Continue current treatment regimen.  Blood pressure will be reassessed in 3 months.         Relevant Orders    Comprehensive Metabolic Panel    Mixed hyperlipidemia     She is on pravastatin  Continue current treatment  Check labs         Relevant Orders    Lipid Panel With / Chol / HDL Ratio       Endocrine and Metabolic    Acquired hypothyroidism    Class 2 severe obesity due to excess calories with serious comorbidity and body mass index (BMI) of 37.0 to 37.9 in adult    Patient's (Body mass index is 37.34 kg/m².) " indicates that they are morbidly/severely obese (BMI > 40 or > 35 with obesity - related health condition) with health conditions that include hypertension and dyslipidemias . Weight is worsening. BMI  is above average; BMI management plan is completed. We discussed low calorie, low carb based diet program, portion control, and increasing exercise.          H/O bariatric surgery     Diagnoses         Codes Comments      Essential hypertension    -  Primary ICD-10-CM: I10  ICD-9-CM: 401.9       Mixed hyperlipidemia     ICD-10-CM: E78.2  ICD-9-CM: 272.2       Acquired hypothyroidism     ICD-10-CM: E03.9  ICD-9-CM: 244.9       Class 2 severe obesity due to excess calories with serious comorbidity and body mass index (BMI) of 37.0 to 37.9 in adult     ICD-10-CM: E66.812, E66.01, Z68.37  ICD-9-CM: 278.01, V85.37       H/O bariatric surgery     ICD-10-CM: Z98.84  ICD-9-CM: V45.86                          An After Visit Summary and PPPS were given to the patient.       This document is intended for medical expert use only. Reading of this document by patients and/or patient's family without participating medical staff guidance may result in misinterpretation and unintended morbidity. Any interpretation of such data is the responsibility of the patient and/or family member responsible for the patient in concert with their primary or specialist providers, not to be left for sources of online searches such as [employer], [employer] or similar queries. Relying on these approaches to knowledge may result in misinterpretation, misguided goals of care and even death should patients or family members try recommendations outside of the realm of professional medical care.

## 2025-06-19 ENCOUNTER — OFFICE VISIT (OUTPATIENT)
Dept: BARIATRICS/WEIGHT MGMT | Facility: CLINIC | Age: 65
End: 2025-06-19
Payer: COMMERCIAL

## 2025-06-19 VITALS
SYSTOLIC BLOOD PRESSURE: 160 MMHG | DIASTOLIC BLOOD PRESSURE: 84 MMHG | OXYGEN SATURATION: 96 % | HEART RATE: 76 BPM | BODY MASS INDEX: 37.01 KG/M2 | WEIGHT: 235.8 LBS | HEIGHT: 67 IN

## 2025-06-19 DIAGNOSIS — Z79.899 MEDICATION MANAGEMENT: ICD-10-CM

## 2025-06-19 DIAGNOSIS — Z90.3 H/O GASTRIC SLEEVE: ICD-10-CM

## 2025-06-19 DIAGNOSIS — E66.812 OBESITY, CLASS II, BMI 35-39.9: Primary | ICD-10-CM

## 2025-06-19 PROCEDURE — 1159F MED LIST DOCD IN RCRD: CPT | Performed by: NURSE PRACTITIONER

## 2025-06-19 PROCEDURE — 99214 OFFICE O/P EST MOD 30 MIN: CPT | Performed by: NURSE PRACTITIONER

## 2025-06-19 PROCEDURE — 3077F SYST BP >= 140 MM HG: CPT | Performed by: NURSE PRACTITIONER

## 2025-06-19 PROCEDURE — 1160F RVW MEDS BY RX/DR IN RCRD: CPT | Performed by: NURSE PRACTITIONER

## 2025-06-19 PROCEDURE — 3079F DIAST BP 80-89 MM HG: CPT | Performed by: NURSE PRACTITIONER

## 2025-06-19 RX ORDER — TIRZEPATIDE 2.5 MG/.5ML
2.5 INJECTION, SOLUTION SUBCUTANEOUS WEEKLY
Qty: 2 ML | Refills: 0 | Status: SHIPPED | OUTPATIENT
Start: 2025-06-19

## 2025-06-19 NOTE — PROGRESS NOTES
"[facility]  [address] [place] [address]  [address] [place] [address]  Dept: [phone]  6/19/2025      Tirso Palomo.  [id number]  [id number]  1960  female    Date of last surgery: 12/6/2023Laparoscopic Hiatal Hernia Repair; Ligamentun Teres Cardiopexy With [employer] Robot      Chief Complaint: BH Post-Op Bariatric Surgery:   Tirso Palomo is status post procedure listed above  HPI:     Wt Readings from Last 10 Encounters:   06/19/25 107 kg (235 lb 12.8 oz)   05/16/25 106 kg (233 lb 14.4 oz)   04/21/25 106 kg (232 lb 12.8 oz)   03/20/25 104 kg (228 lb 9.6 oz)   03/04/25 102 kg (224 lb 9.6 oz)   02/03/25 102 kg (225 lb)   11/22/24 98.9 kg (218 lb)   09/12/24 102 kg (225 lb 6.4 oz)   06/20/24 103 kg (226 lb 12.8 oz)   05/29/24 101 kg (223 lb 11.2 oz)        Today's weight is 107 kg (235 lb 12.8 oz) pounds,BMI 36.93 has a  gain of 2 pounds since the last visit and  weight loss since surgery is 10 pounds. The patient reports a decreased portion size and loss of appetite.      Tirso Palomo denies nausea and vomiting     Diet and Exercise: Diet history reviewed and discussed with the patient. Weight loss/gains to date discussed with the patient.     She reports eating 3 meals per day, a typical portion size of 1 cup spaghetti, eating 2-3 snacks per day, drinking 8 or more 8-oz. glasses of water per day, no carbonated beverage consumption and exercising - trying to get back to power walking      The patient states they are eating 40 grams of protein per day.     Breakfast: SF toast and olive oil butter   Lunch: eating out sometimes   Dinner: eating out some but also cooking at home  String cheese in evening   Grazing in between meals , no hunger   Drinks:coffee, buttermilk, water , juices, bindu unsweet tea   Exercise: \" did something to her leg,\" trying to get back to power walking again      Review of Systems "   Constitutional:  Positive for activity change and appetite change.   Respiratory: Negative.     Cardiovascular: Negative.    Gastrointestinal:         Dysphagia at times    Musculoskeletal:         Hip/ leg pain but improved        Patient Active Problem List   Diagnosis   • Chronic fatigue   • Essential hypertension   • Snoring   • Hemorrhoids   • Multiple joint pain   • Acquired hypothyroidism   • Screening mammogram, encounter for   • Screening for cervical cancer   • Screening for colon cancer   • Encounter for general adult medical examination with abnormal findings   • Vitamin D deficiency   • Urinary incontinence   • Stress incontinence of urine   • Spondylosis of lumbosacral spine without myelopathy   • Class 2 severe obesity due to excess calories with serious comorbidity and body mass index (BMI) of 36.0 to 36.9 in adult   • Menopause present   • Hypermobility of urethra   • History of colonic polyps   • Female bladder prolapse   • Abscess of right breast   • Normal pelvic exam   • Epidermal inclusion cyst   • Status post sleeve gastrectomy   • Chronic idiopathic constipation   • History of tobacco use   • Vaginal dryness   • Alopecia   • Change in bowel habit   • Family history of colon cancer   • H/O bariatric surgery   • Hiatal hernia with GERD   • Influenza vaccine needed   • Mixed hyperlipidemia   • Numerous moles   • Cervical radiculopathy   • Lumbar radiculopathy   • Spasm of muscle of lower back   • Skin lesion of chest wall   • Left hip pain   • Iliotibial band syndrome of left side   • Chronic arthritis   • Iron deficiency anemia after gastrectomy   • H/O gastric sleeve   • Secondary hyperparathyroidism   • Family history of cancer       Past Medical History:   Diagnosis Date   • Allergic    • Arthritis    • Elevated cholesterol    • Hiatal hernia    • Hiatal hernia with GERD 12/04/2023   • Hormone replacement therapy 6/2/2022   • Hypertension    • Hypothyroidism    • Insomnia    • Sciatica       Past Surgical History:   Procedure Laterality Date   • TUBAL ABDOMINAL LIGATION  1993   • BREAST SURGERY Right 2012    biopsy   • ENDOSCOPY N/A 02/09/2023    Procedure: ESOPHAGOGASTRODUODENOSCOPY with gastric biopsy;  Surgeon: Yue Jones MD;  Location: [facility] ENDOSCOPY;  Service: General;  Laterality: N/A;  normal   • GASTRIC SLEEVE LAPAROSCOPIC N/A 03/16/2023    Procedure: GASTRIC SLEEVE LAPAROSCOPIC WITH DAVINCI ROBOT; HIATAL HERNIA REPAIR;  Surgeon: Yue Jones MD;  Location: [facility] MAIN OR;  Service: Robotics - [employer];  Laterality: N/A;   • ENDOSCOPY N/A 11/30/2023    Procedure: ESOPHAGOGASTRODUODENOSCOPY with esophageal balloon dilation to 20mm;  Surgeon: Yue Jones MD;  Location: [facility] ENDOSCOPY;  Service: General;  Laterality: N/A;  stricture of gastric body   • HIATAL HERNIA REPAIR N/A 12/06/2023    Procedure: LAPAROSCOPIC HIATAL HERNIA REPAIR; LIGAMENTUN TERES CARDIOPEXY WITH DAVINCI ROBOT;  Surgeon: Yue Jones MD;  Location: [facility] MAIN OR;  Service: Robotics - [employer];  Laterality: N/A;   • BARIATRIC SURGERY  3/16/2023    All is good   • COLONOSCOPY  11/2023 2018 2023   • TOENAIL EXCISION      01/2022      The following portions of the patient's history were reviewed and updated as appropriate: allergies, current medications, past medical history, past social history, past surgical history, and problem list.    Vitals:    06/19/25 0845   BP: 160/84   Pulse: 76   SpO2: 96%       Physical Exam  Constitutional:       Appearance: Normal appearance. She is obese.   Pulmonary:      Effort: Pulmonary effort is normal.   Abdominal:      General: Abdomen is flat.   Skin:     General: Skin is warm.   Neurological:      General: No focal deficit present.      Mental Status: She is alert and oriented to person, place, and time.   Psychiatric:         Mood and Affect: Mood normal.         Behavior: Behavior normal.         Thought Content: Thought content normal.         Judgment: Judgment normal.          Assessment:   BMI 36.93, class 2 obesity, zepbound    Post-op, the patient is struggling with weight regain. She has been going through a lot personally with her mother being diagnosed with cancer, her grandson having health problems with his heart , and going though an injury with her hip/ leg. She also reports to be grazing/ snacking more. Denies hunger though. She is struggling to get her protein in. She did meet with RD recently to discuss dietary changes and protein recommendations. Her leg /hip is now better. She is working on trying to get back to power walking again.     Pt is interested in trying weight loss medications to help with possible weight loss and snacking/ grazing. She reports doing ozempic and wegovy in the past before having bariatric surgery. Reports doing well with ozempic ( since she could adjust the dose if needed) but having constipation with wegovy. Reports stopping the medication after having a lot of constipation. She reports doing this with an online company around 3 years ago. Pt is interested in trying zepbound vials. She denies hx of medullary thyroid cancer/ multiple endocrine neoplasia type 2/ gastroparesis/ pancreatitis. Insurance unfortunately will not cover weight loss medications. Will prescribe zepbound 2.5 mg vial through [employer] direct. Monitor for nausea/ vomiting/ abd pain/ constipation and GERD. Follow up in 1 month for med check.    Pt is still having some epigastric pain when eating. Not consistently but sporadically. Pt did have an upper GI 7/24 and showed possible esophageal dysmotility. No recurrent hiatal hernia. Offered to schedule pt for an EGD but pt would like to wait another month or so and see if symptoms get worse and if so, will plan for EGD then. Will reassess at next visit in 1 month.     Plan:     Encouraged patient to be sure to get plenty of lean protein per day through small frequent meals all with a protein source.   Activity restrictions:  none.   Recommended patient be sure to get at least 70 grams of protein per day by eating small, frequent meals all with high lean protein choices. Be sure to limit/cut back on daily carbohydrate intake. Discussed with the patient the recommended amount of water per day to intake- half of body weight in ounces. Reviewed vitamin requirements. Be sure to do routine exercise, 150 minutes per week minimum, including both cardio and strength training.     Instructions / Recommendations: dietary counseling recommended, recommended a daily protein intake of  grams, vitamin supplement(s) recommended, recommended exercising at least 150 minutes per week, behavior modifications recommended and instructed to call the office for concerns, questions, or problems.     The patient was instructed to follow up in 1 month.     The patient was counseled regarding diet and exercise/ zepbound. Total time spent face to face was 30 minutes and 15 minutes was spent counseling.     DARINEL Pompa  [facility]

## 2025-06-20 ENCOUNTER — OFFICE VISIT (OUTPATIENT)
Dept: FAMILY MEDICINE CLINIC | Facility: CLINIC | Age: 65
End: 2025-06-20
Payer: COMMERCIAL

## 2025-06-20 VITALS
HEART RATE: 77 BPM | BODY MASS INDEX: 37.42 KG/M2 | OXYGEN SATURATION: 98 % | RESPIRATION RATE: 18 BRPM | TEMPERATURE: 97.6 F | WEIGHT: 238.4 LBS | SYSTOLIC BLOOD PRESSURE: 138 MMHG | HEIGHT: 67 IN | DIASTOLIC BLOOD PRESSURE: 62 MMHG

## 2025-06-20 DIAGNOSIS — E78.2 MIXED HYPERLIPIDEMIA: ICD-10-CM

## 2025-06-20 DIAGNOSIS — Z98.84 H/O BARIATRIC SURGERY: ICD-10-CM

## 2025-06-20 DIAGNOSIS — I10 ESSENTIAL HYPERTENSION: Primary | ICD-10-CM

## 2025-06-20 DIAGNOSIS — E66.812 CLASS 2 SEVERE OBESITY DUE TO EXCESS CALORIES WITH SERIOUS COMORBIDITY AND BODY MASS INDEX (BMI) OF 37.0 TO 37.9 IN ADULT: ICD-10-CM

## 2025-06-20 DIAGNOSIS — E03.9 ACQUIRED HYPOTHYROIDISM: ICD-10-CM

## 2025-06-20 DIAGNOSIS — E66.01 CLASS 2 SEVERE OBESITY DUE TO EXCESS CALORIES WITH SERIOUS COMORBIDITY AND BODY MASS INDEX (BMI) OF 37.0 TO 37.9 IN ADULT: ICD-10-CM

## 2025-06-20 PROCEDURE — 1126F AMNT PAIN NOTED NONE PRSNT: CPT | Performed by: FAMILY MEDICINE

## 2025-06-20 PROCEDURE — 1159F MED LIST DOCD IN RCRD: CPT | Performed by: FAMILY MEDICINE

## 2025-06-20 PROCEDURE — 3075F SYST BP GE 130 - 139MM HG: CPT | Performed by: FAMILY MEDICINE

## 2025-06-20 PROCEDURE — G2211 COMPLEX E/M VISIT ADD ON: HCPCS | Performed by: FAMILY MEDICINE

## 2025-06-20 PROCEDURE — 1160F RVW MEDS BY RX/DR IN RCRD: CPT | Performed by: FAMILY MEDICINE

## 2025-06-20 PROCEDURE — 3078F DIAST BP <80 MM HG: CPT | Performed by: FAMILY MEDICINE

## 2025-06-20 PROCEDURE — 99214 OFFICE O/P EST MOD 30 MIN: CPT | Performed by: FAMILY MEDICINE

## 2025-06-24 ENCOUNTER — PATIENT MESSAGE (OUTPATIENT)
Dept: FAMILY MEDICINE CLINIC | Facility: CLINIC | Age: 65
End: 2025-06-24
Payer: COMMERCIAL

## 2025-06-24 DIAGNOSIS — Z80.9 FAMILY HISTORY OF CANCER: Primary | ICD-10-CM

## 2025-06-30 NOTE — ASSESSMENT & PLAN NOTE
Patient's (Body mass index is 37.34 kg/m².) indicates that they are morbidly/severely obese (BMI > 40 or > 35 with obesity - related health condition) with health conditions that include hypertension and dyslipidemias . Weight is worsening. BMI  is above average; BMI management plan is completed. We discussed low calorie, low carb based diet program, portion control, and increasing exercise.

## 2025-07-14 RX ORDER — FERROUS SULFATE 325(65) MG
1 TABLET ORAL
Qty: 90 TABLET | Refills: 0 | Status: SHIPPED | OUTPATIENT
Start: 2025-07-14

## 2025-07-16 RX ORDER — TIRZEPATIDE 2.5 MG/.5ML
2.5 INJECTION, SOLUTION SUBCUTANEOUS WEEKLY
Qty: 2 ML | Refills: 2 | Status: SHIPPED | OUTPATIENT
Start: 2025-07-16

## 2025-07-22 LAB
ALBUMIN SERPL-MCNC: 4.6 G/DL (ref 3.9–4.9)
ALP SERPL-CCNC: 105 IU/L (ref 44–121)
ALT SERPL-CCNC: 20 IU/L (ref 0–32)
AST SERPL-CCNC: 19 IU/L (ref 0–40)
BILIRUB SERPL-MCNC: 0.4 MG/DL (ref 0–1.2)
BUN SERPL-MCNC: 9 MG/DL (ref 8–27)
BUN/CREAT SERPL: 10 (ref 12–28)
CALCIUM SERPL-MCNC: 10.6 MG/DL (ref 8.7–10.3)
CHLORIDE SERPL-SCNC: 104 MMOL/L (ref 96–106)
CHOLEST SERPL-MCNC: 192 MG/DL (ref 100–199)
CHOLEST/HDLC SERPL: 3 RATIO (ref 0–4.4)
CO2 SERPL-SCNC: 24 MMOL/L (ref 20–29)
CREAT SERPL-MCNC: 0.87 MG/DL (ref 0.57–1)
EGFRCR SERPLBLD CKD-EPI 2021: 73 ML/MIN/1.73
GLOBULIN SER CALC-MCNC: 2.6 G/DL (ref 1.5–4.5)
GLUCOSE SERPL-MCNC: 84 MG/DL (ref 70–99)
HDLC SERPL-MCNC: 63 MG/DL
LDLC SERPL CALC-MCNC: 103 MG/DL (ref 0–99)
POTASSIUM SERPL-SCNC: 5 MMOL/L (ref 3.5–5.2)
PROT SERPL-MCNC: 7.2 G/DL (ref 6–8.5)
SODIUM SERPL-SCNC: 141 MMOL/L (ref 134–144)
TRIGL SERPL-MCNC: 149 MG/DL (ref 0–149)
VLDLC SERPL CALC-MCNC: 26 MG/DL (ref 5–40)

## 2025-07-28 ENCOUNTER — OFFICE VISIT (OUTPATIENT)
Dept: BARIATRICS/WEIGHT MGMT | Facility: CLINIC | Age: 65
End: 2025-07-28
Payer: COMMERCIAL

## 2025-07-28 VITALS
BODY MASS INDEX: 35.64 KG/M2 | HEART RATE: 70 BPM | HEIGHT: 67 IN | WEIGHT: 227.1 LBS | RESPIRATION RATE: 18 BRPM | SYSTOLIC BLOOD PRESSURE: 150 MMHG | OXYGEN SATURATION: 97 % | DIASTOLIC BLOOD PRESSURE: 96 MMHG

## 2025-07-28 DIAGNOSIS — E66.812 OBESITY, CLASS II, BMI 35-39.9: Primary | ICD-10-CM

## 2025-07-28 DIAGNOSIS — Z79.899 MEDICATION MANAGEMENT: ICD-10-CM

## 2025-07-28 DIAGNOSIS — R13.10 DYSPHAGIA, UNSPECIFIED TYPE: ICD-10-CM

## 2025-07-28 DIAGNOSIS — Z90.3 H/O GASTRIC SLEEVE: ICD-10-CM

## 2025-07-28 PROCEDURE — 1159F MED LIST DOCD IN RCRD: CPT | Performed by: NURSE PRACTITIONER

## 2025-07-28 PROCEDURE — 1160F RVW MEDS BY RX/DR IN RCRD: CPT | Performed by: NURSE PRACTITIONER

## 2025-07-28 PROCEDURE — 99213 OFFICE O/P EST LOW 20 MIN: CPT | Performed by: NURSE PRACTITIONER

## 2025-07-28 PROCEDURE — 3080F DIAST BP >= 90 MM HG: CPT | Performed by: NURSE PRACTITIONER

## 2025-07-28 PROCEDURE — 3077F SYST BP >= 140 MM HG: CPT | Performed by: NURSE PRACTITIONER

## 2025-07-28 NOTE — PROGRESS NOTES
MGK BAR SURG [facility] BARIATRIC SURGERY  [address] [place] [address]  [address] [place] [address]  Dept: [phone]  7/28/2025      Tirso Palomo.  [id number]  [id number]  1960  female    Date of last surgery: 12/6/2023Laparoscopic Hiatal Hernia Repair; Ligamentun Teres Cardiopexy With Davinci Robot      Chief Complaint: BH Post-Op Bariatric Surgery:   Tirso Palomo is status post procedure listed above  HPI:     Wt Readings from Last 10 Encounters:   07/28/25 103 kg (227 lb 1.6 oz)   06/20/25 108 kg (238 lb 6.4 oz)   06/19/25 107 kg (235 lb 12.8 oz)   05/16/25 106 kg (233 lb 14.4 oz)   04/21/25 106 kg (232 lb 12.8 oz)   03/20/25 104 kg (228 lb 9.6 oz)   03/04/25 102 kg (224 lb 9.6 oz)   02/03/25 102 kg (225 lb)   11/22/24 98.9 kg (218 lb)   09/12/24 102 kg (225 lb 6.4 oz)    First Goal weight 215 pounds    Today's weight is 103 kg (227 lb 1.6 oz) pounds,BMI 35.57 has a  loss of 8 pounds since the last visit and  weight loss since surgery is 28 pounds. The patient reports a decreased portion size and loss of appetite.      Tirso Palomo reportsconstipation     Diet and Exercise: Diet history reviewed and discussed with the patient. Weight loss/gains to date discussed with the patient.     She reports eating 3 meals per day, a typical portion size of 1/2-1 cup, eating 1 snacks per day, drinking 8 or more 8-oz. glasses of water per day, some carbonated beverage consumption and exercising regularly.       The patient states they are eating 40-50 grams of protein per day.       Some constipation but taking laxatives   1 episode of GERD  No nausea or vomiting    2.5 mg weekly zepbound vials through [facility]- helping with hunger, snacking and portion size     Breakfast: piece of toast egg, 1 piece of wyatt  Lunch: 1/2 subway sandwich , nella noodles, veggies from garden , soup   Dinner: varies - ground turkey or ground chicken, fried  potatoes   Snacks: cheese crackers , cheese sticks, olives   Drinks:  fruit juice , diet lemon lime , some carbonation , prime hydration water , coffee black , lemonade   Exercise: walking in the pool , resistance training in the pool     Multi vitamin     Protein shakes prn     Review of Systems   Constitutional:  Positive for activity change and appetite change.   Respiratory: Negative.     Cardiovascular: Negative.    Gastrointestinal:  Positive for constipation.   Musculoskeletal: Negative.          Patient Active Problem List   Diagnosis    Chronic fatigue    Essential hypertension    Snoring    Hemorrhoids    Multiple joint pain    Acquired hypothyroidism    Screening mammogram, encounter for    Screening for cervical cancer    Screening for colon cancer    Encounter for general adult medical examination with abnormal findings    Vitamin D deficiency    Urinary incontinence    Stress incontinence of urine    Spondylosis of lumbosacral spine without myelopathy    Class 2 severe obesity due to excess calories with serious comorbidity and body mass index (BMI) of 37.0 to 37.9 in adult    Menopause present    Hypermobility of urethra    History of colonic polyps    Female bladder prolapse    Abscess of right breast    Normal pelvic exam    Epidermal inclusion cyst    Status post sleeve gastrectomy    Chronic idiopathic constipation    History of tobacco use    Vaginal dryness    Alopecia    Change in bowel habit    Family history of colon cancer    H/O bariatric surgery    Hiatal hernia with GERD    Influenza vaccine needed    Mixed hyperlipidemia    Numerous moles    Cervical radiculopathy    Lumbar radiculopathy    Spasm of muscle of lower back    Skin lesion of chest wall    Left hip pain    Iliotibial band syndrome of left side    Chronic arthritis    Iron deficiency anemia after gastrectomy    H/O gastric sleeve    Secondary hyperparathyroidism    Family history of cancer       Past Medical History:    Diagnosis Date    Allergic     Arthritis     Elevated cholesterol     Hiatal hernia     Hiatal hernia with GERD 12/04/2023    Hormone replacement therapy 6/2/2022    Hypertension     Hypothyroidism     Insomnia     Sciatica      Past Surgical History:   Procedure Laterality Date    TUBAL ABDOMINAL LIGATION  1993    BREAST SURGERY Right 2012    biopsy    ENDOSCOPY N/A 02/09/2023    Procedure: ESOPHAGOGASTRODUODENOSCOPY with gastric biopsy;  Surgeon: Yue Jones MD;  Location: [facility] ENDOSCOPY;  Service: General;  Laterality: N/A;  normal    GASTRIC SLEEVE LAPAROSCOPIC N/A 03/16/2023    Procedure: GASTRIC SLEEVE LAPAROSCOPIC WITH DAVINCI ROBOT; HIATAL HERNIA REPAIR;  Surgeon: Yue Jones MD;  Location: [facility] MAIN OR;  Service: Robotics - DaVinci;  Laterality: N/A;    ENDOSCOPY N/A 11/30/2023    Procedure: ESOPHAGOGASTRODUODENOSCOPY with esophageal balloon dilation to 20mm;  Surgeon: Yue Jones MD;  Location: [facility] ENDOSCOPY;  Service: General;  Laterality: N/A;  stricture of gastric body    HIATAL HERNIA REPAIR N/A 12/06/2023    Procedure: LAPAROSCOPIC HIATAL HERNIA REPAIR; LIGAMENTUN TERES CARDIOPEXY WITH DAVINCI ROBOT;  Surgeon: Yue Jones MD;  Location: [facility] MAIN OR;  Service: Robotics - DaVinci;  Laterality: N/A;    BARIATRIC SURGERY  3/16/2023    All is good    COLONOSCOPY  11/2023 2018 2023    TOENAIL EXCISION      01/2022      The following portions of the patient's history were reviewed and updated as appropriate: allergies, current medications, past medical history, past social history, past surgical history, and problem list.    Vitals:    07/28/25 1024   BP: 150/96   Pulse: 70   Resp: 18   SpO2: 97%       Physical Exam  Constitutional:       Appearance: Normal appearance. She is obese.   Pulmonary:      Effort: Pulmonary effort is normal.   Abdominal:      General: Abdomen is flat.   Neurological:      General: No focal deficit present.      Mental Status: She is alert and oriented to  person, place, and time.   Psychiatric:         Mood and Affect: Mood normal.         Behavior: Behavior normal.         Thought Content: Thought content normal.         Judgment: Judgment normal.             Assessment:   BMI 35.57, class 2 obesity, medication management: zepbound through [facility] , dysphagia     Post-op, the patient is doing well. She has been on zepbound 2.5 mg weekly for a little over 1 month now. She states she has some constipation but this is improved with a daily laxative. Minimal GERD. No nausea or vomiting. She is still having dysphagia and would like to now do an upper GI. New order for upper gi placed today. Follow up after upper GI to determine if an EGD is needed. Pt did state she is working on chewing her food well., eating slowly and not eating and drinking at the same time. She states the zepbound is helping with hunger, snacking and portion size. She would like to stay on the medication at the same dose 2.5 mg weekly for now. I did inform the pt to call the office and let us know if she would like to increased to the next dose in the future if not having any side effects or symptoms. Plan to follow up in 3 months for med check.     Encourage 60+ grams of protein in her diet a day and 20-30 minutes of exercise 2-3 days a week including strength training.      Plan:     Encouraged patient to be sure to get plenty of lean protein per day through small frequent meals all with a protein source.   Activity restrictions: none.   Recommended patient be sure to get at least 70 grams of protein per day by eating small, frequent meals all with high lean protein choices. Be sure to limit/cut back on daily carbohydrate intake. Discussed with the patient the recommended amount of water per day to intake- half of body weight in ounces. Reviewed vitamin requirements. Be sure to do routine exercise, 150 minutes per week minimum, including both cardio and strength training.     Instructions /  Recommendations: dietary counseling recommended, recommended a daily protein intake of  grams, vitamin supplement(s) recommended, recommended exercising at least 150 minutes per week, behavior modifications recommended and instructed to call the office for concerns, questions, or problems.     The patient was instructed to follow up in 3 months.     The patient was counseled regarding diet and exercise/ zepbound. Total time spent face to face was 20 minutes and 15 minutes was spent counseling.     Oriana Mcmillan APRESTHER  [facility] Bariatrics

## 2025-08-11 ENCOUNTER — HOSPITAL ENCOUNTER (OUTPATIENT)
Dept: GENERAL RADIOLOGY | Facility: HOSPITAL | Age: 65
Discharge: HOME | End: 2025-08-11
Admitting: NURSE PRACTITIONER
Payer: COMMERCIAL

## 2025-08-11 ENCOUNTER — PREP FOR SURGERY (OUTPATIENT)
Dept: OTHER | Facility: HOSPITAL | Age: 65
End: 2025-08-11
Payer: COMMERCIAL

## 2025-08-11 DIAGNOSIS — R13.10 DYSPHAGIA, UNSPECIFIED TYPE: ICD-10-CM

## 2025-08-11 DIAGNOSIS — R11.2 NAUSEA AND VOMITING, UNSPECIFIED VOMITING TYPE: ICD-10-CM

## 2025-08-11 DIAGNOSIS — Z90.3 H/O GASTRIC SLEEVE: ICD-10-CM

## 2025-08-11 DIAGNOSIS — R13.10 DYSPHAGIA, UNSPECIFIED TYPE: Primary | ICD-10-CM

## 2025-08-11 PROCEDURE — 74246 X-RAY XM UPR GI TRC 2CNTRST: CPT

## 2025-08-11 RX ORDER — SODIUM CHLORIDE 0.9 % (FLUSH) 0.9 %
10 SYRINGE (ML) INJECTION AS NEEDED
OUTPATIENT
Start: 2025-08-11

## 2025-08-11 RX ORDER — SODIUM CHLORIDE 9 MG/ML
30 INJECTION, SOLUTION INTRAVENOUS CONTINUOUS PRN
OUTPATIENT
Start: 2025-08-11 | End: 2025-08-11

## 2025-08-11 RX ADMIN — BARIUM SULFATE 135 ML: 980 POWDER, FOR SUSPENSION ORAL at 09:13

## 2025-08-11 RX ADMIN — BARIUM SULFATE 183 ML: 960 POWDER, FOR SUSPENSION ORAL at 09:13

## 2025-08-11 RX ADMIN — BARIUM SULFATE 700 MG: 700 TABLET ORAL at 09:13

## 2025-08-18 DIAGNOSIS — Z90.3 H/O GASTRIC SLEEVE: Primary | ICD-10-CM

## 2025-08-25 ENCOUNTER — ANESTHESIA EVENT (OUTPATIENT)
Dept: GASTROENTEROLOGY | Facility: HOSPITAL | Age: 65
End: 2025-08-25
Payer: COMMERCIAL

## 2025-08-25 ENCOUNTER — ANESTHESIA (OUTPATIENT)
Dept: GASTROENTEROLOGY | Facility: HOSPITAL | Age: 65
End: 2025-08-25
Payer: COMMERCIAL

## 2025-08-25 ENCOUNTER — HOSPITAL ENCOUNTER (OUTPATIENT)
Facility: HOSPITAL | Age: 65
Setting detail: HOSPITAL OUTPATIENT SURGERY
Discharge: HOME | End: 2025-08-25
Attending: SURGERY | Admitting: SURGERY
Payer: COMMERCIAL

## 2025-08-25 VITALS
OXYGEN SATURATION: 99 % | BODY MASS INDEX: 34.86 KG/M2 | HEART RATE: 62 BPM | TEMPERATURE: 97.8 F | SYSTOLIC BLOOD PRESSURE: 140 MMHG | HEIGHT: 68 IN | DIASTOLIC BLOOD PRESSURE: 79 MMHG | RESPIRATION RATE: 14 BRPM | WEIGHT: 230 LBS

## 2025-08-25 PROCEDURE — 25010000002 LIDOCAINE HCL (CARDIAC) PF 100 MG/5ML SOLUTION PREFILLED SYRINGE: Performed by: NURSE ANESTHETIST, CERTIFIED REGISTERED

## 2025-08-25 PROCEDURE — 25010000002 PROPOFOL 500 MG/50ML EMULSION: Performed by: NURSE ANESTHETIST, CERTIFIED REGISTERED

## 2025-08-25 PROCEDURE — 25810000003 SODIUM CHLORIDE 0.9 % SOLUTION: Performed by: NURSE ANESTHETIST, CERTIFIED REGISTERED

## 2025-08-25 PROCEDURE — 25010000002 GLYCOPYRROLATE 0.2 MG/ML SOLUTION: Performed by: NURSE ANESTHETIST, CERTIFIED REGISTERED

## 2025-08-25 PROCEDURE — 25810000003 SODIUM CHLORIDE 0.9 % SOLUTION: Performed by: ANESTHESIOLOGY

## 2025-08-25 RX ORDER — LABETALOL HYDROCHLORIDE 5 MG/ML
5 INJECTION, SOLUTION INTRAVENOUS
Status: DISCONTINUED | OUTPATIENT
Start: 2025-08-25 | End: 2025-08-25 | Stop reason: HOSPADM

## 2025-08-25 RX ORDER — HYDRALAZINE HYDROCHLORIDE 20 MG/ML
5 INJECTION INTRAMUSCULAR; INTRAVENOUS
Status: DISCONTINUED | OUTPATIENT
Start: 2025-08-25 | End: 2025-08-25 | Stop reason: HOSPADM

## 2025-08-25 RX ORDER — PROPOFOL 10 MG/ML
INJECTION, EMULSION INTRAVENOUS AS NEEDED
Status: DISCONTINUED | OUTPATIENT
Start: 2025-08-25 | End: 2025-08-25 | Stop reason: SURG

## 2025-08-25 RX ORDER — EPHEDRINE SULFATE 5 MG/ML
5 INJECTION INTRAVENOUS ONCE AS NEEDED
Status: DISCONTINUED | OUTPATIENT
Start: 2025-08-25 | End: 2025-08-25 | Stop reason: HOSPADM

## 2025-08-25 RX ORDER — LIDOCAINE HYDROCHLORIDE 20 MG/ML
INJECTION, SOLUTION INTRAVENOUS AS NEEDED
Status: DISCONTINUED | OUTPATIENT
Start: 2025-08-25 | End: 2025-08-25 | Stop reason: SURG

## 2025-08-25 RX ORDER — GLYCOPYRROLATE 0.2 MG/ML
INJECTION INTRAMUSCULAR; INTRAVENOUS AS NEEDED
Status: DISCONTINUED | OUTPATIENT
Start: 2025-08-25 | End: 2025-08-25 | Stop reason: SURG

## 2025-08-25 RX ORDER — SODIUM CHLORIDE 9 MG/ML
9 INJECTION, SOLUTION INTRAVENOUS CONTINUOUS
Status: DISCONTINUED | OUTPATIENT
Start: 2025-08-25 | End: 2025-08-25 | Stop reason: HOSPADM

## 2025-08-25 RX ORDER — SODIUM CHLORIDE 9 MG/ML
INJECTION, SOLUTION INTRAVENOUS CONTINUOUS PRN
Status: DISCONTINUED | OUTPATIENT
Start: 2025-08-25 | End: 2025-08-25 | Stop reason: SURG

## 2025-08-25 RX ORDER — ONDANSETRON 2 MG/ML
4 INJECTION INTRAMUSCULAR; INTRAVENOUS ONCE AS NEEDED
Status: DISCONTINUED | OUTPATIENT
Start: 2025-08-25 | End: 2025-08-25 | Stop reason: HOSPADM

## 2025-08-25 RX ORDER — IPRATROPIUM BROMIDE AND ALBUTEROL SULFATE 2.5; .5 MG/3ML; MG/3ML
3 SOLUTION RESPIRATORY (INHALATION) ONCE AS NEEDED
Status: DISCONTINUED | OUTPATIENT
Start: 2025-08-25 | End: 2025-08-25 | Stop reason: HOSPADM

## 2025-08-25 RX ADMIN — PROPOFOL 30 MG: 10 INJECTION, EMULSION INTRAVENOUS at 14:09

## 2025-08-25 RX ADMIN — GLYCOPYRROLATE 0.2 MG: 0.2 SOLUTION INTRAMUSCULAR; INTRAVENOUS at 14:04

## 2025-08-25 RX ADMIN — PROPOFOL 70 MG: 10 INJECTION, EMULSION INTRAVENOUS at 14:06

## 2025-08-25 RX ADMIN — SODIUM CHLORIDE 9 ML/HR: 9 INJECTION, SOLUTION INTRAVENOUS at 13:02

## 2025-08-25 RX ADMIN — PROPOFOL 50 MG: 10 INJECTION, EMULSION INTRAVENOUS at 14:07

## 2025-08-25 RX ADMIN — SODIUM CHLORIDE: 9 INJECTION, SOLUTION INTRAVENOUS at 14:02

## 2025-08-25 RX ADMIN — LIDOCAINE HYDROCHLORIDE 100 MG: 20 INJECTION, SOLUTION INTRAVENOUS at 14:06

## 2025-08-31 DIAGNOSIS — E03.9 ACQUIRED HYPOTHYROIDISM: ICD-10-CM

## 2025-08-31 RX ORDER — LEVOTHYROXINE SODIUM 75 UG/1
75 TABLET ORAL DAILY
Qty: 90 TABLET | Refills: 1 | Status: SHIPPED | OUTPATIENT
Start: 2025-08-31

## (undated) DEVICE — THE STERILE LIGHT HANDLE COVER IS USED WITH STERIS SURGICAL LIGHTING AND VISUALIZATION SYSTEMS.

## (undated) DEVICE — VISIGI 3D®  CALIBRATION SYSTEM  SIZE 40FR STD W/ BULB: Brand: BOEHRINGER® VISIGI 3D™ SLEEVE GASTRECTOMY CALIBRATION SYSTEM, SIZE 40FR W/BULB

## (undated) DEVICE — BITEBLOCK ENDO W/STRAP 60F A/ LF DISP

## (undated) DEVICE — BLADELESS OBTURATOR: Brand: WECK VISTA

## (undated) DEVICE — GAUZE,SPONGE,4"X4",16PLY,XRAY,STRL,LF: Brand: MEDLINE

## (undated) DEVICE — KT SURG TURNOVER 050

## (undated) DEVICE — LAPAROVUE VISIBILITY SYSTEM LAPAROSCOPIC SOLUTIONS: Brand: LAPAROVUE

## (undated) DEVICE — ARM DRAPE

## (undated) DEVICE — SOLUTION,WATER,IRRIGATION,1000ML,STERILE: Brand: MEDLINE

## (undated) DEVICE — NEEDLE, QUINCKE, 20GX3.5": Brand: MEDLINE

## (undated) DEVICE — ENDOPATH XCEL WITH OPTIVIEW TECHNOLOGY BLADELESS TROCARS WITH STABILITY SLEEVES: Brand: ENDOPATH XCEL OPTIVIEW

## (undated) DEVICE — SLV SCD CALF HEMOFORCE DVT THERP REPROC MD

## (undated) DEVICE — ABDOMINAL BINDER: Brand: DEROYAL

## (undated) DEVICE — PK BARIATRIC 50

## (undated) DEVICE — DEV INFL CRE STERIFLATE 60CC DISP

## (undated) DEVICE — SOL IRRIG NACL 1000ML

## (undated) DEVICE — 3M™ IOBAN™ 2 ANTIMICROBIAL INCISE DRAPE 6650EZ: Brand: IOBAN™ 2

## (undated) DEVICE — TIP COVER ACCESSORY

## (undated) DEVICE — VESSEL SEALER EXTEND: Brand: ENDOWRIST

## (undated) DEVICE — SEALANT WND FIBRIN TISSEEL PREFIL/SYR/PRIMAFZ 4ML

## (undated) DEVICE — PBT NON ABSORBABLE WOUND CLOSURE DEVICE
Type: IMPLANTABLE DEVICE | Site: ABDOMEN | Status: NON-FUNCTIONAL
Brand: V-LOC
Removed: 2023-03-16

## (undated) DEVICE — CANNULA SEAL

## (undated) DEVICE — PASS SUT PRO BARIATRIC XL W/TROC SWABS

## (undated) DEVICE — ST TBG AIRSEAL BIF FLTR W/ACT/CHARCOAL/FLTR

## (undated) DEVICE — TROC BLADLES AIRSEAL/OPTI THRD 8X120MM 1P/U

## (undated) DEVICE — PK ENDO GI 50

## (undated) DEVICE — COLUMN DRAPE

## (undated) DEVICE — REDUCER: Brand: ENDOWRIST

## (undated) DEVICE — SINGLE-USE BIOPSY FORCEPS: Brand: RADIAL JAW 4

## (undated) DEVICE — GOWN,REINF,POLY,ECL,PP SLV,XL,XLONG: Brand: MEDLINE

## (undated) DEVICE — GLV SURG SIGNATURE ESSENTIAL PF LTX SZ7.5

## (undated) DEVICE — SYR LUERLOK 50ML

## (undated) DEVICE — GOWN,REINFORCE,POLY,SIRUS,BREATH SLV,XLG: Brand: MEDLINE

## (undated) DEVICE — SUT ETHIB EXCL 0/0 36IN ETX524H

## (undated) DEVICE — APL DUPLOSPRAYER MIS 40CM

## (undated) DEVICE — CVR HNDL LT SURG ACCSSRY BLU STRL

## (undated) DEVICE — MAT PREVALON MOBL TRANSFR AIR W/PAD REPROC 39X81IN

## (undated) DEVICE — STAPLER 60: Brand: SUREFORM

## (undated) DEVICE — SEAL

## (undated) DEVICE — Device

## (undated) DEVICE — THE STERILE CAMERA HANDLE COVER IS FOR USE WITH THE STERIS SURGICAL LIGHTING AND VISUALIZATION SYSTEMS.

## (undated) DEVICE — 9165 UNIVERSAL PATIENT PLATE: Brand: 3M™

## (undated) DEVICE — ESOPHAGEAL BALLOON DILATATION CATHETER: Brand: CRE FIXED WIRE

## (undated) DEVICE — SYRINGE,TOOMEY,IRRIGATION,70CC,STERILE: Brand: MEDLINE